# Patient Record
Sex: FEMALE | Race: OTHER | HISPANIC OR LATINO | URBAN - METROPOLITAN AREA
[De-identification: names, ages, dates, MRNs, and addresses within clinical notes are randomized per-mention and may not be internally consistent; named-entity substitution may affect disease eponyms.]

---

## 2017-04-26 ENCOUNTER — OUTPATIENT (OUTPATIENT)
Dept: OUTPATIENT SERVICES | Facility: HOSPITAL | Age: 72
LOS: 1 days | Discharge: HOME | End: 2017-04-26

## 2017-06-07 ENCOUNTER — APPOINTMENT (OUTPATIENT)
Dept: OTOLARYNGOLOGY | Facility: CLINIC | Age: 72
End: 2017-06-07

## 2017-06-07 PROBLEM — Z00.00 ENCOUNTER FOR PREVENTIVE HEALTH EXAMINATION: Status: ACTIVE | Noted: 2017-06-07

## 2017-06-14 ENCOUNTER — APPOINTMENT (OUTPATIENT)
Dept: OTOLARYNGOLOGY | Facility: CLINIC | Age: 72
End: 2017-06-14

## 2017-06-14 DIAGNOSIS — R13.10 DYSPHAGIA, UNSPECIFIED: ICD-10-CM

## 2017-06-28 DIAGNOSIS — E04.9 NONTOXIC GOITER, UNSPECIFIED: ICD-10-CM

## 2017-07-24 ENCOUNTER — OUTPATIENT (OUTPATIENT)
Dept: PREADMISSION | Facility: HOSPITAL | Age: 72
LOS: 1 days | Discharge: HOME | End: 2017-07-24

## 2017-07-24 DIAGNOSIS — E04.1 NONTOXIC SINGLE THYROID NODULE: ICD-10-CM

## 2017-07-24 DIAGNOSIS — Z01.818 ENCOUNTER FOR OTHER PREPROCEDURAL EXAMINATION: ICD-10-CM

## 2017-07-24 DIAGNOSIS — E66.9 OBESITY, UNSPECIFIED: ICD-10-CM

## 2017-07-24 DIAGNOSIS — I10 ESSENTIAL (PRIMARY) HYPERTENSION: ICD-10-CM

## 2017-08-07 ENCOUNTER — OUTPATIENT (OUTPATIENT)
Dept: OUTPATIENT SERVICES | Facility: HOSPITAL | Age: 72
LOS: 1 days | Discharge: HOME | End: 2017-08-07

## 2017-08-07 ENCOUNTER — APPOINTMENT (OUTPATIENT)
Dept: OTOLARYNGOLOGY | Facility: HOSPITAL | Age: 72
End: 2017-08-07
Payer: MEDICARE

## 2017-08-07 PROCEDURE — 60240 REMOVAL OF THYROID: CPT

## 2017-08-16 ENCOUNTER — APPOINTMENT (OUTPATIENT)
Dept: OTOLARYNGOLOGY | Facility: CLINIC | Age: 72
End: 2017-08-16
Payer: MEDICARE

## 2017-08-16 VITALS — BODY MASS INDEX: 38.57 KG/M2 | HEIGHT: 66 IN | WEIGHT: 240 LBS

## 2017-08-16 PROCEDURE — 99024 POSTOP FOLLOW-UP VISIT: CPT

## 2017-08-17 DIAGNOSIS — E66.9 OBESITY, UNSPECIFIED: ICD-10-CM

## 2017-08-17 DIAGNOSIS — C73 MALIGNANT NEOPLASM OF THYROID GLAND: ICD-10-CM

## 2017-08-17 DIAGNOSIS — I10 ESSENTIAL (PRIMARY) HYPERTENSION: ICD-10-CM

## 2017-08-17 DIAGNOSIS — E78.00 PURE HYPERCHOLESTEROLEMIA, UNSPECIFIED: ICD-10-CM

## 2017-08-17 DIAGNOSIS — E04.1 NONTOXIC SINGLE THYROID NODULE: ICD-10-CM

## 2017-09-20 ENCOUNTER — APPOINTMENT (OUTPATIENT)
Dept: OTOLARYNGOLOGY | Facility: CLINIC | Age: 72
End: 2017-09-20
Payer: MEDICARE

## 2017-09-20 VITALS — WEIGHT: 240 LBS | HEIGHT: 66 IN | BODY MASS INDEX: 38.57 KG/M2

## 2017-09-20 DIAGNOSIS — H91.90 UNSPECIFIED HEARING LOSS, UNSPECIFIED EAR: ICD-10-CM

## 2017-09-20 DIAGNOSIS — H90.3 SENSORINEURAL HEARING LOSS, BILATERAL: ICD-10-CM

## 2017-09-20 PROCEDURE — 92550 TYMPANOMETRY & REFLEX THRESH: CPT

## 2017-09-20 PROCEDURE — 92557 COMPREHENSIVE HEARING TEST: CPT

## 2017-09-20 PROCEDURE — 99213 OFFICE O/P EST LOW 20 MIN: CPT | Mod: 25,24

## 2017-09-26 ENCOUNTER — OUTPATIENT (OUTPATIENT)
Dept: OUTPATIENT SERVICES | Facility: HOSPITAL | Age: 72
LOS: 1 days | Discharge: HOME | End: 2017-09-26

## 2017-09-26 DIAGNOSIS — H90.3 SENSORINEURAL HEARING LOSS, BILATERAL: ICD-10-CM

## 2017-10-03 ENCOUNTER — OUTPATIENT (OUTPATIENT)
Dept: OUTPATIENT SERVICES | Facility: HOSPITAL | Age: 72
LOS: 1 days | Discharge: HOME | End: 2017-10-03

## 2017-10-03 DIAGNOSIS — H90.3 SENSORINEURAL HEARING LOSS, BILATERAL: ICD-10-CM

## 2017-10-17 ENCOUNTER — OUTPATIENT (OUTPATIENT)
Dept: OUTPATIENT SERVICES | Facility: HOSPITAL | Age: 72
LOS: 1 days | Discharge: HOME | End: 2017-10-17

## 2017-10-17 DIAGNOSIS — H90.3 SENSORINEURAL HEARING LOSS, BILATERAL: ICD-10-CM

## 2019-02-19 ENCOUNTER — APPOINTMENT (OUTPATIENT)
Dept: BREAST CENTER | Facility: CLINIC | Age: 74
End: 2019-02-19
Payer: MEDICARE

## 2019-02-19 VITALS
DIASTOLIC BLOOD PRESSURE: 82 MMHG | SYSTOLIC BLOOD PRESSURE: 132 MMHG | TEMPERATURE: 98 F | WEIGHT: 288 LBS | BODY MASS INDEX: 46.28 KG/M2 | HEIGHT: 66 IN

## 2019-02-19 DIAGNOSIS — Z87.891 PERSONAL HISTORY OF NICOTINE DEPENDENCE: ICD-10-CM

## 2019-02-19 PROCEDURE — 99205 OFFICE O/P NEW HI 60 MIN: CPT

## 2019-02-19 RX ORDER — AZITHROMYCIN 250 MG/1
250 TABLET, FILM COATED ORAL
Qty: 6 | Refills: 0 | Status: DISCONTINUED | COMMUNITY
Start: 2017-03-27 | End: 2019-02-19

## 2019-02-19 RX ORDER — LOSARTAN POTASSIUM 50 MG/1
50 TABLET, FILM COATED ORAL
Qty: 30 | Refills: 0 | Status: DISCONTINUED | COMMUNITY
Start: 2017-03-13 | End: 2019-02-19

## 2019-02-19 RX ORDER — LOSARTAN POTASSIUM AND HYDROCHLOROTHIAZIDE 25; 100 MG/1; MG/1
100-25 TABLET ORAL
Refills: 0 | Status: DISCONTINUED | COMMUNITY
End: 2019-02-19

## 2019-02-19 RX ORDER — ALBUTEROL SULFATE 2.5 MG/.5ML
SOLUTION RESPIRATORY (INHALATION)
Refills: 0 | Status: DISCONTINUED | COMMUNITY
End: 2019-02-19

## 2019-02-19 RX ORDER — FUROSEMIDE 40 MG/1
40 TABLET ORAL
Qty: 60 | Refills: 0 | Status: DISCONTINUED | COMMUNITY
Start: 2017-03-13 | End: 2019-02-19

## 2019-02-19 RX ORDER — OXYCODONE AND ACETAMINOPHEN 5; 325 MG/1; MG/1
5-325 TABLET ORAL
Qty: 20 | Refills: 0 | Status: DISCONTINUED | COMMUNITY
Start: 2017-08-07 | End: 2019-02-19

## 2019-02-19 RX ORDER — FLUTICASONE PROPIONATE 44 UG/1
44 AEROSOL, METERED RESPIRATORY (INHALATION)
Qty: 11 | Refills: 0 | Status: DISCONTINUED | COMMUNITY
Start: 2017-04-03 | End: 2019-02-19

## 2019-02-19 RX ORDER — DICLOFENAC SODIUM 75 MG/1
75 TABLET, DELAYED RELEASE ORAL
Refills: 0 | Status: DISCONTINUED | COMMUNITY
End: 2019-02-19

## 2019-02-19 RX ORDER — BUDESONIDE AND FORMOTEROL FUMARATE DIHYDRATE 80; 4.5 UG/1; UG/1
80-4.5 AEROSOL RESPIRATORY (INHALATION)
Qty: 10 | Refills: 0 | Status: DISCONTINUED | COMMUNITY
Start: 2017-07-26 | End: 2019-02-19

## 2019-02-20 ENCOUNTER — FORM ENCOUNTER (OUTPATIENT)
Age: 74
End: 2019-02-20

## 2019-02-21 ENCOUNTER — OUTPATIENT (OUTPATIENT)
Dept: OUTPATIENT SERVICES | Facility: HOSPITAL | Age: 74
LOS: 1 days | Discharge: HOME | End: 2019-02-21

## 2019-02-22 ENCOUNTER — FORM ENCOUNTER (OUTPATIENT)
Age: 74
End: 2019-02-22

## 2019-02-23 ENCOUNTER — OUTPATIENT (OUTPATIENT)
Dept: OUTPATIENT SERVICES | Facility: HOSPITAL | Age: 74
LOS: 1 days | Discharge: HOME | End: 2019-02-23

## 2019-02-23 DIAGNOSIS — C50.412 MALIGNANT NEOPLASM OF UPPER-OUTER QUADRANT OF LEFT FEMALE BREAST: ICD-10-CM

## 2019-02-25 ENCOUNTER — FORM ENCOUNTER (OUTPATIENT)
Age: 74
End: 2019-02-25

## 2019-02-26 ENCOUNTER — OUTPATIENT (OUTPATIENT)
Dept: OUTPATIENT SERVICES | Facility: HOSPITAL | Age: 74
LOS: 1 days | Discharge: HOME | End: 2019-02-26

## 2019-02-26 ENCOUNTER — LABORATORY RESULT (OUTPATIENT)
Age: 74
End: 2019-02-26

## 2019-02-26 DIAGNOSIS — R92.8 OTHER ABNORMAL AND INCONCLUSIVE FINDINGS ON DIAGNOSTIC IMAGING OF BREAST: ICD-10-CM

## 2019-02-26 DIAGNOSIS — R89.7 ABNORMAL HISTOLOGICAL FINDINGS IN SPECIMENS FROM OTHER ORGANS, SYSTEMS AND TISSUES: ICD-10-CM

## 2019-02-26 NOTE — CONSULT LETTER
[Dear  ___] : Dear  [unfilled], [Consult Letter:] : I had the pleasure of evaluating your patient, [unfilled]. [Please see my note below.] : Please see my note below. [Consult Closing:] : Thank you very much for allowing me to participate in the care of this patient.  If you have any questions, please do not hesitate to contact me. [Sincerely,] : Sincerely, [FreeTextEntry2] : Odessa Lo M.D.\par [address]\[place] [address]  [FreeTextEntry3] : Bebeto Stewart M.D., F.A.C.S.

## 2019-02-26 NOTE — ASSESSMENT
[FreeTextEntry1] : Tameka is a 74-year-old female who presents with a new diagnosis of left breast cancer. The patient recently underwent 2 stereotactic biopsies of a 6 cm area of calcifications in the left breast. The anterior biopsy demonstrated moderately differentiated invasive ductal carcinoma which is ER/LA positive and HER-2/atif negative. The posterior biopsy was benign demonstrated fibrocystic changes. She has a benign clinical breast examination. There are no palpable masses, nipple discharge or inversion. I reviewed her x-rays.\par \par We had a lengthy discussion regarding her diagnosis and treatment. We will schedule her for a left breast ultrasound to evaluate the area of concern and the axillary lymph nodes. We will also schedule her for bilateral breast MRI preoperatively. If there were no other malignancies in the left breast, she is a good candidate for breast conserving therapy. We will schedule her for a left breast lumpectomy with RF localizers x2 (for bracketing), left sentinel lymph node mapping and biopsy, possible axillary node dissection. All risks and benefits were explained.\par \par I spent a total of 60 minutes of face to face time with this patient, greater than 50% of which was spent in counseling and/or coordination of care.  All of her questions were appropriately answered.\par

## 2019-02-26 NOTE — HISTORY OF PRESENT ILLNESS
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 \par

## 2019-02-26 NOTE — REASON FOR VISIT
[Consultation] : a consultation visit [Family Member] : family member [FreeTextEntry1] : Left breast cancer; last mammogram was at least 10 years ago; PMD is Dr. Odessa Lo.

## 2019-02-26 NOTE — PAST MEDICAL HISTORY
[Postmenopausal] : The patient is postmenopausal [Menarche Age ____] : age at menarche was [unfilled] [Menopause Age____] : age at menopause was [unfilled] [Total Preg ___] : G[unfilled] [Live Births ___] : P[unfilled]  [Age At Live Birth ___] : Age at live birth: [unfilled] [History of Hormone Replacement Treatment] : has no history of hormone replacement treatment [FreeTextEntry5] : Unilateral oophorectomy due to fibroids 1988. [FreeTextEntry6] : none [FreeTextEntry7] : OCP for 0-5 years; discontinued in 1988. [FreeTextEntry8] : none

## 2019-02-26 NOTE — DATA REVIEWED
[FreeTextEntry1] : #[id number] MAMMO TOMOSYNTHESIS SCREENING BILATERAL: 12/13/2018\par CLINICAL INDICATION: Patient does report CBE in the past year.\par No prior exams were available for comparison.\par Tomosynthesis and 2D imaging of the breast(s) were performed.\par There are scattered fibroglandular elements in both breasts.\par There are segmental calcifications in the left breast at 12 o'clock middle depth.\par No other significant masses, calcifications, or other findings are seen in either breast.\par Current study was also evaluated with a computer aided detection (CAD) system.\par IMPRESSION: INCOMPLETE: NEEDS ADDITIONAL IMAGING EVALUATION\par The segmental calcifications in the left breast are indeterminate. Magnification and lateral views are recommended.\par The patient has been or will be contacted.\par Electronic Signature: I personally reviewed the images and agree with this report. Final Report: Signed by Attending Rubi Vides MD\par se/penrad:12/18/2018 16:47:28\par letter sent: Additional Imaging Needed\par Mammogram BI-RADS: 0 Incomplete: Needs Additional Imaging Evaluation\par \par \par \par #[id number] MAMMO TOMOSYNTHESIS CALLBACK FROM SCREENING LEFT: 1/9/2019\par CLINICAL INDICATION: Patient does report CBE in the past year. Patient is recalled from screening examination for further evaluation of calcifications in the right breast.\par No prior exams were available for comparison.\par Tomosynthesis and 2D imaging of the breast(s) were performed.\par There are scattered fibroglandular elements in the left breast.\par There is a 6 cm area of segmental amorphous calcifications in the left breast at 12 o'clock middle depth. These are seen in additional views.\par No other significant masses or calcifications are seen in the breast.\par Current study was also evaluated with a computer aided detection (CAD) system.\par IMPRESSION: SUSPICIOUS OF MALIGNANCY\par Findings and recommendations were discussed with the patient at the time of the exam.\par The 6 cm area of segmental amorphous calcifications in the left breast are suspicious of malignancy. A stereotactic biopsy of the most anterior and of the most posterior calcifications withing this group is recommended.\par The patient will be sent a letter.\par Electronic Signature: I personally reviewed the images and agree with this report. Final Report: Signed by Poonam Fernandes MD\par rb/:1/9/2019 12:41:06\par letter sent: Biopsy Required\par Mammogram BI-RADS: 4 Suspicious abnormality\par \par \par \par AMENDMENT: 2/1/2019 Rubi Vides MD\par DIAGNOSIS:\par 1. BREAST, LEFT 12 O'CLOCK "ANTERIOR, W/ CALCS", CORE BIOPSY:\par - INVASIVE DUCTAL CARCINOMA, MODERATELY DIFFERENTIATED\par - SCLEROSING INTRADUCTAL PAPILLOMA\par - COLUMNAR CELL CHANGE AND COLUMNAR CELL HYPERPLASIA, ASSOCIATED\par WITH MICROCALCIFICATIONS\par 2. BREAST, LEFT 12 O'CLOCK "ANTERIOR, TISSUE", CORE BIOPSY:\par - INVASIVE DUCTAL CARCINOMA, MODERATELY DIFFERENTIATED\par 3. BREAST, LEFT 12 O'CLOCK "POSTERIOR, W/ CALCS", CORE BIOPSY:\par - FIBROCYSTIC CHANGE, NON-PROLIFERATIVE TYPE,\par - FIBROADENOMATOID CHANGE\par 4. BREAST, LEFT 12 O'CLOCK "POSTERIOR, TISSUE", CORE BIOPSY:\par - FIBROADENOMATOID CHANGE, ASSOCIATED WITH MICROCALCIFICATIONS\par This is concordant with imaging findings. Surgical consultation and excision with bracketing of the 2 clips is recommended.\par Findings and recommendations were discussed with the patient by the dictating physician on 2/1/2019 at 3:20 PM\par *******END OF ADDENDUM******\par #[id number] MAMMO STEREO CORE BREAST BIOPSY LEFT: 1/22/2019\par \par

## 2019-02-28 DIAGNOSIS — Z02.9 ENCOUNTER FOR ADMINISTRATIVE EXAMINATIONS, UNSPECIFIED: ICD-10-CM

## 2019-02-28 DIAGNOSIS — R89.7 ABNORMAL HISTOLOGICAL FINDINGS IN SPECIMENS FROM OTHER ORGANS, SYSTEMS AND TISSUES: ICD-10-CM

## 2019-03-06 ENCOUNTER — FORM ENCOUNTER (OUTPATIENT)
Age: 74
End: 2019-03-06

## 2019-03-07 ENCOUNTER — RESULT REVIEW (OUTPATIENT)
Age: 74
End: 2019-03-07

## 2019-03-07 ENCOUNTER — OUTPATIENT (OUTPATIENT)
Dept: OUTPATIENT SERVICES | Facility: HOSPITAL | Age: 74
LOS: 1 days | Discharge: HOME | End: 2019-03-07

## 2019-03-07 DIAGNOSIS — R92.8 OTHER ABNORMAL AND INCONCLUSIVE FINDINGS ON DIAGNOSTIC IMAGING OF BREAST: ICD-10-CM

## 2019-03-08 LAB — SURGICAL PATHOLOGY STUDY: SIGNIFICANT CHANGE UP

## 2019-03-14 ENCOUNTER — APPOINTMENT (OUTPATIENT)
Dept: BREAST CENTER | Facility: CLINIC | Age: 74
End: 2019-03-14
Payer: MEDICARE

## 2019-03-14 PROCEDURE — 99214 OFFICE O/P EST MOD 30 MIN: CPT

## 2019-03-14 NOTE — DATA REVIEWED
[FreeTextEntry1] : EXAM:  MR BREAST WAW IC BI         PROCEDURE DATE:  02/23/2019       INTERPRETATION:  CLINICAL HISTORY: New diagnosis of invasive duct  carcinoma in the left breast at an outside facility.  TECHNIQUE: Breast MRI is performed at 1.5 T with the patient prone and  the breasts in a dedicated breast coil. Following a 3 plane localizer,  sagittal T1 weighted, fat-saturated T1 weighted and fat saturated  T2-weighted sequence; dynamic contrast enhanced sagittal images; and  delayed post-contrast axial fat-saturated T1 weighted images were  obtained. 12.5 mL of Gadavist intravenous contrast were injected and 2.5  mL were discarded. Subtraction and MIP images were reviewed.  Delphi  software was used in interpretation.   COMPARISON: Outside mammogram of 12/13/2018, 1/9/2019, and 1/22/2019.  FINDINGS:   Amount of fibroglandular tissue: Scattered fibroglandular tissue.  Background parenchymal enhancement: Moderate., Symmetric.  RIGHT BREAST: Slightly irregular, linear nonmass enhancement in the central aspect of  the right breast measures approximately 0.7 cm. This is seen on series  700, image 174 and series 8, image 46. This demonstrates persistent  enhancement kinetics. MRI guided biopsy is recommended. No additional  suspicious abnormal enhancement is seen in the right breast.  No right axillary adenopathy.  LEFT BREAST: 2 biopsy clips are seen in the superior aspect of the left breast. Mild  patchy nonmass enhancement is seen surrounding the biopsy clips; linear  nonmass enhancement is seen along the anterior and lateral aspect of the  superior biopsy clip measuring approximately 1.8 cm (for example, series  700, image 126). These correspond to the calcifications seen on the  mammogram and to the biopsy-proven carcinoma.  Benign cyst is seen in the lateral aspect of the left breast  corresponding to the circumscribed mass on mammogram.  No additional suspicious abnormal enhancement is seen in the left breast.  Mildly prominent left axillary lymph nodes (series 8, image 95 and series  8, image 85), nonspecific.   IMPRESSION:  1. Nonmass enhancement in the right breast as above. MRI guided biopsy is  recommended. 2. Mildly prominent left axillary lymph nodes, nonspecific. Second Look  ultrasound with possible biopsy is recommended. 3. Biopsy-proven carcinoma in the left breast as above. If breast  conservation is desired, bracketing of the anterior and posterior most  calcifications is recommended.  Recommendation: MRI guided biopsy.  BI-RADS Category 4: Suspicious   Findings and recommendations were discussed with Myra, who will inform  the patient and Dr. Stewart.        HAROON WALL M.D., ATTENDING RADIOLOGIST This document has been electronically signed. Feb 25 2019  4:48PM       \par \par \par \par \par \par EXAM:  US BREAST LIMITED BI         PROCEDURE DATE:  02/26/2019      INTERPRETATION:  CLINICAL HISTORY: Known left breast cancer. Second look  ultrasound for possible left axillary adenopathy.   The patient reports her last clinical breast examination was performed  within the past month.  COMPARISON: MRI done on 2/23/2019.  FAMILY HISTORY: Unknown.   TECHNIQUE: Targeted bilateral ultrasound was performed.   FINDINGS:  ULTRASOUND: Targeted bilateral breast ultrasound was performed.  There is no axillary adenopathy. Specifically, the left axillary lymph  nodes are morphologically benign in appearance.   IMPRESSION:   No axillary adenopathy.   Recommendation: Management as clinically appropriate.  BI-RADS Category 6: Known Biopsy-Proven Malignancy   Findings and recommendations were discussed with the patient.        HAROON WALL M.D., ATTENDING RADIOLOGIST This document has been electronically signed. Feb 26 2019  9:12AM       \par \par \par \par \par EXAM:  MR BX BRST 1ST RT SISC      \par *** ADDENDUM 03/11/2019  ***\par Histology: - benign breast tissue with proliferative type fibrocystic \par changes including dominant nodular foci of cystic/papillary\par apocrine metaplasia, usual type duct hyperplasia, stromal fibrosis, \par sclerosing and blunt duct adenosis, columnar cell change/hyperplasia \par without atypia, and microcalcifications.\par Findings are benign concordant.\par Recommendation: Continued surgical and oncologic follow-up for the \par biopsy-proven left breast malignancy is recommended.\par The findings and recommendation were discussed with the patient on \par 3/11/2019 at 9:30 AM.\par *** END OF ADDENDUM 03/11/2019  ***\par PROCEDURE DATE:  03/07/2019

## 2019-03-14 NOTE — HISTORY OF PRESENT ILLNESS
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 \par \par Right benign breast tissue with proliferative type fibrocystic changes on MR guided core biopsy 3/7/19; Central, 7 mm (cork).  \par

## 2019-03-14 NOTE — REASON FOR VISIT
[Consultation] : a consultation visit [Family Member] : family member [FreeTextEntry1] : Re-consultation for Left breast cancer.

## 2019-03-14 NOTE — PAST MEDICAL HISTORY
[History of Hormone Replacement Treatment] : has no history of hormone replacement treatment [FreeTextEntry5] : Unilateral oophorectomy due to fibroids 1988. [FreeTextEntry6] : none [FreeTextEntry7] : OCP for 0-5 years; discontinued in 1988. [FreeTextEntry8] : none

## 2019-03-14 NOTE — ASSESSMENT
[FreeTextEntry1] : Tameka has a 6 cm area of calcifications which demonstrated invasive ductal carcinoma and ductal carcinoma in situ which is ER/NM positive and HER-2 negative. She had a preoperative bilateral breast MRI which did not demonstrate additional malignancies.  She prefers breast conserving therapy.  We will schedule her for a left breast lumpectomy with needle localization x2 (for bracketing), left sentinel lymph node biopsy, possible axillary node dissection.  All risks, including the possibility of re-excision, and benefits were explained.\par \par I spent a total of 30 minutes of face to face time with this patient, greater than 50% of which was spent in counseling and/or coordination of care.  All of her questions were appropriately answered.\par \par

## 2019-03-19 ENCOUNTER — FORM ENCOUNTER (OUTPATIENT)
Age: 74
End: 2019-03-19

## 2019-03-20 ENCOUNTER — OUTPATIENT (OUTPATIENT)
Dept: OUTPATIENT SERVICES | Facility: HOSPITAL | Age: 74
LOS: 1 days | Discharge: HOME | End: 2019-03-20

## 2019-03-20 VITALS
WEIGHT: 292.77 LBS | OXYGEN SATURATION: 97 % | DIASTOLIC BLOOD PRESSURE: 89 MMHG | SYSTOLIC BLOOD PRESSURE: 157 MMHG | HEIGHT: 66 IN | RESPIRATION RATE: 18 BRPM | TEMPERATURE: 96 F | HEART RATE: 64 BPM

## 2019-03-20 DIAGNOSIS — Z01.818 ENCOUNTER FOR OTHER PREPROCEDURAL EXAMINATION: ICD-10-CM

## 2019-03-20 DIAGNOSIS — C50.512 MALIGNANT NEOPLASM OF LOWER-OUTER QUADRANT OF LEFT FEMALE BREAST: ICD-10-CM

## 2019-03-20 DIAGNOSIS — C50.412 MALIGNANT NEOPLASM OF UPPER-OUTER QUADRANT OF LEFT FEMALE BREAST: ICD-10-CM

## 2019-03-20 DIAGNOSIS — Z98.890 OTHER SPECIFIED POSTPROCEDURAL STATES: Chronic | ICD-10-CM

## 2019-03-20 LAB
ALBUMIN SERPL ELPH-MCNC: 4.3 G/DL — SIGNIFICANT CHANGE UP (ref 3.5–5.2)
ALP SERPL-CCNC: 93 U/L — SIGNIFICANT CHANGE UP (ref 30–115)
ALT FLD-CCNC: 16 U/L — SIGNIFICANT CHANGE UP (ref 0–41)
ANION GAP SERPL CALC-SCNC: 10 MMOL/L — SIGNIFICANT CHANGE UP (ref 7–14)
APTT BLD: 36.3 SEC — SIGNIFICANT CHANGE UP (ref 27–39.2)
AST SERPL-CCNC: 18 U/L — SIGNIFICANT CHANGE UP (ref 0–41)
BASOPHILS # BLD AUTO: 0.07 K/UL — SIGNIFICANT CHANGE UP (ref 0–0.2)
BASOPHILS NFR BLD AUTO: 1.1 % — HIGH (ref 0–1)
BILIRUB SERPL-MCNC: 0.6 MG/DL — SIGNIFICANT CHANGE UP (ref 0.2–1.2)
BUN SERPL-MCNC: 11 MG/DL — SIGNIFICANT CHANGE UP (ref 10–20)
CALCIUM SERPL-MCNC: 9.2 MG/DL — SIGNIFICANT CHANGE UP (ref 8.5–10.1)
CHLORIDE SERPL-SCNC: 103 MMOL/L — SIGNIFICANT CHANGE UP (ref 98–110)
CO2 SERPL-SCNC: 29 MMOL/L — SIGNIFICANT CHANGE UP (ref 17–32)
CREAT SERPL-MCNC: 0.7 MG/DL — SIGNIFICANT CHANGE UP (ref 0.7–1.5)
EOSINOPHIL # BLD AUTO: 0.06 K/UL — SIGNIFICANT CHANGE UP (ref 0–0.7)
EOSINOPHIL NFR BLD AUTO: 0.9 % — SIGNIFICANT CHANGE UP (ref 0–8)
GLUCOSE SERPL-MCNC: 83 MG/DL — SIGNIFICANT CHANGE UP (ref 70–99)
HCT VFR BLD CALC: 39.7 % — SIGNIFICANT CHANGE UP (ref 37–47)
HGB BLD-MCNC: 12.7 G/DL — SIGNIFICANT CHANGE UP (ref 12–16)
IMM GRANULOCYTES NFR BLD AUTO: 0.2 % — SIGNIFICANT CHANGE UP (ref 0.1–0.3)
INR BLD: 0.97 RATIO — SIGNIFICANT CHANGE UP (ref 0.65–1.3)
LYMPHOCYTES # BLD AUTO: 1.13 K/UL — LOW (ref 1.2–3.4)
LYMPHOCYTES # BLD AUTO: 17.9 % — LOW (ref 20.5–51.1)
MCHC RBC-ENTMCNC: 30 PG — SIGNIFICANT CHANGE UP (ref 27–31)
MCHC RBC-ENTMCNC: 32 G/DL — SIGNIFICANT CHANGE UP (ref 32–37)
MCV RBC AUTO: 93.6 FL — SIGNIFICANT CHANGE UP (ref 81–99)
MONOCYTES # BLD AUTO: 0.44 K/UL — SIGNIFICANT CHANGE UP (ref 0.1–0.6)
MONOCYTES NFR BLD AUTO: 7 % — SIGNIFICANT CHANGE UP (ref 1.7–9.3)
NEUTROPHILS # BLD AUTO: 4.61 K/UL — SIGNIFICANT CHANGE UP (ref 1.4–6.5)
NEUTROPHILS NFR BLD AUTO: 72.9 % — SIGNIFICANT CHANGE UP (ref 42.2–75.2)
NRBC # BLD: 0 /100 WBCS — SIGNIFICANT CHANGE UP (ref 0–0)
PLATELET # BLD AUTO: 161 K/UL — SIGNIFICANT CHANGE UP (ref 130–400)
POTASSIUM SERPL-MCNC: 4.3 MMOL/L — SIGNIFICANT CHANGE UP (ref 3.5–5)
POTASSIUM SERPL-SCNC: 4.3 MMOL/L — SIGNIFICANT CHANGE UP (ref 3.5–5)
PROT SERPL-MCNC: 6.9 G/DL — SIGNIFICANT CHANGE UP (ref 6–8)
PROTHROM AB SERPL-ACNC: 11.2 SEC — SIGNIFICANT CHANGE UP (ref 9.95–12.87)
RBC # BLD: 4.24 M/UL — SIGNIFICANT CHANGE UP (ref 4.2–5.4)
RBC # FLD: 12 % — SIGNIFICANT CHANGE UP (ref 11.5–14.5)
SODIUM SERPL-SCNC: 142 MMOL/L — SIGNIFICANT CHANGE UP (ref 135–146)
WBC # BLD: 6.32 K/UL — SIGNIFICANT CHANGE UP (ref 4.8–10.8)
WBC # FLD AUTO: 6.32 K/UL — SIGNIFICANT CHANGE UP (ref 4.8–10.8)

## 2019-03-20 NOTE — H&P PST ADULT - HISTORY OF PRESENT ILLNESS
CURRENTLY  DENIES ANY CP, SOB,PALPITATIONS,COUGH OR DYSURIA  EXERCISE TOLERANCE 1-2 FOS WITHOUT SOB    AS PER PATIENT  this is his/her complete medical history including medications - PRESCRIPTIONS  OVER THE COUNTER MEDS CURRENTLY  DENIES ANY CP, SOB,PALPITATIONS,COUGH OR DYSURIA  EXERCISE TOLERANCE 1 FOS WITHOUT SOB    AS PER PATIENT  this is his/her complete medical history including medications - PRESCRIPTIONS  OVER THE COUNTER MEDS

## 2019-03-20 NOTE — H&P PST ADULT - NSICDXPASTMEDICALHX_GEN_ALL_CORE_FT
PAST MEDICAL HISTORY:  Asthma NEVER HAD ATTACK- ALLERGY INDUCED    Breast cancer LEFT    HTN (hypertension)     OA (osteoarthritis)     PVD (peripheral vascular disease)

## 2019-03-20 NOTE — H&P PST ADULT - PRIMARY CARE PROVIDER
michellear- lv 3/2019   farid- lv 3/2019 appt 3/25/19  pulmonary- ahmed lv 3/19   endorine- miladysamia lv 3/2019 shektar- lv 3/2019   farid- lv 3/2019 appt 3/25/19  pulmonary- ahmed lv 3/19   endocrine- calamia lv 3/2019

## 2019-03-20 NOTE — H&P PST ADULT - REASON FOR ADMISSION
73 Y/O F SCHEDULED FOR PAST FOR LEFT BREAST BRACKETED LUMPECTOMY WITH NEEDLE LUMPECTOMY , SENTINEL NODE MAPPING AND BIOPSY, POSSIBLE AXILLARY NODE DISSECTION 4/3/19

## 2019-03-27 ENCOUNTER — OUTPATIENT (OUTPATIENT)
Dept: OUTPATIENT SERVICES | Facility: HOSPITAL | Age: 74
LOS: 1 days | Discharge: HOME | End: 2019-03-27

## 2019-03-27 ENCOUNTER — LABORATORY RESULT (OUTPATIENT)
Age: 74
End: 2019-03-27

## 2019-03-27 DIAGNOSIS — R89.7 ABNORMAL HISTOLOGICAL FINDINGS IN SPECIMENS FROM OTHER ORGANS, SYSTEMS AND TISSUES: ICD-10-CM

## 2019-03-27 DIAGNOSIS — Z98.890 OTHER SPECIFIED POSTPROCEDURAL STATES: Chronic | ICD-10-CM

## 2019-03-27 PROBLEM — M19.90 UNSPECIFIED OSTEOARTHRITIS, UNSPECIFIED SITE: Chronic | Status: ACTIVE | Noted: 2019-03-20

## 2019-03-27 PROBLEM — C50.919 MALIGNANT NEOPLASM OF UNSPECIFIED SITE OF UNSPECIFIED FEMALE BREAST: Chronic | Status: ACTIVE | Noted: 2019-03-20

## 2019-03-27 PROBLEM — I10 ESSENTIAL (PRIMARY) HYPERTENSION: Chronic | Status: ACTIVE | Noted: 2019-03-20

## 2019-03-27 PROBLEM — I73.9 PERIPHERAL VASCULAR DISEASE, UNSPECIFIED: Chronic | Status: ACTIVE | Noted: 2019-03-20

## 2019-04-01 ENCOUNTER — FORM ENCOUNTER (OUTPATIENT)
Age: 74
End: 2019-04-01

## 2019-04-02 ENCOUNTER — OUTPATIENT (OUTPATIENT)
Dept: OUTPATIENT SERVICES | Facility: HOSPITAL | Age: 74
LOS: 1 days | Discharge: HOME | End: 2019-04-02
Payer: MEDICARE

## 2019-04-02 ENCOUNTER — FORM ENCOUNTER (OUTPATIENT)
Age: 74
End: 2019-04-02

## 2019-04-02 DIAGNOSIS — Z98.890 OTHER SPECIFIED POSTPROCEDURAL STATES: Chronic | ICD-10-CM

## 2019-04-02 DIAGNOSIS — C81.09 NODULAR LYMPHOCYTE PREDOMINANT HODGKIN LYMPHOMA, EXTRANODAL AND SOLID ORGAN SITES: ICD-10-CM

## 2019-04-02 PROCEDURE — 78195 LYMPH SYSTEM IMAGING: CPT | Mod: 26

## 2019-04-02 NOTE — ASU PATIENT PROFILE, ADULT - PMH
Asthma  NEVER HAD ATTACK- ALLERGY INDUCED  Breast cancer  LEFT  HTN (hypertension)    OA (osteoarthritis)    PVD (peripheral vascular disease)

## 2019-04-03 ENCOUNTER — RESULT REVIEW (OUTPATIENT)
Age: 74
End: 2019-04-03

## 2019-04-03 ENCOUNTER — APPOINTMENT (OUTPATIENT)
Dept: BREAST CENTER | Facility: AMBULATORY SURGERY CENTER | Age: 74
End: 2019-04-03
Payer: MEDICARE

## 2019-04-03 ENCOUNTER — INPATIENT (INPATIENT)
Facility: HOSPITAL | Age: 74
LOS: 1 days | Discharge: HOME | End: 2019-04-05
Attending: SPECIALIST | Admitting: SPECIALIST
Payer: MEDICARE

## 2019-04-03 VITALS
HEART RATE: 71 BPM | HEIGHT: 66 IN | TEMPERATURE: 98 F | OXYGEN SATURATION: 97 % | RESPIRATION RATE: 20 BRPM | SYSTOLIC BLOOD PRESSURE: 153 MMHG | DIASTOLIC BLOOD PRESSURE: 87 MMHG | WEIGHT: 292.77 LBS

## 2019-04-03 DIAGNOSIS — Z98.890 OTHER SPECIFIED POSTPROCEDURAL STATES: Chronic | ICD-10-CM

## 2019-04-03 LAB
ANION GAP SERPL CALC-SCNC: 18 MMOL/L — HIGH (ref 7–14)
BASE EXCESS BLDA CALC-SCNC: 2.6 MMOL/L — HIGH (ref -2–2)
BUN SERPL-MCNC: 14 MG/DL — SIGNIFICANT CHANGE UP (ref 10–20)
CALCIUM SERPL-MCNC: 8.7 MG/DL — SIGNIFICANT CHANGE UP (ref 8.5–10.1)
CHLORIDE SERPL-SCNC: 102 MMOL/L — SIGNIFICANT CHANGE UP (ref 98–110)
CK MB CFR SERPL CALC: 3.5 NG/ML — SIGNIFICANT CHANGE UP (ref 0.6–6.3)
CK SERPL-CCNC: 196 U/L — SIGNIFICANT CHANGE UP (ref 0–225)
CO2 SERPL-SCNC: 21 MMOL/L — SIGNIFICANT CHANGE UP (ref 17–32)
CREAT SERPL-MCNC: 0.7 MG/DL — SIGNIFICANT CHANGE UP (ref 0.7–1.5)
GLUCOSE BLDC GLUCOMTR-MCNC: 126 MG/DL — HIGH (ref 70–99)
GLUCOSE SERPL-MCNC: 161 MG/DL — HIGH (ref 70–99)
HCO3 BLDA-SCNC: 29 MMOL/L — HIGH (ref 23–27)
HCT VFR BLD CALC: 42.2 % — SIGNIFICANT CHANGE UP (ref 37–47)
HGB BLD-MCNC: 13.5 G/DL — SIGNIFICANT CHANGE UP (ref 12–16)
HOROWITZ INDEX BLDA+IHG-RTO: 32 — SIGNIFICANT CHANGE UP
MAGNESIUM SERPL-MCNC: 1.7 MG/DL — LOW (ref 1.8–2.4)
MCHC RBC-ENTMCNC: 29.9 PG — SIGNIFICANT CHANGE UP (ref 27–31)
MCHC RBC-ENTMCNC: 32 G/DL — SIGNIFICANT CHANGE UP (ref 32–37)
MCV RBC AUTO: 93.6 FL — SIGNIFICANT CHANGE UP (ref 81–99)
NRBC # BLD: 0 /100 WBCS — SIGNIFICANT CHANGE UP (ref 0–0)
PCO2 BLDA: 49 MMHG — HIGH (ref 38–42)
PH BLDA: 7.38 — SIGNIFICANT CHANGE UP (ref 7.38–7.42)
PHOSPHATE SERPL-MCNC: 3.2 MG/DL — SIGNIFICANT CHANGE UP (ref 2.1–4.9)
PLATELET # BLD AUTO: 196 K/UL — SIGNIFICANT CHANGE UP (ref 130–400)
PO2 BLDA: 88 MMHG — SIGNIFICANT CHANGE UP (ref 78–95)
POTASSIUM SERPL-MCNC: 4.4 MMOL/L — SIGNIFICANT CHANGE UP (ref 3.5–5)
POTASSIUM SERPL-SCNC: 4.4 MMOL/L — SIGNIFICANT CHANGE UP (ref 3.5–5)
RBC # BLD: 4.51 M/UL — SIGNIFICANT CHANGE UP (ref 4.2–5.4)
RBC # FLD: 12.2 % — SIGNIFICANT CHANGE UP (ref 11.5–14.5)
SAO2 % BLDA: 97 % — SIGNIFICANT CHANGE UP (ref 94–98)
SODIUM SERPL-SCNC: 141 MMOL/L — SIGNIFICANT CHANGE UP (ref 135–146)
TROPONIN T SERPL-MCNC: <0.01 NG/ML — SIGNIFICANT CHANGE UP
WBC # BLD: 12.87 K/UL — HIGH (ref 4.8–10.8)
WBC # FLD AUTO: 12.87 K/UL — HIGH (ref 4.8–10.8)

## 2019-04-03 PROCEDURE — 38525 BIOPSY/REMOVAL LYMPH NODES: CPT | Mod: RT

## 2019-04-03 PROCEDURE — 19281 PERQ DEVICE BREAST 1ST IMAG: CPT | Mod: LT

## 2019-04-03 PROCEDURE — 88307 TISSUE EXAM BY PATHOLOGIST: CPT | Mod: 26

## 2019-04-03 PROCEDURE — 19301 PARTIAL MASTECTOMY: CPT | Mod: RT

## 2019-04-03 PROCEDURE — 19282 PERQ DEVICE BREAST EA IMAG: CPT | Mod: LT

## 2019-04-03 PROCEDURE — 88305 TISSUE EXAM BY PATHOLOGIST: CPT | Mod: 26

## 2019-04-03 PROCEDURE — 38900 IO MAP OF SENT LYMPH NODE: CPT | Mod: RT

## 2019-04-03 PROCEDURE — 88341 IMHCHEM/IMCYTCHM EA ADD ANTB: CPT | Mod: 26

## 2019-04-03 PROCEDURE — 88342 IMHCHEM/IMCYTCHM 1ST ANTB: CPT | Mod: 26

## 2019-04-03 RX ORDER — LEVOTHYROXINE SODIUM 125 MCG
150 TABLET ORAL DAILY
Qty: 0 | Refills: 0 | Status: DISCONTINUED | OUTPATIENT
Start: 2019-04-04 | End: 2019-04-04

## 2019-04-03 RX ORDER — ATORVASTATIN CALCIUM 80 MG/1
40 TABLET, FILM COATED ORAL AT BEDTIME
Qty: 0 | Refills: 0 | Status: DISCONTINUED | OUTPATIENT
Start: 2019-04-04 | End: 2019-04-05

## 2019-04-03 RX ORDER — ASPIRIN/CALCIUM CARB/MAGNESIUM 324 MG
81 TABLET ORAL DAILY
Qty: 0 | Refills: 0 | Status: DISCONTINUED | OUTPATIENT
Start: 2019-04-04 | End: 2019-04-05

## 2019-04-03 RX ORDER — OXYCODONE AND ACETAMINOPHEN 5; 325 MG/1; MG/1
1 TABLET ORAL ONCE
Qty: 0 | Refills: 0 | Status: DISCONTINUED | OUTPATIENT
Start: 2019-04-03 | End: 2019-04-03

## 2019-04-03 RX ORDER — BUDESONIDE AND FORMOTEROL FUMARATE DIHYDRATE 160; 4.5 UG/1; UG/1
2 AEROSOL RESPIRATORY (INHALATION)
Qty: 0 | Refills: 0 | Status: DISCONTINUED | OUTPATIENT
Start: 2019-04-03 | End: 2019-04-05

## 2019-04-03 RX ORDER — DEXAMETHASONE 0.5 MG/5ML
8 ELIXIR ORAL EVERY 6 HOURS
Qty: 0 | Refills: 0 | Status: DISCONTINUED | OUTPATIENT
Start: 2019-04-03 | End: 2019-04-04

## 2019-04-03 RX ORDER — HYDROCORTISONE 20 MG
100 TABLET ORAL ONCE
Qty: 0 | Refills: 0 | Status: COMPLETED | OUTPATIENT
Start: 2019-04-03 | End: 2019-04-03

## 2019-04-03 RX ORDER — EPINEPHRINE 0.3 MG/.3ML
0.3 INJECTION INTRAMUSCULAR; SUBCUTANEOUS ONCE
Qty: 0 | Refills: 0 | Status: COMPLETED | OUTPATIENT
Start: 2019-04-03 | End: 2019-04-03

## 2019-04-03 RX ORDER — ALBUTEROL 90 UG/1
2.5 AEROSOL, METERED ORAL ONCE
Qty: 0 | Refills: 0 | Status: COMPLETED | OUTPATIENT
Start: 2019-04-03 | End: 2019-04-03

## 2019-04-03 RX ORDER — FAMOTIDINE 10 MG/ML
10 INJECTION INTRAVENOUS ONCE
Qty: 0 | Refills: 0 | Status: COMPLETED | OUTPATIENT
Start: 2019-04-03 | End: 2019-04-03

## 2019-04-03 RX ORDER — LOSARTAN POTASSIUM 100 MG/1
50 TABLET, FILM COATED ORAL
Qty: 0 | Refills: 0 | Status: DISCONTINUED | OUTPATIENT
Start: 2019-04-04 | End: 2019-04-05

## 2019-04-03 RX ORDER — DEXAMETHASONE 0.5 MG/5ML
8 ELIXIR ORAL EVERY 6 HOURS
Qty: 0 | Refills: 0 | Status: DISCONTINUED | OUTPATIENT
Start: 2019-04-03 | End: 2019-04-03

## 2019-04-03 RX ORDER — SODIUM CHLORIDE 9 MG/ML
1000 INJECTION, SOLUTION INTRAVENOUS
Qty: 0 | Refills: 0 | Status: DISCONTINUED | OUTPATIENT
Start: 2019-04-03 | End: 2019-04-03

## 2019-04-03 RX ORDER — DIPHENHYDRAMINE HCL 50 MG
50 CAPSULE ORAL ONCE
Qty: 0 | Refills: 0 | Status: COMPLETED | OUTPATIENT
Start: 2019-04-03 | End: 2019-04-03

## 2019-04-03 RX ORDER — FAMOTIDINE 10 MG/ML
20 INJECTION INTRAVENOUS ONCE
Qty: 0 | Refills: 0 | Status: COMPLETED | OUTPATIENT
Start: 2019-04-03 | End: 2019-04-03

## 2019-04-03 RX ORDER — EPINEPHRINE 11.25MG/ML
0.5 SOLUTION, NON-ORAL INHALATION
Qty: 0 | Refills: 0 | Status: DISCONTINUED | OUTPATIENT
Start: 2019-04-03 | End: 2019-04-05

## 2019-04-03 RX ORDER — ZOLPIDEM TARTRATE 10 MG/1
5 TABLET ORAL
Qty: 0 | Refills: 0 | Status: DISCONTINUED | OUTPATIENT
Start: 2019-04-04 | End: 2019-04-05

## 2019-04-03 RX ORDER — FAMOTIDINE 10 MG/ML
20 INJECTION INTRAVENOUS DAILY
Qty: 0 | Refills: 0 | Status: DISCONTINUED | OUTPATIENT
Start: 2019-04-03 | End: 2019-04-03

## 2019-04-03 RX ORDER — ONDANSETRON 8 MG/1
4 TABLET, FILM COATED ORAL ONCE
Qty: 0 | Refills: 0 | Status: DISCONTINUED | OUTPATIENT
Start: 2019-04-03 | End: 2019-04-05

## 2019-04-03 RX ORDER — DEXAMETHASONE 0.5 MG/5ML
10 ELIXIR ORAL ONCE
Qty: 0 | Refills: 0 | Status: COMPLETED | OUTPATIENT
Start: 2019-04-03 | End: 2019-04-03

## 2019-04-03 RX ORDER — ESCITALOPRAM OXALATE 10 MG/1
10 TABLET, FILM COATED ORAL
Qty: 0 | Refills: 0 | Status: DISCONTINUED | OUTPATIENT
Start: 2019-04-04 | End: 2019-04-05

## 2019-04-03 RX ORDER — MONTELUKAST 4 MG/1
10 TABLET, CHEWABLE ORAL
Qty: 0 | Refills: 0 | Status: DISCONTINUED | OUTPATIENT
Start: 2019-04-04 | End: 2019-04-05

## 2019-04-03 RX ORDER — HYDROCHLOROTHIAZIDE 25 MG
12.5 TABLET ORAL
Qty: 0 | Refills: 0 | Status: DISCONTINUED | OUTPATIENT
Start: 2019-04-04 | End: 2019-04-05

## 2019-04-03 RX ORDER — HYDROMORPHONE HYDROCHLORIDE 2 MG/ML
0.5 INJECTION INTRAMUSCULAR; INTRAVENOUS; SUBCUTANEOUS
Qty: 0 | Refills: 0 | Status: DISCONTINUED | OUTPATIENT
Start: 2019-04-03 | End: 2019-04-03

## 2019-04-03 RX ORDER — OXYCODONE AND ACETAMINOPHEN 5; 325 MG/1; MG/1
5-325 TABLET ORAL
Qty: 20 | Refills: 0 | Status: COMPLETED | COMMUNITY
Start: 2019-04-03 | End: 2019-04-08

## 2019-04-03 RX ORDER — EPINEPHRINE 11.25MG/ML
0.5 SOLUTION, NON-ORAL INHALATION ONCE
Qty: 0 | Refills: 0 | Status: COMPLETED | OUTPATIENT
Start: 2019-04-03 | End: 2019-04-03

## 2019-04-03 RX ORDER — DICLOFENAC SODIUM 30 MG/G
0 GEL TOPICAL
Qty: 0 | Refills: 0 | COMMUNITY

## 2019-04-03 RX ADMIN — EPINEPHRINE 0.3 MILLIGRAM(S): 0.3 INJECTION INTRAMUSCULAR; SUBCUTANEOUS at 16:42

## 2019-04-03 RX ADMIN — HYDROMORPHONE HYDROCHLORIDE 0.5 MILLIGRAM(S): 2 INJECTION INTRAMUSCULAR; INTRAVENOUS; SUBCUTANEOUS at 15:29

## 2019-04-03 RX ADMIN — Medication 0.5 MILLILITER(S): at 20:58

## 2019-04-03 RX ADMIN — HYDROMORPHONE HYDROCHLORIDE 0.5 MILLIGRAM(S): 2 INJECTION INTRAMUSCULAR; INTRAVENOUS; SUBCUTANEOUS at 14:20

## 2019-04-03 RX ADMIN — HYDROMORPHONE HYDROCHLORIDE 0.5 MILLIGRAM(S): 2 INJECTION INTRAMUSCULAR; INTRAVENOUS; SUBCUTANEOUS at 17:04

## 2019-04-03 RX ADMIN — Medication 100 MILLIGRAM(S): at 19:57

## 2019-04-03 RX ADMIN — SODIUM CHLORIDE 100 MILLILITER(S): 9 INJECTION, SOLUTION INTRAVENOUS at 14:30

## 2019-04-03 RX ADMIN — HYDROMORPHONE HYDROCHLORIDE 0.5 MILLIGRAM(S): 2 INJECTION INTRAMUSCULAR; INTRAVENOUS; SUBCUTANEOUS at 14:16

## 2019-04-03 RX ADMIN — Medication 0.5 MILLILITER(S): at 17:24

## 2019-04-03 RX ADMIN — EPINEPHRINE 0.3 MILLIGRAM(S): 0.3 INJECTION INTRAMUSCULAR; SUBCUTANEOUS at 18:25

## 2019-04-03 RX ADMIN — ALBUTEROL 2.5 MILLIGRAM(S): 90 AEROSOL, METERED ORAL at 16:55

## 2019-04-03 RX ADMIN — Medication 101.6 MILLIGRAM(S): at 21:21

## 2019-04-03 RX ADMIN — FAMOTIDINE 20 MILLIGRAM(S): 10 INJECTION INTRAVENOUS at 19:56

## 2019-04-03 RX ADMIN — BUDESONIDE AND FORMOTEROL FUMARATE DIHYDRATE 2 PUFF(S): 160; 4.5 AEROSOL RESPIRATORY (INHALATION) at 22:05

## 2019-04-03 RX ADMIN — Medication 50 MILLIGRAM(S): at 17:00

## 2019-04-03 NOTE — H&P ADULT - ASSESSMENT
73 yo F with anaphylactic reaction to medication    Plan  SICU management  Possible intubation if stridor and desaturation  Dr. Stewart notified

## 2019-04-03 NOTE — CONSULT NOTE ADULT - SUBJECTIVE AND OBJECTIVE BOX
SICU Consultation Note  =====================================================  HPI:    73 y/o F with a PMHx of HLD, HTN, asthma, hypothyroidism and anxiety. Found to have L upper quadrant malignant neoplasm estrogen receptor positive, HER2 negative in the L breast on stereotactic core biopsy 1/22/19. Patient had no family history of breast/ovarian cancer. Today patient under went L breast lumpectomy w/ sentinel lymphnode biopsy. Patient was intubated, considered to be a difficult intubation by anesthesia. Sedated with general anesthesia. Hemodynamically stable throughout the case. While in the Ambulatory PACU. Anesthesia noted her lips and tongue to be swelling. At the time patient was tachycardic to 110's and 's. Anesthesia gave pepcid, solucortef, benedryl and nebulizer treatment. Patient had no improvement. SICU team was called to the bedside.     Patient was noted to not have any prior allergies. Patient has had surgery in the past and tolerated general anesthesia without any problems. Patient did not recieve lymphazurin blue during todays case. Patient does not take any ace inhibitors at home.     SICU team found patient to be hypertensive to 's, tachycardic to 110's and O2 saturation 91%. Patient was noted to have clear breath sounds, no evidence of stridor. 1 dose of epinephrine IM given stat, decadron 10 given stat and patient received racemic epi nebulizer treatment all in [facility] Ambulatory. Dr Marshall was at bedside with Anesthesia and determined that it was not emergent to intubate the patient. She would be transferred to SICU for close monitoring.          Active Problems  Abnormal finding on breast imaging (793.89) (R92.8)  Right benign breast tissue with proliferative type fibrocystic changes on MR guided core biopsy 3/7/19; Central, 7 mm (cork).   Asthma (493.90) (J45.909)  Dysphagia, unspecified type (787.20) (R13.10)  Hearing loss (389.9) (H91.90)  Hyperlipidemia (272.4) (E78.5)  Hypertension (401.9) (I10)  Hypothyroid (244.9) (E03.9)  Malignant neoplasm of upper-outer quadrant of left breast in female, estrogen receptor  positive (174.4,V86.0) (C50.412,Z17.0)  Osteoarthritis (715.90) (M19.90)  Sensorineural hearing loss (SNHL) of both ears (389.18) (H90.3)  Thyroid goiter (240.9) (E04.9)  Thyroid nodule (241.0) (E04.1)    Past Medical History  Menstrual Hx: The patient is postmenopausal and has no history of hormone replacement treatment. age at menarche was 14. age at menopause was 51.   Past Gyn Surgeries: Unilateral oophorectomy due to fibroids 1988.   Prior pregnancies: G 1 and P 1 . Age at live birth: 27   Fertility Treatments: none.   Birth Control Pill Use: OCP for 0-5 years; discontinued in 1988.   Breast Feeding History: none      Current Meds  Aspirin 81 MG Oral Tablet Chewable  Atorvastatin Calcium 40 MG Oral Tablet  Calcitriol 0.5 MCG Oral Capsule  Diclo Gel 1 % Transdermal Kit  Escitalopram Oxalate 10 MG Oral Tablet  Levothyroxine Sodium 100 MCG Oral Tablet  Losartan Potassium-HCTZ 50-12.5 MG Oral Tablet  Montelukast Sodium 10 MG Oral Tablet  ProAir HFA AERS  Symbicort 160-4.5 MCG/ACT Inhalation Aerosol  Tobramycin-Dexamethasone 0.3-0.1 % Ophthalmic Suspension  Zolpidem Tartrate 5 MG Oral Tablet    Allergies  No Known Drug Allergies      PAST MEDICAL & SURGICAL HISTORY:  OA (osteoarthritis)  Breast cancer: LEFT  PVD (peripheral vascular disease)  HTN (hypertension)  Asthma: NEVER HAD ATTACK- ALLERGY INDUCED  H/O thyroidectomy: 2017      Allergies    No Known Allergies    Intolerances      SH:  Home Medications:  aspirin 81 mg oral tablet, chewable: 1 tab(s) orally once a day (03 Apr 2019 09:13)  calcitriol 0.5 mcg oral capsule: 1 cap(s) orally once a day (03 Apr 2019 09:13)  levothyroxine 150 mcg (0.15 mg) oral tablet: 1 tab(s) orally once a day (03 Apr 2019 09:13)  losartan-hydrochlorothiazide 50mg-12.5mg oral tablet: 1 tab(s) orally once a day (03 Apr 2019 09:13)  montelukast 10 mg oral tablet: 1 tab(s) orally once a day (03 Apr 2019 09:13)  Supartz 10 mg/mL intra-articular solution:  (03 Apr 2019 09:13)  Symbicort 160 mcg-4.5 mcg/inh inhalation aerosol: 2 puff(s) inhaled 2 times a day (03 Apr 2019 09:13)    Advanced Directives: Full Code     ROS:  [x] A ten-point review of systems was otherwise negative except as noted.  [ ] Due to altered mental status/intubation, subjective information were not able to be obtained from the patient. History was obtained, to the extent possible, from review of the chart and collateral sources of information.      CURRENT MEDICATIONS:   --------------------------------------------------------------------------------------  Neurologic Medications  ondansetron Injectable 4 milliGRAM(s) IV Push once PRN Nausea and/or Vomiting    Respiratory Medications  buDESOnide 160 MICROgram(s)/formoterol 4.5 MICROgram(s) Inhaler 2 Puff(s) Inhalation two times a day  racepinephrine  2.25% Inhalation 0.5 milliLiter(s) Inhalation every 3 hours PRN sob  racepinephrine  2.25% Inhalation 0.5 milliLiter(s) Inhalation once    Cardiovascular Medications  EPINEPHrine     1 mG/mL Injectable 0.3 milliGRAM(s) IntraMuscular once  EPINEPHrine     1 mG/mL Injectable 0.3 milliGRAM(s) IntraMuscular once    Gastrointestinal Medications  famotidine Injectable 10 milliGRAM(s) IV Push once  famotidine Injectable 20 milliGRAM(s) IV Push once    Genitourinary Medications    Hematologic/Oncologic Medications    Antimicrobial/Immunologic Medications    Endocrine/Metabolic Medications  dexamethasone  Injectable 8 milliGRAM(s) IV Push every 6 hours  dexamethasone  Injectable 10 milliGRAM(s) IV Push once  hydrocortisone sodium succinate Injectable 100 milliGRAM(s) IV Push once    Topical/Other Medications    --------------------------------------------------------------------------------------    Vital Signs Last 24 Hrs  T(C): 36.6 (03 Apr 2019 13:52), Max: 36.7 (03 Apr 2019 07:39)  T(F): 97.9 (03 Apr 2019 13:52), Max: 98.1 (03 Apr 2019 07:39)  HR: 115 (03 Apr 2019 17:12) (71 - 115)  BP: 192/90 (03 Apr 2019 17:12) (137/89 - 194/92)  BP(mean): --  RR: 20 (03 Apr 2019 17:12) (20 - 28)  SpO2: 92% (03 Apr 2019 17:12) (92% - 100%)  I&O's Detail    03 Apr 2019 07:01  -  03 Apr 2019 18:15  --------------------------------------------------------  IN:    Oral Fluid: 125 mL  Total IN: 125 mL    OUT:  Total OUT: 0 mL    Total NET: 125 mL        I&O's Summary    03 Apr 2019 07:01  -  03 Apr 2019 18:15  --------------------------------------------------------  IN: 125 mL / OUT: 0 mL / NET: 125 mL        LABS:  ABG - ( 03 Apr 2019 18:02 )  pH, Arterial: 7.38  pH, Blood: x     /  pCO2: 49    /  pO2: 88    / HCO3: 29    / Base Excess: 2.6   /  SaO2: 97                          EXAM:  General/Neuro: A&Ox3. Follows commands. PERRL.   HEENT: swelling of lips and tongue. Voice hoarse but understandable.     Respiratory  Exam: Lungs clear to auscultation, Normal expansion/effort.  No stridor noted.     L breast wound clean dry and intact. No evidence of hematoma.     Cardiovascular  Exam: S1, S2.  Regular rate and rhythm.   Cardiac Rhythm: Normal Sinus Rhythm    GI  Exam: Abdomen soft, Non-tender, Non-distended.    Current Diet:  NPO    Extremities  Exam: Extremities warm, pink, well-perfused.  Upper extremities swollen.     Derm:  Exam: Good skin turgor, no skin breakdown.      :   Exam: No cotton     Tubes/Lines/Drains  ***  [x] Peripheral IV

## 2019-04-03 NOTE — ASU DISCHARGE PLAN (ADULT/PEDIATRIC) - CARE PROVIDER_API CALL
Bebeto Stewart)  Surgery  [facility], [address] [place] [address]  Phone: [phone]  Fax: [phone]  Follow Up Time: 1 week

## 2019-04-03 NOTE — H&P ADULT - HISTORY OF PRESENT ILLNESS
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  Estrogen receptor positive %  Progesterone receptor positive %  HER2 negative Negative 0  Ki-67: 3%    Left breast fibrocystic changes non proliferative type with microcalcifications on stereotactic biopsy 1/22/19; 12:00 Posterior, 6 cm area of calcifications (infinity).    No prior complaints related to the breasts.  No family history of breast or ovarian cancer.     Right benign breast tissue with proliferative type fibrocystic changes on MR guided core biopsy 3/7/19; Central, 7 mm (cork).        Active Problems  Abnormal finding on breast imaging (793.89) (R92.8)  Asthma (493.90) (J45.909)  Dysphagia, unspecified type (787.20) (R13.10)  Hearing loss (389.9) (H91.90)  Hyperlipidemia (272.4) (E78.5)  Hypertension (401.9) (I10)  Hypothyroid (244.9) (E03.9)  Malignant neoplasm of upper-outer quadrant of left breast in female, estrogen receptor  positive (174.4,V86.0) (C50.412,Z17.0)  Osteoarthritis (715.90) (M19.90)  Sensorineural hearing loss (SNHL) of both ears (389.18) (H90.3)  Thyroid goiter (240.9) (E04.9)  Thyroid nodule (241.0) (E04.1)    Past Medical History    Menstrual Hx: The patient is postmenopausal and has no history of hormone replacement treatment. age at menarche was 14. age at menopause was 51.   Past Gyn Surgeries: Unilateral oophorectomy due to fibroids 1988.   Prior pregnancies: G 1 and P 1 . Age at live birth: 27   Fertility Treatments: none.   Birth Control Pill Use: OCP for 0-5 years; discontinued in 1988.   Breast Feeding History: none      Current Meds  Aspirin 81 MG Oral Tablet Chewable  Atorvastatin Calcium 40 MG Oral Tablet  Calcitriol 0.5 MCG Oral Capsule  Diclo Gel 1 % Transdermal Kit  Escitalopram Oxalate 10 MG Oral Tablet  Levothyroxine Sodium 100 MCG Oral Tablet  Losartan Potassium-HCTZ 50-12.5 MG Oral Tablet  Montelukast Sodium 10 MG Oral Tablet  ProAir HFA AERS  Symbicort 160-4.5 MCG/ACT Inhalation Aerosol  Tobramycin-Dexamethasone 0.3-0.1 % Ophthalmic Suspension  Zolpidem Tartrate 5 MG Oral Tablet    Allergies  No Known Drug Allergies    Patient had elective outpatient left breast lumpectomy with sentinel lymph node biopsy for breast cancer. During the procedure patient had difficult intubation, required dexametasone. Procedure was uncompicated. In PACU in 1 hour patient started to have SOB, stridor, swelling of throat and neck. Did not have desaturation, but was tachy 110, SBP stable. Immediate SICU, Breast Surgery and Anesthesia evaluation was obtained and patient received 0.3 mg epi IM, pepcid 20 mg, benadryl 50 mg, hydrocortisone 100 mg, albuterol nebulizer, racemic epi nebulizer. Patient felt better and did not require intubation at that time. Transfered to SICU for observation and further management.

## 2019-04-03 NOTE — CONSULT NOTE ADULT - ASSESSMENT
ASSESSMENT/PLAN: 74yFemale      Neurologic: Monitor for changes in mental status. Patient has no pain currently. Restarted home ambien and escitalopram.     Respiratory: Placed patient on a face mask with end title CO2 monitoring. ABG. AM CXR. Hx of asthma restarted home symbicort and montelukast. Racemic Epi nebulizer treatment stat and then Q 3 hours. Sent tryptase for allergy testing.     Cardiovascular: Hx of HTN and HLD. Restarted home losartan, HCTZ, and atorvastatin. EKG NSR. 3 sets of cardiac enzymes ordered, pending results.     Gastrointestinal/Nutrition: NPO. IVL. Pepcid 10 stat.     Genitourinary/Renal: No cotton. Voiding. Monitor lytes replete as needed.     Hematologic: Restart home ASA. SCD in place. Monitor H/H     Infectious Disease: Afebrile. Trend WBC     Endocrine: Decadron 10 stat. Decadron 8 Q 6. FS Q 4 hours.     Disposition: SICU

## 2019-04-03 NOTE — BRIEF OPERATIVE NOTE - NSICDXBRIEFPROCEDURE_GEN_ALL_CORE_FT
PROCEDURES:  Lumpectomy of left breast after needle localization 03-Apr-2019 09:37:32 bracketed; of two areas. Lexii Duque PROCEDURES:  Biopsy, lymph node, sentinel, extremity 03-Apr-2019 10:50:15  Lexii Duque  Lumpectomy of left breast after needle localization 03-Apr-2019 09:37:32 bracketed; of two areas. Lexii Duque

## 2019-04-03 NOTE — ASU DISCHARGE PLAN (ADULT/PEDIATRIC) - ASU DC SPECIAL INSTRUCTIONSFT
Regular Diet.    No heavy lifting more than 15 pounds for two weeks.    Please remove plastic dressings in three days.  Leave white tape and stitches in place.  Do not cut stitches.  May shower tomorrow.  Wear a supportive bra.

## 2019-04-03 NOTE — CHART NOTE - NSCHARTNOTEFT_GEN_A_CORE
Called to PACU to evaluate patient complaining of difficulty breathing. Pt is s/p breast surgery today. Per Dr. Rivas she was a difficult intubation (glidescope x2). At bedside patient was hypertensive systolic 190, saturating 97% on room air. She had significant swelling of her lips and tongue. There was upper airway stridor. She did not have any obvious hives but according to OR tech she had some rash on her neck (per Dr. Rivas and family it was present preop). Decision was made to treat for possible allergy vs angioedema. Gave 300mcg epi IM, pepcid 20mg IVP, Benadryl 50mg IVP, hydrocortisone 100mg IVP, albuterol nebulizer. I spoke with Dr. Stewart and called an ICU consult. ICU team arrived along with Dr. Marshall. I also called for additional anesthesia help (2 attendings and 1 CRNA). During this time we prepared the OR for awake fiberoptic with trach as a backup. We discussed reintubation with patient and family vs transfer to ICU with continued monitoring. After considerable discussion and the fact that patient remained stable Dr. Marshall suggested that we hold off on reintubation and go to the ICU for monitoring. I accompanied patient and ICU team to the ICU.     Jona Meyer MD

## 2019-04-03 NOTE — CONSULT NOTE ADULT - ATTENDING COMMENTS
anaphylactic reaction with angio edema   medical treatment   ABG   ETCO2  admit to SICU   Trache/ Criche kit at bedside   family and surgeon aware

## 2019-04-03 NOTE — H&P ADULT - NSHPPHYSICALEXAM_GEN_ALL_CORE
Gen: a&ox3, sob  Lungs: wheezing b/l  Abd: soft, NT, ND  Extremities: erythematous rash on b/l forearms  Breast: L axilla and L breast with clean dressing, no signs of hematoma

## 2019-04-03 NOTE — BRIEF OPERATIVE NOTE - NSICDXBRIEFPOSTOP_GEN_ALL_CORE_FT
POST-OP DIAGNOSIS:  Carcinoma of breast upper outer quadrant, left 03-Apr-2019 09:37:55  Lexii Duque POST-OP DIAGNOSIS:  Carcinoma of breast upper outer quadrant, left 03-Apr-2019 09:37:55  eLxii Duque

## 2019-04-03 NOTE — PRE-ANESTHESIA EVALUATION ADULT - NSANTHOSAYNRD_GEN_A_CORE
No. RAYMOND screening performed.  STOP BANG Legend: 0-2 = LOW Risk; 3-4 = INTERMEDIATE Risk; 5-8 = HIGH Risk/see tool

## 2019-04-03 NOTE — BRIEF OPERATIVE NOTE - OPERATION/FINDINGS
Left breast sentinel lymph nodes x2, both benign on frozen section, left breast bracketed needle localized mass of two areas, with left breast anterior, medial, lateral and inferior surgical margins.

## 2019-04-04 ENCOUNTER — TRANSCRIPTION ENCOUNTER (OUTPATIENT)
Age: 74
End: 2019-04-04

## 2019-04-04 DIAGNOSIS — Z02.9 ENCOUNTER FOR ADMINISTRATIVE EXAMINATIONS, UNSPECIFIED: ICD-10-CM

## 2019-04-04 LAB
ANION GAP SERPL CALC-SCNC: 11 MMOL/L — SIGNIFICANT CHANGE UP (ref 7–14)
APTT BLD: 30.8 SEC — SIGNIFICANT CHANGE UP (ref 27–39.2)
BUN SERPL-MCNC: 13 MG/DL — SIGNIFICANT CHANGE UP (ref 10–20)
CALCIUM SERPL-MCNC: 8.4 MG/DL — LOW (ref 8.5–10.1)
CHLORIDE SERPL-SCNC: 103 MMOL/L — SIGNIFICANT CHANGE UP (ref 98–110)
CK MB CFR SERPL CALC: 2.1 NG/ML — SIGNIFICANT CHANGE UP (ref 0.6–6.3)
CK MB CFR SERPL CALC: 2.2 NG/ML — SIGNIFICANT CHANGE UP (ref 0.6–6.3)
CK SERPL-CCNC: 136 U/L — SIGNIFICANT CHANGE UP (ref 0–225)
CK SERPL-CCNC: 148 U/L — SIGNIFICANT CHANGE UP (ref 0–225)
CO2 SERPL-SCNC: 24 MMOL/L — SIGNIFICANT CHANGE UP (ref 17–32)
CREAT SERPL-MCNC: 0.7 MG/DL — SIGNIFICANT CHANGE UP (ref 0.7–1.5)
ESTIMATED AVERAGE GLUCOSE: 114 MG/DL — SIGNIFICANT CHANGE UP (ref 68–114)
GLUCOSE BLDC GLUCOMTR-MCNC: 118 MG/DL — HIGH (ref 70–99)
GLUCOSE BLDC GLUCOMTR-MCNC: 127 MG/DL — HIGH (ref 70–99)
GLUCOSE BLDC GLUCOMTR-MCNC: 128 MG/DL — HIGH (ref 70–99)
GLUCOSE BLDC GLUCOMTR-MCNC: 135 MG/DL — HIGH (ref 70–99)
GLUCOSE SERPL-MCNC: 154 MG/DL — HIGH (ref 70–99)
HBA1C BLD-MCNC: 5.6 % — SIGNIFICANT CHANGE UP (ref 4–5.6)
HCT VFR BLD CALC: 38.7 % — SIGNIFICANT CHANGE UP (ref 37–47)
HGB BLD-MCNC: 12.7 G/DL — SIGNIFICANT CHANGE UP (ref 12–16)
INR BLD: 1.04 RATIO — SIGNIFICANT CHANGE UP (ref 0.65–1.3)
MAGNESIUM SERPL-MCNC: 2.2 MG/DL — SIGNIFICANT CHANGE UP (ref 1.8–2.4)
MCHC RBC-ENTMCNC: 30.4 PG — SIGNIFICANT CHANGE UP (ref 27–31)
MCHC RBC-ENTMCNC: 32.8 G/DL — SIGNIFICANT CHANGE UP (ref 32–37)
MCV RBC AUTO: 92.6 FL — SIGNIFICANT CHANGE UP (ref 81–99)
NRBC # BLD: 0 /100 WBCS — SIGNIFICANT CHANGE UP (ref 0–0)
PHOSPHATE SERPL-MCNC: 4.1 MG/DL — SIGNIFICANT CHANGE UP (ref 2.1–4.9)
PLATELET # BLD AUTO: 161 K/UL — SIGNIFICANT CHANGE UP (ref 130–400)
POTASSIUM SERPL-MCNC: 4.5 MMOL/L — SIGNIFICANT CHANGE UP (ref 3.5–5)
POTASSIUM SERPL-SCNC: 4.5 MMOL/L — SIGNIFICANT CHANGE UP (ref 3.5–5)
PROTHROM AB SERPL-ACNC: 12 SEC — SIGNIFICANT CHANGE UP (ref 9.95–12.87)
RBC # BLD: 4.18 M/UL — LOW (ref 4.2–5.4)
RBC # FLD: 12.4 % — SIGNIFICANT CHANGE UP (ref 11.5–14.5)
SODIUM SERPL-SCNC: 138 MMOL/L — SIGNIFICANT CHANGE UP (ref 135–146)
TROPONIN T SERPL-MCNC: <0.01 NG/ML — SIGNIFICANT CHANGE UP
TROPONIN T SERPL-MCNC: <0.01 NG/ML — SIGNIFICANT CHANGE UP
WBC # BLD: 9.75 K/UL — SIGNIFICANT CHANGE UP (ref 4.8–10.8)
WBC # FLD AUTO: 9.75 K/UL — SIGNIFICANT CHANGE UP (ref 4.8–10.8)

## 2019-04-04 PROCEDURE — 99291 CRITICAL CARE FIRST HOUR: CPT

## 2019-04-04 PROCEDURE — 71045 X-RAY EXAM CHEST 1 VIEW: CPT | Mod: 26

## 2019-04-04 RX ORDER — LEVOTHYROXINE SODIUM 125 MCG
75 TABLET ORAL
Qty: 0 | Refills: 0 | Status: COMPLETED | OUTPATIENT
Start: 2019-04-04 | End: 2019-04-04

## 2019-04-04 RX ORDER — HEPARIN SODIUM 5000 [USP'U]/ML
5000 INJECTION INTRAVENOUS; SUBCUTANEOUS EVERY 8 HOURS
Qty: 0 | Refills: 0 | Status: DISCONTINUED | OUTPATIENT
Start: 2019-04-04 | End: 2019-04-05

## 2019-04-04 RX ORDER — LEVOTHYROXINE SODIUM 125 MCG
75 TABLET ORAL AT BEDTIME
Qty: 0 | Refills: 0 | Status: DISCONTINUED | OUTPATIENT
Start: 2019-04-04 | End: 2019-04-04

## 2019-04-04 RX ORDER — CHLORHEXIDINE GLUCONATE 213 G/1000ML
1 SOLUTION TOPICAL
Qty: 0 | Refills: 0 | Status: DISCONTINUED | OUTPATIENT
Start: 2019-04-04 | End: 2019-04-04

## 2019-04-04 RX ORDER — CHLORHEXIDINE GLUCONATE 213 G/1000ML
1 SOLUTION TOPICAL
Qty: 0 | Refills: 0 | Status: DISCONTINUED | OUTPATIENT
Start: 2019-04-04 | End: 2019-04-05

## 2019-04-04 RX ORDER — DEXAMETHASONE 0.5 MG/5ML
8 ELIXIR ORAL EVERY 6 HOURS
Qty: 0 | Refills: 0 | Status: COMPLETED | OUTPATIENT
Start: 2019-04-04 | End: 2019-04-04

## 2019-04-04 RX ORDER — SODIUM CHLORIDE 9 MG/ML
1000 INJECTION, SOLUTION INTRAVENOUS
Qty: 0 | Refills: 0 | Status: DISCONTINUED | OUTPATIENT
Start: 2019-04-04 | End: 2019-04-05

## 2019-04-04 RX ORDER — LEVOTHYROXINE SODIUM 125 MCG
150 TABLET ORAL DAILY
Qty: 0 | Refills: 0 | Status: DISCONTINUED | OUTPATIENT
Start: 2019-04-05 | End: 2019-04-05

## 2019-04-04 RX ORDER — MAGNESIUM SULFATE 500 MG/ML
1 VIAL (ML) INJECTION ONCE
Qty: 0 | Refills: 0 | Status: COMPLETED | OUTPATIENT
Start: 2019-04-04 | End: 2019-04-04

## 2019-04-04 RX ORDER — GLUCAGON INJECTION, SOLUTION 0.5 MG/.1ML
1 INJECTION, SOLUTION SUBCUTANEOUS ONCE
Qty: 0 | Refills: 0 | Status: DISCONTINUED | OUTPATIENT
Start: 2019-04-04 | End: 2019-04-04

## 2019-04-04 RX ADMIN — Medication 100 GRAM(S): at 01:49

## 2019-04-04 RX ADMIN — Medication 75 MICROGRAM(S): at 06:09

## 2019-04-04 RX ADMIN — Medication 12.5 MILLIGRAM(S): at 09:52

## 2019-04-04 RX ADMIN — Medication 81 MILLIGRAM(S): at 11:59

## 2019-04-04 RX ADMIN — Medication 101.6 MILLIGRAM(S): at 23:57

## 2019-04-04 RX ADMIN — LOSARTAN POTASSIUM 50 MILLIGRAM(S): 100 TABLET, FILM COATED ORAL at 09:52

## 2019-04-04 RX ADMIN — Medication 101.6 MILLIGRAM(S): at 00:44

## 2019-04-04 RX ADMIN — Medication 101.6 MILLIGRAM(S): at 06:09

## 2019-04-04 RX ADMIN — ESCITALOPRAM OXALATE 10 MILLIGRAM(S): 10 TABLET, FILM COATED ORAL at 17:16

## 2019-04-04 RX ADMIN — Medication 101.6 MILLIGRAM(S): at 17:40

## 2019-04-04 RX ADMIN — BUDESONIDE AND FORMOTEROL FUMARATE DIHYDRATE 2 PUFF(S): 160; 4.5 AEROSOL RESPIRATORY (INHALATION) at 21:52

## 2019-04-04 RX ADMIN — ATORVASTATIN CALCIUM 40 MILLIGRAM(S): 80 TABLET, FILM COATED ORAL at 21:52

## 2019-04-04 RX ADMIN — HEPARIN SODIUM 5000 UNIT(S): 5000 INJECTION INTRAVENOUS; SUBCUTANEOUS at 21:52

## 2019-04-04 RX ADMIN — BUDESONIDE AND FORMOTEROL FUMARATE DIHYDRATE 2 PUFF(S): 160; 4.5 AEROSOL RESPIRATORY (INHALATION) at 08:54

## 2019-04-04 RX ADMIN — Medication 101.6 MILLIGRAM(S): at 12:23

## 2019-04-04 RX ADMIN — MONTELUKAST 10 MILLIGRAM(S): 4 TABLET, CHEWABLE ORAL at 09:52

## 2019-04-04 NOTE — CHART NOTE - NSCHARTNOTEFT_GEN_A_CORE
SICU Transfer Note    EL STEIN  74y (1945)  [id number]      Transfer from: SICU  Transfer to: Surgery      SICU COURSE:  75 y/o F with a PMHx of HLD, HTN, asthma, hypothyroidism and anxiety. Found to have L upper quadrant malignant neoplasm estrogen receptor positive, HER2 negative in the L breast on stereotactic core biopsy 1/22/19. Patient had no family history of breast/ovarian cancer. Today patient under went L breast lumpectomy w/ sentinel lymphnode biopsy. Patient was intubated, considered to be a difficult intubation by anesthesia. Sedated with general anesthesia. Hemodynamically stable throughout the case. While in the Ambulatory PACU. Anesthesia noted her lips and tongue to be swelling. At the time patient was tachycardic to 110's and 's. Anesthesia gave pepcid, solucortef, benedryl and nebulizer treatment. Patient had no improvement. SICU team was called to the bedside  s/p lumpectomy and sentinel lymph node biopsy    Patient was noted to not have any prior allergies. Patient has had surgery in the past and tolerated general anesthesia without any problems. Patient did not recieve lymphazurin blue during todays case. Patient does not take any ace inhibitors at home.     SICU team found patient to be hypertensive to 's, tachycardic to 110's and O2 saturation 91%. Patient was noted to have clear breath sounds, no evidence of stridor. 1 dose of epinephrine IM given stat, decadron 10 given stat and patient received racemic epi nebulizer treatment all in [facility]. Dr Marshall was at bedside with Anesthesia and determined that it was not emergent to intubate the patient. She would be transferred to SICU for close monitoring.     Patient improved overnight, improved facial swelling and voice hoarseness.    PAST MEDICAL & SURGICAL HISTORY:  OA (osteoarthritis)  Breast cancer: LEFT  PVD (peripheral vascular disease)  HTN (hypertension)  Asthma: NEVER HAD ATTACK- ALLERGY INDUCED  H/O thyroidectomy: 2017    Allergies    No Known Allergies    Intolerances      MEDICATIONS  (STANDING):  aspirin enteric coated 81 milliGRAM(s) Oral daily  atorvastatin 40 milliGRAM(s) Oral at bedtime  buDESOnide 160 MICROgram(s)/formoterol 4.5 MICROgram(s) Inhaler 2 Puff(s) Inhalation two times a day  chlorhexidine 4% Liquid 1 Application(s) Topical two times a day  dextrose 5%. 1000 milliLiter(s) (50 mL/Hr) IV Continuous <Continuous>  escitalopram 10 milliGRAM(s) Oral <User Schedule>  hydrochlorothiazide 12.5 milliGRAM(s) Oral <User Schedule>  losartan 50 milliGRAM(s) Oral <User Schedule>  montelukast 10 milliGRAM(s) Oral <User Schedule>    MEDICATIONS  (PRN):  ondansetron Injectable 4 milliGRAM(s) IV Push once PRN Nausea and/or Vomiting  racepinephrine  2.25% Inhalation 0.5 milliLiter(s) Inhalation every 3 hours PRN sob  zolpidem 5 milliGRAM(s) Oral <User Schedule> PRN Insomnia      Vital Signs Last 24 Hrs  T(C): 37.3 (04 Apr 2019 12:04), Max: 38.3 (03 Apr 2019 20:00)  T(F): 99.1 (04 Apr 2019 12:04), Max: 101 (03 Apr 2019 20:00)  HR: 86 (04 Apr 2019 14:10) (70 - 122)  BP: 101/59 (04 Apr 2019 14:10) (93/63 - 194/92)  BP(mean): 64 (04 Apr 2019 14:10) (64 - 120)  RR: 20 (04 Apr 2019 14:10) (10 - 32)  SpO2: 96% (04 Apr 2019 14:10) (92% - 100%)  I&O's Summary    03 Apr 2019 07:01  -  04 Apr 2019 07:00  --------------------------------------------------------  IN: 375 mL / OUT: 300 mL / NET: 75 mL    04 Apr 2019 07:01  -  04 Apr 2019 15:12  --------------------------------------------------------  IN: 590 mL / OUT: 500 mL / NET: 90 mL        LABS  LABS:  CAPILLARY BLOOD GLUCOSE      POCT Blood Glucose.: 128 mg/dL (04 Apr 2019 12:25)  POCT Blood Glucose.: 127 mg/dL (04 Apr 2019 08:42)  POCT Blood Glucose.: 135 mg/dL (04 Apr 2019 06:19)  POCT Blood Glucose.: 118 mg/dL (04 Apr 2019 01:56)  POCT Blood Glucose.: 126 mg/dL (03 Apr 2019 22:07)                          12.7   9.75  )-----------( 161      ( 04 Apr 2019 02:59 )             38.7       04-04    138  |  103  |  13  ----------------------------<  154<H>  4.5   |  24  |  0.7    Ca    8.4<L>      04 Apr 2019 02:59  Phos  4.1     04-04  Mg     2.2     04-04        PT/INR - ( 04 Apr 2019 02:59 )   PT: 12.00 sec;   INR: 1.04 ratio         PTT - ( 04 Apr 2019 02:59 )  PTT:30.8 sec  CARDIAC MARKERS ( 04 Apr 2019 05:05 )  x     / <0.01 ng/mL / 136 U/L / x     / 2.2 ng/mL  CARDIAC MARKERS ( 04 Apr 2019 02:59 )  x     / <0.01 ng/mL / 148 U/L / x     / 2.1 ng/mL  CARDIAC MARKERS ( 03 Apr 2019 18:00 )  x     / <0.01 ng/mL / 196 U/L / x     / 3.5 ng/mL                Assesment & Plan:    74yFemale s/p lumpectomy and sentinel lymph node biopsy    Neurologic: Monitor for changes in mental status. Patient has no pain currently. Restarted home ambien and escitalopram.     Respiratory: weaned to RA as tolerated. AM CXR. Hx of asthma restarted home symbicort and montelukast. Racemic Epi nebulizer treatment  Q 3 hours PRN. f/u tryptase for allergy testing.     Cardiovascular: Hx of HTN and HLD. Restarted home losartan, HCTZ, and atorvastatin. EKG NSR. 3 sets of cardiac enzymes ordered, 1st -ve f/u remainder      Gastrointestinal/Nutrition: diet advanced today    Genitourinary/Renal: No cotton. Voiding. Monitor lytes     Hematologic: Restart home ASA. SCD in place. Monitor H/H     Infectious Disease: Afebrile. Trend WBC     Endocrine: no longer receiving decadron for anaphylactic reaction     Follow Up:  - follow up with Dr. Stewart regarding further management   - follow up with 11:30pm labs      Signed out to: Dr. Hill  Date: 4/4/19  Time: 4501

## 2019-04-04 NOTE — PHARMACOTHERAPY INTERVENTION NOTE - COMMENTS
spoke to nurse to confirm that this morning the patient will take only iv levothyroxine and she will hold oral levothyroxine

## 2019-04-04 NOTE — DISCHARGE NOTE NURSING/CASE MANAGEMENT/SOCIAL WORK - NSDCPEEMAIL_GEN_ALL_CORE
[facility] for Tobacco Control email [email or web address]

## 2019-04-04 NOTE — PROGRESS NOTE ADULT - ATTENDING COMMENTS
feeling much better  resolved lips edema   improved hoarse voice   resume diet   IVL   OOB and ambulate   d/c Home   f/u with PMD for allergic reaction w/u

## 2019-04-04 NOTE — PROGRESS NOTE ADULT - ASSESSMENT
ASSESSMENT/PLAN: 74yFemale      Neurologic: Monitor for changes in mental status. Patient has no pain currently. Restarted home ambien and escitalopram.     Respiratory: Placed patient on a face mask with end title CO2 monitoring, wenaed to NC, plan to wean to RA as tolerated. ABG. AM CXR. Hx of asthma restarted home symbicort and montelukast. Racemic Epi nebulizer treatment  Q 3 hours PRN. f/utryptase for allergy testing.     Cardiovascular: Hx of HTN and HLD. Restarted home losartan, HCTZ, and atorvastatin. EKG NSR. 3 sets of cardiac enzymes ordered, 1st -ve f/u remainder      Gastrointestinal/Nutrition: NPO. IVL. Pepcid 10 stat.     Genitourinary/Renal: No cotton. Voiding. Monitor lytes replete as needed.     Hematologic: Restart home ASA. SCD in place. Monitor H/H     Infectious Disease: Afebrile. Trend WBC     Endocrine: . Decadron 8 Q 6. FS Q 4 hours.     Disposition: SICU ASSESSMENT/PLAN: 74yFemale      Neurologic: Monitor for changes in mental status. Patient has no pain currently. Restarted home ambien and escitalopram.     Respiratory: Placed patient on a face mask with end title CO2 monitoring, wenaed to NC, plan to wean to RA as tolerated. ABG. AM CXR. Hx of asthma restarted home symbicort and montelukast. Racemic Epi nebulizer treatment  Q 3 hours PRN. f/utryptase for allergy testing.     Cardiovascular: Hx of HTN and HLD. Restarted home losartan, HCTZ, and atorvastatin. EKG NSR. 3 sets of cardiac enzymes ordered, 1st -ve f/u remainder      Gastrointestinal/Nutrition: NPO currently, can advance diet today    Genitourinary/Renal: No cotton. Voiding. Monitor lytes replete as needed.     Hematologic: Restart home ASA. SCD in place. Monitor H/H     Infectious Disease: Afebrile. Trend WBC     Endocrine: . Decadron 8 Q 6. FS Q 4 hours.     Disposition: SICU ASSESSMENT/PLAN: 74yFemale      Neurologic: Monitor for changes in mental status. Patient has no pain currently. Restarted home ambien and escitalopram.     Respiratory: Placed patient on a face mask with end title CO2 monitoring, wenaed to NC, plan to wean to RA as tolerated. ABG. AM CXR. Hx of asthma restarted home symbicort and montelukast. Racemic Epi nebulizer treatment  Q 3 hours PRN. f/utryptase for allergy testing.     Cardiovascular: Hx of HTN and HLD. Restarted home losartan, HCTZ, and atorvastatin. EKG NSR. 3 sets of cardiac enzymes ordered, 1st -ve f/u remainder      Gastrointestinal/Nutrition: NPO currently, can advance diet today    Genitourinary/Renal: No cotton. Voiding. Monitor lytes replete as needed.     Hematologic: Restart home ASA. SCD in place. Monitor H/H     Infectious Disease: Afebrile. Trend WBC     Endocrine: . Decadron 8 Q 6. FS Q 4 hours.     Disposition: Discharge        -------  Spoke with primary team, Sergio VERA about important recommendations for d/c with epi pen as well as proper education on use. Pt also will need close follow up with Allergist for evaluation and further testing.

## 2019-04-04 NOTE — PROGRESS NOTE ADULT - SUBJECTIVE AND OBJECTIVE BOX
Progress Note: Surgery  Patient: EL STEIN , 74y (1945)Female   MRN: [id number]  Location: [facility] [address]  Visit: 04-03-19 Inpatient  Date: 04-04-19 @ 00:55    Hospital Day: 2    Post-op Day: 1    Procedure/Injury: s/p left breast lumpectomy x2 with anaphylactic reaction to unknown substance in recovery      Events over 24h: patient doing well, no additional reactions overnight, saturating well on oxygen, ambulating, febrile overnight with Tmax of 101.0    Vitals: T(F): 100.4 (04-04-19 @ 00:00), Max: 101 (04-03-19 @ 20:00)  HR: 96 (04-04-19 @ 00:30)  BP: 141/82 (04-04-19 @ 00:30) (133/76 - 194/92)  RR: 26 (04-04-19 @ 00:30)  SpO2: 98% (04-04-19 @ 00:30)    Diet: Diet, NPO (04-03-19 @ 17:51)    Out:   04-03-19 @ 07:01  -  04-04-19 @ 00:55  --------------------------------------------------------  OUT:    Voided: 300 mL  Total OUT: 300 mL    Physical Examination:  General: NAD, lying in bed comfortably   HEENT: NCAT, CAROLIN, WNL  Heart: S1 S2, No MRG RRR   Lungs: CTABL +BS Equal BL, dressings in place over left lumpectomy sites  Abdomen: Soft, non-distended, nontender.  MSK/Extremities: ROM intact BL UE/LE.   Incisions/Wounds: Dressings in place, clean, dry and intact, no signs of infection/active bleeding/drainage    Medications: [Standing]  aspirin enteric coated 81 milliGRAM(s) Oral daily  atorvastatin 40 milliGRAM(s) Oral at bedtime  buDESOnide 160 MICROgram(s)/formoterol 4.5 MICROgram(s) Inhaler 2 Puff(s) Inhalation two times a day  dexamethasone  IVPB 8 milliGRAM(s) IV Intermittent every 6 hours  escitalopram 10 milliGRAM(s) Oral <User Schedule>  hydrochlorothiazide 12.5 milliGRAM(s) Oral <User Schedule>  levothyroxine 150 MICROGram(s) Oral daily  losartan 50 milliGRAM(s) Oral <User Schedule>  montelukast 10 milliGRAM(s) Oral <User Schedule>    Medications:[PRN]  ondansetron Injectable 4 milliGRAM(s) IV Push once PRN  racepinephrine  2.25% Inhalation 0.5 milliLiter(s) Inhalation every 3 hours PRN  zolpidem 5 milliGRAM(s) Oral <User Schedule> PRN    Labs:                        13.5   12.87 )-----------( 196      ( 03 Apr 2019 18:00 )             42.2     04-03    141  |  102  |  14  ----------------------------<  161<H>  4.4   |  21  |  0.7    Ca    8.7      03 Apr 2019 18:00  Phos  3.2     04-03  Mg     1.7     04-03      ABG - ( 03 Apr 2019 18:02 )  pH: 7.38  /  pCO2: 49    /  pO2: 88    / HCO3: 29    / Base Excess: 2.6   /  SaO2: 97        CARDIAC MARKERS ( 03 Apr 2019 18:00 )  x     / <0.01 ng/mL / 196 U/L / x     / 3.5 ng/mL      Date/Time: 04-04-19 @ 00:55

## 2019-04-04 NOTE — PROGRESS NOTE ADULT - SUBJECTIVE AND OBJECTIVE BOX
EL STEIN  [id number]  74y Female    Indication for ICU admission: Angioedema. Hemodynamic Monitoring     Admit Date:04-03-19  ICU Date: 4/3/19   OR Date: 4/3/19     No Known Allergies    PAST MEDICAL & SURGICAL HISTORY:  OA (osteoarthritis)  Breast cancer: LEFT  PVD (peripheral vascular disease)  HTN (hypertension)  Asthma: NEVER HAD ATTACK- ALLERGY INDUCED  H/O thyroidectomy: 2017    Home Medications:  aspirin 81 mg oral tablet, chewable: 1 tab(s) orally once a day (03 Apr 2019 09:13)  calcitriol 0.5 mcg oral capsule: 1 cap(s) orally once a day (03 Apr 2019 09:13)  levothyroxine 150 mcg (0.15 mg) oral tablet: 1 tab(s) orally once a day (03 Apr 2019 09:13)  losartan-hydrochlorothiazide 50mg-12.5mg oral tablet: 1 tab(s) orally once a day (03 Apr 2019 09:13)  montelukast 10 mg oral tablet: 1 tab(s) orally once a day (03 Apr 2019 09:13)  Supartz 10 mg/mL intra-articular solution:  (03 Apr 2019 09:13)  Symbicort 160 mcg-4.5 mcg/inh inhalation aerosol: 2 puff(s) inhaled 2 times a day (03 Apr 2019 09:13)    75 y/o F with a PMHx of HLD, HTN, asthma, hypothyroidism and anxiety. Found to have L upper quadrant malignant neoplasm estrogen receptor positive, HER2 negative in the L breast on stereotactic core biopsy 1/22/19. Patient had no family history of breast/ovarian cancer. Today patient under went L breast lumpectomy w/ sentinel lymphnode biopsy. Patient was intubated, considered to be a difficult intubation by anesthesia. Sedated with general anesthesia. Hemodynamically stable throughout the case. While in the Ambulatory PACU. Anesthesia noted her lips and tongue to be swelling. At the time patient was tachycardic to 110's and 's. Anesthesia gave pepcid, solucortef, benedryl and nebulizer treatment. Patient had no improvement. SICU team was called to the bedside.     Patient was noted to not have any prior allergies. Patient has had surgery in the past and tolerated general anesthesia without any problems. Patient did not recieve lymphazurin blue during todays case. Patient does not take any ace inhibitors at home.     SICU team found patient to be hypertensive to 's, tachycardic to 110's and O2 saturation 91%. Patient was noted to have clear breath sounds, no evidence of stridor. 1 dose of epinephrine IM given stat, decadron 10 given stat and patient received racemic epi nebulizer treatment all in [facility]. Dr Marshall was at bedside with Anesthesia and determined that it was not emergent to intubate the patient. She would be transferred to SICU for close monitoring.     24HRS EVENT:    Neurologic: Monitor for changes in mental status. Patient has no pain currently. Restarted home ambien and escitalopram.     Respiratory: Placed patient on a face mask with end title CO2 monitoring. ABG. AM CXR. Hx of asthma restarted home symbicort and montelukast. Racemic Epi nebulizer treatment stat and then Q 3 hours. Sent tryptase for allergy testing. weaned to nasal cannula overnight with adequate saturations    Cardiovascular: Hx of HTN and HLD. Restarted home losartan, HCTZ, and atorvastatin. EKG NSR. 3 sets of cardiac enzymes ordered, pending results.     Gastrointestinal/Nutrition: NPO. IVL. Pepcid 10 stat.     Genitourinary/Renal: No cotton. Voiding. Monitor lytes replete as needed.     Hematologic: Restart home ASA. SCD in place. Monitor H/H     Infectious Disease: Afebrile. Trend WBC     Endocrine: Decadron 10 stat. Decadron 8 Q 6. FS Q 4 hours. Restarted Synthroyid     Disposition: SICU     Overnight: No acute events, given ice chips    DVT PTX: SCD     GI PTX: Pepcid     ***Tubes/Lines/Drains  ***  Peripheral IV      REVIEW OF SYSTEMS    [x ] A ten-point review of systems was otherwise negative except as noted.  [ ] Due to altered mental status/intubation, subjective information were not able to be obtained from the patient. History was obtained, to the extent possible, from review of the chart and collateral sources of information. EL STEIN  [id number]  74y Female    Indication for ICU admission: Angioedema. Hemodynamic Monitoring     Admit Date:19  ICU Date: 4/3/19   OR Date: 4/3/19     No Known Allergies    PAST MEDICAL & SURGICAL HISTORY:  OA (osteoarthritis)  Breast cancer: LEFT  PVD (peripheral vascular disease)  HTN (hypertension)  Asthma: NEVER HAD ATTACK- ALLERGY INDUCED  H/O thyroidectomy:     Home Medications:  aspirin 81 mg oral tablet, chewable: 1 tab(s) orally once a day (2019 09:13)  calcitriol 0.5 mcg oral capsule: 1 cap(s) orally once a day (2019 09:13)  levothyroxine 150 mcg (0.15 mg) oral tablet: 1 tab(s) orally once a day (2019 09:13)  losartan-hydrochlorothiazide 50mg-12.5mg oral tablet: 1 tab(s) orally once a day (2019 09:13)  montelukast 10 mg oral tablet: 1 tab(s) orally once a day (2019 09:13)  Supartz 10 mg/mL intra-articular solution:  (2019 09:13)  Symbicort 160 mcg-4.5 mcg/inh inhalation aerosol: 2 puff(s) inhaled 2 times a day (2019 09:13)    73 y/o F with a PMHx of HLD, HTN, asthma, hypothyroidism and anxiety. Found to have L upper quadrant malignant neoplasm estrogen receptor positive, HER2 negative in the L breast on stereotactic core biopsy 19. Patient had no family history of breast/ovarian cancer. Today patient under went L breast lumpectomy w/ sentinel lymphnode biopsy. Patient was intubated, considered to be a difficult intubation by anesthesia. Sedated with general anesthesia. Hemodynamically stable throughout the case. While in the Ambulatory PACU. Anesthesia noted her lips and tongue to be swelling. At the time patient was tachycardic to 110's and 's. Anesthesia gave pepcid, solucortef, benedryl and nebulizer treatment. Patient had no improvement. SICU team was called to the bedside.     Patient was noted to not have any prior allergies. Patient has had surgery in the past and tolerated general anesthesia without any problems. Patient did not recieve lymphazurin blue during todays case. Patient does not take any ace inhibitors at home.     SICU team found patient to be hypertensive to 's, tachycardic to 110's and O2 saturation 91%. Patient was noted to have clear breath sounds, no evidence of stridor. 1 dose of epinephrine IM given stat, decadron 10 given stat and patient received racemic epi nebulizer treatment all in [facility]. Dr Marshall was at bedside with Anesthesia and determined that it was not emergent to intubate the patient. She would be transferred to SICU for close monitoring.     24HRS EVENT:    Neurologic: Monitor for changes in mental status. Patient has no pain currently. Restarted home ambien and escitalopram.     Respiratory: Placed patient on a face mask with end title CO2 monitoring. ABG. AM CXR. Hx of asthma restarted home symbicort and montelukast. Racemic Epi nebulizer treatment stat and then Q 3 hours. Sent tryptase for allergy testing. weaned to nasal cannula overnight with adequate saturations    Cardiovascular: Hx of HTN and HLD. Restarted home losartan, HCTZ, and atorvastatin. EKG NSR. 3 sets of cardiac enzymes ordered, pending results.     Gastrointestinal/Nutrition: NPO. IVL. Pepcid 10 stat.     Genitourinary/Renal: No cotton. Voiding. Monitor lytes replete as needed.     Hematologic: Restart home ASA. SCD in place. Monitor H/H     Infectious Disease: Afebrile. Trend WBC     Endocrine: Decadron 10 stat. Decadron 8 Q 6. FS Q 4 hours. Restarted Synthroyid     Disposition: SICU     Overnight: No acute events, given ice chips    DVT PTX: SCD     GI PTX: Pepcid     ***Tubes/Lines/Drains  ***  Peripheral IV      REVIEW OF SYSTEMS    [x ] A ten-point review of systems was otherwise negative except as noted.  [ ] Due to altered mental status/intubation, subjective information were not able to be obtained from the patient. History was obtained, to the extent possible, from review of the chart and collateral sources of information.    Daily     Daily Weight in k (2019 18:00)    Diet, NPO (19 @ 17:51)      CURRENT MEDS:  Neurologic Medications  escitalopram 10 milliGRAM(s) Oral <User Schedule>  ondansetron Injectable 4 milliGRAM(s) IV Push once PRN Nausea and/or Vomiting  zolpidem 5 milliGRAM(s) Oral <User Schedule> PRN Insomnia    Respiratory Medications  buDESOnide 160 MICROgram(s)/formoterol 4.5 MICROgram(s) Inhaler 2 Puff(s) Inhalation two times a day  montelukast 10 milliGRAM(s) Oral <User Schedule>  racepinephrine  2.25% Inhalation 0.5 milliLiter(s) Inhalation every 3 hours PRN sob    Cardiovascular Medications  hydrochlorothiazide 12.5 milliGRAM(s) Oral <User Schedule>  losartan 50 milliGRAM(s) Oral <User Schedule>    Gastrointestinal Medications  dextrose 5%. 1000 milliLiter(s) IV Continuous <Continuous>    Genitourinary Medications    Hematologic/Oncologic Medications  aspirin enteric coated 81 milliGRAM(s) Oral daily    Antimicrobial/Immunologic Medications    Endocrine/Metabolic Medications  atorvastatin 40 milliGRAM(s) Oral at bedtime  dexamethasone  IVPB 8 milliGRAM(s) IV Intermittent every 6 hours  glucagon  Injectable 1 milliGRAM(s) IntraMuscular once PRN Glucose LESS THAN 70 milligrams/deciliter    Topical/Other Medications  chlorhexidine 4% Liquid 1 Application(s) Topical two times a day      ICU Vital Signs Last 24 Hrs  T(C): 37.1 (2019 08:00), Max: 38.3 (2019 20:00)  T(F): 98.7 (2019 08:00), Max: 101 (2019 20:00)  HR: 70 (2019 08:00) (70 - 122)  BP: 124/67 (2019 08:00) (93/63 - 194/92)  BP(mean): 92 (2019 08:00) (75 - 120)  ABP: --  ABP(mean): --  RR: 24 (2019 08:00) (10 - 28)  SpO2: 99% (2019 08:00) (92% - 100%)        ABG - ( 2019 18:02 )  pH, Arterial: 7.38  pH, Blood: x     /  pCO2: 49    /  pO2: 88    / HCO3: 29    / Base Excess: 2.6   /  SaO2: 97              I&O's Summary    2019 07:01  -  2019 07:00  --------------------------------------------------------  IN: 375 mL / OUT: 300 mL / NET: 75 mL    2019 07:  -  2019 08:18  --------------------------------------------------------  IN: 0 mL / OUT: 50 mL / NET: -50 mL      I&O's Detail    2019 07:  -  2019 07:00  --------------------------------------------------------  IN:    IV PiggyBack: 250 mL    Oral Fluid: 125 mL  Total IN: 375 mL    OUT:    Voided: 300 mL  Total OUT: 300 mL    Total NET: 75 mL      2019 07:  -  2019 08:18  --------------------------------------------------------  IN:  Total IN: 0 mL    OUT:    Voided: 50 mL  Total OUT: 50 mL    Total NET: -50 mL          PHYSICAL EXAM:    General/Neuro  RASS:             GCS:     = E   / V   / M      Deficits:                             alert & oriented x 3, no focal deficits  Pupils:    Lungs:      clear to auscultation, Normal expansion/effort.     Cardiovascular : S1, S2.  Regular rate and rhythm.  Peripheral edema   Cardiac Rhythm: Normal Sinus Rhythm    GI: Abdomen soft, Non-tender, Non-distended.    Gastrostomy / Jejunostomy tube in place.  Nasogastric tube in place.  Colostomy / Ileostomy.    Wound:    Extremities: Extremities warm, pink, well-perfused. Pulses:Rt     Lt    Derm: Good skin turgor, no skin breakdown.      :       Cotton catheter in place.      CXR:       LABS:  CAPILLARY BLOOD GLUCOSE      POCT Blood Glucose.: 135 mg/dL (2019 06:19)  POCT Blood Glucose.: 118 mg/dL (2019 01:56)  POCT Blood Glucose.: 126 mg/dL (2019 22:07)                          12.7   9.75  )-----------( 161      ( 2019 02:59 )             38.7       PT  12.00 ()      INR  1.04 ()      PTT  30.8 ()          138  |  103  |  13  ----------------------------<  154<H>  4.5   |  24  |  0.7    Ca    8.4<L>      2019 02:59  Phos  4.1       Mg     2.2             PT/INR - ( 2019 02:59 )   PT: 12.00 sec;   INR: 1.04 ratio         PTT - ( 2019 02:59 )  PTT:30.8 sec  CARDIAC MARKERS ( 2019 05:05 )  x     / <0.01 ng/mL / 136 U/L / x     / 2.2 ng/mL  CARDIAC MARKERS ( 2019 02:59 )  x     / <0.01 ng/mL / 148 U/L / x     / 2.1 ng/mL  CARDIAC MARKERS ( 2019 18:00 )  x     / <0.01 ng/mL / 196 U/L / x     / 3.5 ng/mL EL STEIN  [id number]  74y Female    Indication for ICU admission: Angioedema. Hemodynamic Monitoring     Admit Date:19  ICU Date: 4/3/19   OR Date: 4/3/19     No Known Allergies    PAST MEDICAL & SURGICAL HISTORY:  OA (osteoarthritis)  Breast cancer: LEFT  PVD (peripheral vascular disease)  HTN (hypertension)  Asthma: NEVER HAD ATTACK- ALLERGY INDUCED  H/O thyroidectomy:     Home Medications:  aspirin 81 mg oral tablet, chewable: 1 tab(s) orally once a day (2019 09:13)  calcitriol 0.5 mcg oral capsule: 1 cap(s) orally once a day (2019 09:13)  levothyroxine 150 mcg (0.15 mg) oral tablet: 1 tab(s) orally once a day (2019 09:13)  losartan-hydrochlorothiazide 50mg-12.5mg oral tablet: 1 tab(s) orally once a day (2019 09:13)  montelukast 10 mg oral tablet: 1 tab(s) orally once a day (2019 09:13)  Supartz 10 mg/mL intra-articular solution:  (2019 09:13)  Symbicort 160 mcg-4.5 mcg/inh inhalation aerosol: 2 puff(s) inhaled 2 times a day (2019 09:13)    75 y/o F with a PMHx of HLD, HTN, asthma, hypothyroidism and anxiety. Found to have L upper quadrant malignant neoplasm estrogen receptor positive, HER2 negative in the L breast on stereotactic core biopsy 19. Patient had no family history of breast/ovarian cancer. Today patient under went L breast lumpectomy w/ sentinel lymphnode biopsy. Patient was intubated, considered to be a difficult intubation by anesthesia. Sedated with general anesthesia. Hemodynamically stable throughout the case. While in the Ambulatory PACU. Anesthesia noted her lips and tongue to be swelling. At the time patient was tachycardic to 110's and 's. Anesthesia gave pepcid, solucortef, benedryl and nebulizer treatment. Patient had no improvement. SICU team was called to the bedside.     Patient was noted to not have any prior allergies. Patient has had surgery in the past and tolerated general anesthesia without any problems. Patient did not recieve lymphazurin blue during todays case. Patient does not take any ace inhibitors at home.     SICU team found patient to be hypertensive to 's, tachycardic to 110's and O2 saturation 91%. Patient was noted to have clear breath sounds, no evidence of stridor. 1 dose of epinephrine IM given stat, decadron 10 given stat and patient received racemic epi nebulizer treatment all in [facility]. Dr Marshall was at bedside with Anesthesia and determined that it was not emergent to intubate the patient. She would be transferred to SICU for close monitoring.     24HRS EVENT:    Neurologic: Monitor for changes in mental status. Patient has no pain currently. Restarted home ambien and escitalopram.     Respiratory: Placed patient on a face mask with end title CO2 monitoring. ABG. AM CXR. Hx of asthma restarted home symbicort and montelukast. Racemic Epi nebulizer treatment stat and then Q 3 hours. Sent tryptase for allergy testing. weaned to nasal cannula overnight with adequate saturations    Cardiovascular: Hx of HTN and HLD. Restarted home losartan, HCTZ, and atorvastatin. EKG NSR. 3 sets of cardiac enzymes ordered, pending results.     Gastrointestinal/Nutrition: NPO. IVL. Pepcid 10 stat.     Genitourinary/Renal: No cotton. Voiding. Monitor lytes replete as needed.     Hematologic: Restart home ASA. SCD in place. Monitor H/H     Infectious Disease: Afebrile. Trend WBC     Endocrine: Decadron 10 stat. Decadron 8 Q 6. FS Q 4 hours. Restarted Synthroyid     Disposition: SICU     Overnight: No acute events, given ice chips    DVT PTX: SCD     GI PTX: Pepcid     ***Tubes/Lines/Drains  ***  Peripheral IV      REVIEW OF SYSTEMS    [x ] A ten-point review of systems was otherwise negative except as noted.  [ ] Due to altered mental status/intubation, subjective information were not able to be obtained from the patient. History was obtained, to the extent possible, from review of the chart and collateral sources of information.    Daily     Daily Weight in k (2019 18:00)    Diet, NPO (19 @ 17:51)      CURRENT MEDS:  Neurologic Medications  escitalopram 10 milliGRAM(s) Oral <User Schedule>  ondansetron Injectable 4 milliGRAM(s) IV Push once PRN Nausea and/or Vomiting  zolpidem 5 milliGRAM(s) Oral <User Schedule> PRN Insomnia    Respiratory Medications  buDESOnide 160 MICROgram(s)/formoterol 4.5 MICROgram(s) Inhaler 2 Puff(s) Inhalation two times a day  montelukast 10 milliGRAM(s) Oral <User Schedule>  racepinephrine  2.25% Inhalation 0.5 milliLiter(s) Inhalation every 3 hours PRN sob    Cardiovascular Medications  hydrochlorothiazide 12.5 milliGRAM(s) Oral <User Schedule>  losartan 50 milliGRAM(s) Oral <User Schedule>    Gastrointestinal Medications  dextrose 5%. 1000 milliLiter(s) IV Continuous <Continuous>    Genitourinary Medications    Hematologic/Oncologic Medications  aspirin enteric coated 81 milliGRAM(s) Oral daily    Antimicrobial/Immunologic Medications    Endocrine/Metabolic Medications  atorvastatin 40 milliGRAM(s) Oral at bedtime  dexamethasone  IVPB 8 milliGRAM(s) IV Intermittent every 6 hours  glucagon  Injectable 1 milliGRAM(s) IntraMuscular once PRN Glucose LESS THAN 70 milligrams/deciliter    Topical/Other Medications  chlorhexidine 4% Liquid 1 Application(s) Topical two times a day      ICU Vital Signs Last 24 Hrs  T(C): 37.1 (2019 08:00), Max: 38.3 (2019 20:00)  T(F): 98.7 (2019 08:00), Max: 101 (2019 20:00)  HR: 70 (2019 08:00) (70 - 122)  BP: 124/67 (2019 08:00) (93/63 - 194/92)  BP(mean): 92 (2019 08:00) (75 - 120)  ABP: --  ABP(mean): --  RR: 24 (2019 08:00) (10 - 28)  SpO2: 99% (2019 08:00) (92% - 100%)        ABG - ( 2019 18:02 )  pH, Arterial: 7.38  pH, Blood: x     /  pCO2: 49    /  pO2: 88    / HCO3: 29    / Base Excess: 2.6   /  SaO2: 97              I&O's Summary    2019 07:01  -  2019 07:00  --------------------------------------------------------  IN: 375 mL / OUT: 300 mL / NET: 75 mL    2019 07:  -  2019 08:18  --------------------------------------------------------  IN: 0 mL / OUT: 50 mL / NET: -50 mL      I&O's Detail    2019 07:  -  2019 07:00  --------------------------------------------------------  IN:    IV PiggyBack: 250 mL    Oral Fluid: 125 mL  Total IN: 375 mL    OUT:    Voided: 300 mL  Total OUT: 300 mL    Total NET: 75 mL      2019 07:  -  2019 08:18  --------------------------------------------------------  IN:  Total IN: 0 mL    OUT:    Voided: 50 mL  Total OUT: 50 mL    Total NET: -50 mL          PHYSICAL EXAM:    General/Neuro  Deficits:  alert & oriented x 3, no focal deficits    Heent: + swollen lips with hoarse voice, uvula not visualized     Lungs: clear to auscultation, Normal expansion/effort.     Cardiovascular : S1, S2.  Regular rate and rhythm. + Peripheral edema   Cardiac Rhythm: Normal Sinus Rhythm    GI: Abdomen soft, Non-tender, Non-distended.      Extremities: Extremities warm, pink, well-perfused. Pulses:Rt     Lt    Derm: Good skin turgor, no skin breakdown.  +punctate petechiae noted on b/l arms    CXR:       LABS:  CAPILLARY BLOOD GLUCOSE      POCT Blood Glucose.: 135 mg/dL (2019 06:19)  POCT Blood Glucose.: 118 mg/dL (2019 01:56)  POCT Blood Glucose.: 126 mg/dL (2019 22:07)                          12.7   9.75  )-----------( 161      ( 2019 02:59 )             38.7       PT  12.00 ()      INR  1.04 ()      PTT  30.8 ()          138  |  103  |  13  ----------------------------<  154<H>  4.5   |  24  |  0.7    Ca    8.4<L>      2019 02:59  Phos  4.1       Mg     2.2             PT/INR - ( 2019 02:59 )   PT: 12.00 sec;   INR: 1.04 ratio         PTT - ( 2019 02:59 )  PTT:30.8 sec  CARDIAC MARKERS ( 2019 05:05 )  x     / <0.01 ng/mL / 136 U/L / x     / 2.2 ng/mL  CARDIAC MARKERS ( 2019 02:59 )  x     / <0.01 ng/mL / 148 U/L / x     / 2.1 ng/mL  CARDIAC MARKERS ( 2019 18:00 )  x     / <0.01 ng/mL / 196 U/L / x     / 3.5 ng/mL EL STEIN  [id number]  74y Female    Indication for ICU admission: Angioedema. Hemodynamic Monitoring     Admit Date:19  ICU Date: 4/3/19   OR Date: 4/3/19     No Known Allergies    PAST MEDICAL & SURGICAL HISTORY:  OA (osteoarthritis)  Breast cancer: LEFT  PVD (peripheral vascular disease)  HTN (hypertension)  Asthma: NEVER HAD ATTACK- ALLERGY INDUCED  H/O thyroidectomy:     Home Medications:  aspirin 81 mg oral tablet, chewable: 1 tab(s) orally once a day (2019 09:13)  calcitriol 0.5 mcg oral capsule: 1 cap(s) orally once a day (2019 09:13)  levothyroxine 150 mcg (0.15 mg) oral tablet: 1 tab(s) orally once a day (2019 09:13)  losartan-hydrochlorothiazide 50mg-12.5mg oral tablet: 1 tab(s) orally once a day (2019 09:13)  montelukast 10 mg oral tablet: 1 tab(s) orally once a day (2019 09:13)  Supartz 10 mg/mL intra-articular solution:  (2019 09:13)  Symbicort 160 mcg-4.5 mcg/inh inhalation aerosol: 2 puff(s) inhaled 2 times a day (2019 09:13)    73 y/o F with a PMHx of HLD, HTN, asthma, hypothyroidism and anxiety. Found to have L upper quadrant malignant neoplasm estrogen receptor positive, HER2 negative in the L breast on stereotactic core biopsy 19. Patient had no family history of breast/ovarian cancer. Today patient under went L breast lumpectomy w/ sentinel lymphnode biopsy. Patient was intubated, considered to be a difficult intubation by anesthesia. Sedated with general anesthesia. Hemodynamically stable throughout the case. While in the Ambulatory PACU. Anesthesia noted her lips and tongue to be swelling. At the time patient was tachycardic to 110's and 's. Anesthesia gave pepcid, solucortef, benedryl and nebulizer treatment. Patient had no improvement. SICU team was called to the bedside.     Patient was noted to not have any prior allergies. Patient has had surgery in the past and tolerated general anesthesia without any problems. Patient did not recieve lymphazurin blue during todays case. Patient does not take any ace inhibitors at home.     SICU team found patient to be hypertensive to 's, tachycardic to 110's and O2 saturation 91%. Patient was noted to have clear breath sounds, no evidence of stridor. 1 dose of epinephrine IM given stat, decadron 10 given stat and patient received racemic epi nebulizer treatment all in [facility]. Dr Marshall was at bedside with Anesthesia and determined that it was not emergent to intubate the patient. She would be transferred to SICU for close monitoring.     24HRS EVENT:    Neurologic: Monitor for changes in mental status. Patient has no pain currently. Restarted home ambien and escitalopram.     Respiratory: Placed patient on a face mask with end title CO2 monitoring. ABG. AM CXR. Hx of asthma restarted home symbicort and montelukast. Racemic Epi nebulizer treatment stat and then Q 3 hours. Sent tryptase for allergy testing. weaned to nasal cannula overnight with adequate saturations    Cardiovascular: Hx of HTN and HLD. Restarted home losartan, HCTZ, and atorvastatin. EKG NSR. 3 sets of cardiac enzymes negative    Gastrointestinal/Nutrition: NPO. IVL. Pepcid 10 stat.     Genitourinary/Renal: No cotton. Voiding. Monitor lytes replete as needed.     Hematologic: Restart home ASA. SCD in place. Monitor H/H     Infectious Disease: Afebrile. Trend WBC     Endocrine: Decadron 10 stat. Decadron 8 Q 6. FS Q 4 hours. Restarted Synthroyid     Disposition: SICU     Overnight: No acute events, given ice chips    DVT PTX: SCD     GI PTX: Pepcid     ***Tubes/Lines/Drains  ***  Peripheral IV      REVIEW OF SYSTEMS    [x ] A ten-point review of systems was otherwise negative except as noted.  [ ] Due to altered mental status/intubation, subjective information were not able to be obtained from the patient. History was obtained, to the extent possible, from review of the chart and collateral sources of information.    Daily     Daily Weight in k (2019 18:00)    Diet, NPO (19 @ 17:51)      CURRENT MEDS:  Neurologic Medications  escitalopram 10 milliGRAM(s) Oral <User Schedule>  ondansetron Injectable 4 milliGRAM(s) IV Push once PRN Nausea and/or Vomiting  zolpidem 5 milliGRAM(s) Oral <User Schedule> PRN Insomnia    Respiratory Medications  buDESOnide 160 MICROgram(s)/formoterol 4.5 MICROgram(s) Inhaler 2 Puff(s) Inhalation two times a day  montelukast 10 milliGRAM(s) Oral <User Schedule>  racepinephrine  2.25% Inhalation 0.5 milliLiter(s) Inhalation every 3 hours PRN sob    Cardiovascular Medications  hydrochlorothiazide 12.5 milliGRAM(s) Oral <User Schedule>  losartan 50 milliGRAM(s) Oral <User Schedule>    Gastrointestinal Medications  dextrose 5%. 1000 milliLiter(s) IV Continuous <Continuous>    Genitourinary Medications    Hematologic/Oncologic Medications  aspirin enteric coated 81 milliGRAM(s) Oral daily    Antimicrobial/Immunologic Medications    Endocrine/Metabolic Medications  atorvastatin 40 milliGRAM(s) Oral at bedtime  dexamethasone  IVPB 8 milliGRAM(s) IV Intermittent every 6 hours  glucagon  Injectable 1 milliGRAM(s) IntraMuscular once PRN Glucose LESS THAN 70 milligrams/deciliter    Topical/Other Medications  chlorhexidine 4% Liquid 1 Application(s) Topical two times a day      ICU Vital Signs Last 24 Hrs  T(C): 37.1 (2019 08:00), Max: 38.3 (2019 20:00)  T(F): 98.7 (2019 08:00), Max: 101 (2019 20:00)  HR: 70 (2019 08:00) (70 - 122)  BP: 124/67 (2019 08:00) (93/63 - 194/92)  BP(mean): 92 (2019 08:00) (75 - 120)  ABP: --  ABP(mean): --  RR: 24 (2019 08:00) (10 - 28)  SpO2: 99% (2019 08:00) (92% - 100%)        ABG - ( 2019 18:02 )  pH, Arterial: 7.38  pH, Blood: x     /  pCO2: 49    /  pO2: 88    / HCO3: 29    / Base Excess: 2.6   /  SaO2: 97              I&O's Summary    2019 07:01  -  2019 07:00  --------------------------------------------------------  IN: 375 mL / OUT: 300 mL / NET: 75 mL    2019 07:  -  2019 08:18  --------------------------------------------------------  IN: 0 mL / OUT: 50 mL / NET: -50 mL      I&O's Detail    2019 07:  -  2019 07:00  --------------------------------------------------------  IN:    IV PiggyBack: 250 mL    Oral Fluid: 125 mL  Total IN: 375 mL    OUT:    Voided: 300 mL  Total OUT: 300 mL    Total NET: 75 mL      2019 07:  -  2019 08:18  --------------------------------------------------------  IN:  Total IN: 0 mL    OUT:    Voided: 50 mL  Total OUT: 50 mL    Total NET: -50 mL          PHYSICAL EXAM:    General/Neuro  Deficits:  alert & oriented x 3, no focal deficits    Heent: + swollen lips with hoarse voice, uvula not visualized     Lungs: clear to auscultation, Normal expansion/effort.     Cardiovascular : S1, S2.  Regular rate and rhythm. + Peripheral edema   Cardiac Rhythm: Normal Sinus Rhythm    GI: Abdomen soft, Non-tender, Non-distended.      Extremities: Extremities warm, pink, well-perfused. Pulses:Rt     Lt    Derm: Good skin turgor, no skin breakdown.  +punctate petechiae noted on b/l arms    CXR:       LABS:  CAPILLARY BLOOD GLUCOSE      POCT Blood Glucose.: 135 mg/dL (2019 06:19)  POCT Blood Glucose.: 118 mg/dL (2019 01:56)  POCT Blood Glucose.: 126 mg/dL (2019 22:07)                          12.7   9.75  )-----------( 161      ( 2019 02:59 )             38.7       PT  12.00 ()      INR  1.04 ()      PTT  30.8 ()          138  |  103  |  13  ----------------------------<  154<H>  4.5   |  24  |  0.7    Ca    8.4<L>      2019 02:59  Phos  4.1       Mg     2.2             PT/INR - ( 2019 02:59 )   PT: 12.00 sec;   INR: 1.04 ratio         PTT - ( 2019 02:59 )  PTT:30.8 sec  CARDIAC MARKERS ( 2019 05:05 )  x     / <0.01 ng/mL / 136 U/L / x     / 2.2 ng/mL  CARDIAC MARKERS ( 2019 02:59 )  x     / <0.01 ng/mL / 148 U/L / x     / 2.1 ng/mL  CARDIAC MARKERS ( 2019 18:00 )  x     / <0.01 ng/mL / 196 U/L / x     / 3.5 ng/mL

## 2019-04-04 NOTE — PROGRESS NOTE ADULT - ASSESSMENT
74F s/p left breast lumpectomy x2 admitted after anaphylactic reaction in recovery     Plan:  Possible downgrade vs. discharge today if no other reactions   continue oxygen as needed  encourage ambulation   emergency airway at bedside

## 2019-04-04 NOTE — DISCHARGE NOTE NURSING/CASE MANAGEMENT/SOCIAL WORK - NSDCDPATPORTLINK_GEN_ALL_CORE
You can access the [facility] Patient Portal, offered by [facility], by registering with the following website: [email or web address]

## 2019-04-04 NOTE — DISCHARGE NOTE NURSING/CASE MANAGEMENT/SOCIAL WORK - NSDCPEWEB_GEN_ALL_CORE
[place] website --- [email or web address] website --- [email or web address]

## 2019-04-04 NOTE — PROVIDER CONTACT NOTE (OTHER) - SITUATION
Pt had drop in CO2 monitoring, no distress, not desaturating,  bedside to assess, will con't to monitor.

## 2019-04-05 ENCOUNTER — TRANSCRIPTION ENCOUNTER (OUTPATIENT)
Age: 74
End: 2019-04-05

## 2019-04-05 VITALS — DIASTOLIC BLOOD PRESSURE: 78 MMHG | HEART RATE: 89 BPM | SYSTOLIC BLOOD PRESSURE: 139 MMHG | OXYGEN SATURATION: 98 %

## 2019-04-05 LAB
ANION GAP SERPL CALC-SCNC: 12 MMOL/L — SIGNIFICANT CHANGE UP (ref 7–14)
APTT BLD: 30.4 SEC — SIGNIFICANT CHANGE UP (ref 27–39.2)
BUN SERPL-MCNC: 18 MG/DL — SIGNIFICANT CHANGE UP (ref 10–20)
CALCIUM SERPL-MCNC: 8.5 MG/DL — SIGNIFICANT CHANGE UP (ref 8.5–10.1)
CHLORIDE SERPL-SCNC: 102 MMOL/L — SIGNIFICANT CHANGE UP (ref 98–110)
CO2 SERPL-SCNC: 27 MMOL/L — SIGNIFICANT CHANGE UP (ref 17–32)
CREAT SERPL-MCNC: 0.7 MG/DL — SIGNIFICANT CHANGE UP (ref 0.7–1.5)
GLUCOSE BLDC GLUCOMTR-MCNC: 121 MG/DL — HIGH (ref 70–99)
GLUCOSE BLDC GLUCOMTR-MCNC: 146 MG/DL — HIGH (ref 70–99)
GLUCOSE SERPL-MCNC: 149 MG/DL — HIGH (ref 70–99)
HCT VFR BLD CALC: 36.3 % — LOW (ref 37–47)
HGB BLD-MCNC: 11.5 G/DL — LOW (ref 12–16)
INR BLD: 0.96 RATIO — SIGNIFICANT CHANGE UP (ref 0.65–1.3)
MAGNESIUM SERPL-MCNC: 2.4 MG/DL — SIGNIFICANT CHANGE UP (ref 1.8–2.4)
MCHC RBC-ENTMCNC: 29.6 PG — SIGNIFICANT CHANGE UP (ref 27–31)
MCHC RBC-ENTMCNC: 31.7 G/DL — LOW (ref 32–37)
MCV RBC AUTO: 93.6 FL — SIGNIFICANT CHANGE UP (ref 81–99)
NRBC # BLD: 0 /100 WBCS — SIGNIFICANT CHANGE UP (ref 0–0)
PHOSPHATE SERPL-MCNC: 3.4 MG/DL — SIGNIFICANT CHANGE UP (ref 2.1–4.9)
PLATELET # BLD AUTO: 141 K/UL — SIGNIFICANT CHANGE UP (ref 130–400)
POTASSIUM SERPL-MCNC: 4.2 MMOL/L — SIGNIFICANT CHANGE UP (ref 3.5–5)
POTASSIUM SERPL-SCNC: 4.2 MMOL/L — SIGNIFICANT CHANGE UP (ref 3.5–5)
PROTHROM AB SERPL-ACNC: 11.1 SEC — SIGNIFICANT CHANGE UP (ref 9.95–12.87)
RBC # BLD: 3.88 M/UL — LOW (ref 4.2–5.4)
RBC # FLD: 12.4 % — SIGNIFICANT CHANGE UP (ref 11.5–14.5)
SODIUM SERPL-SCNC: 141 MMOL/L — SIGNIFICANT CHANGE UP (ref 135–146)
TRYPTASE SERPL-MCNC: 3.3 NG/ML — SIGNIFICANT CHANGE UP
WBC # BLD: 8.08 K/UL — SIGNIFICANT CHANGE UP (ref 4.8–10.8)
WBC # FLD AUTO: 8.08 K/UL — SIGNIFICANT CHANGE UP (ref 4.8–10.8)

## 2019-04-05 RX ORDER — EPINEPHRINE 0.3 MG/.3ML
0.3 INJECTION INTRAMUSCULAR; SUBCUTANEOUS
Qty: 0.3 | Refills: 0 | OUTPATIENT
Start: 2019-04-05

## 2019-04-05 RX ORDER — EPINEPHRINE 0.3 MG/.3ML
0.1 INJECTION INTRAMUSCULAR; SUBCUTANEOUS
Qty: 0.1 | Refills: 0 | OUTPATIENT
Start: 2019-04-05

## 2019-04-05 RX ORDER — EPINEPHRINE 0.3 MG/.3ML
0.3 INJECTION INTRAMUSCULAR; SUBCUTANEOUS
Qty: 0.3 | Refills: 0
Start: 2019-04-05

## 2019-04-05 RX ADMIN — LOSARTAN POTASSIUM 50 MILLIGRAM(S): 100 TABLET, FILM COATED ORAL at 09:32

## 2019-04-05 RX ADMIN — BUDESONIDE AND FORMOTEROL FUMARATE DIHYDRATE 2 PUFF(S): 160; 4.5 AEROSOL RESPIRATORY (INHALATION) at 07:57

## 2019-04-05 RX ADMIN — Medication 12.5 MILLIGRAM(S): at 09:32

## 2019-04-05 RX ADMIN — MONTELUKAST 10 MILLIGRAM(S): 4 TABLET, CHEWABLE ORAL at 09:32

## 2019-04-05 RX ADMIN — Medication 81 MILLIGRAM(S): at 12:28

## 2019-04-05 RX ADMIN — Medication 150 MICROGRAM(S): at 05:19

## 2019-04-05 RX ADMIN — HEPARIN SODIUM 5000 UNIT(S): 5000 INJECTION INTRAVENOUS; SUBCUTANEOUS at 05:19

## 2019-04-05 NOTE — DISCHARGE NOTE PROVIDER - NSDCFUADDAPPT_GEN_ALL_CORE_FT
please follow with Dr. Stewart in 1 week post-operatively.  Please followup with Dr. Merchant promptly upon discharge, as soon as possible given your allergic reaction that occurred.  You MUST pickup your prescription for Epinephrine pen in pharmacy immediately upon leaving the hospital  If you notice change in your voice, difficulty breathing, hives, shortness of breath, or swelling of face and lips, you must inject the epinephrine pen and call 911.  Again, it is imperative that you followup with an Immunologist as soon as possible, you have been given contact information to make your appointment.

## 2019-04-05 NOTE — PROGRESS NOTE ADULT - SUBJECTIVE AND OBJECTIVE BOX
Progress Note: Surgery  Patient: EL STEIN , 74y (1945)Female   MRN: [id number]  Location: [address]   Visit: 04-03-19 Inpatient  Date: 04-05-19 @ 05:45    Hospital Day: 3    Post-op Day: 2    Procedure/Injury: s/p left breast lumpectomy x2 with anaphylactic reaction to unknown substance in recovery      Events over 24h: patient doing well, no additional reactions, saturating well, ambulating, afebrile overnight     Vitals: T(F): 98.2 (04-04-19 @ 23:15), Max: 99.1 (04-04-19 @ 12:04)  HR: 80 (04-04-19 @ 23:15)  BP: 108/64 (04-04-19 @ 23:15) (93/63 - 142/82)  RR: 18 (04-04-19 @ 23:15)  SpO2: 98% (04-04-19 @ 18:03)    Diet: Diet, Dysphagia 1 Pureed-Nectar Consistency Fluid (04-04-19 @ 09:42)    Bowel function:   Bowel movement [x]   Flatus [x]    Out:   04-03-19 @ 07:01  -  04-04-19 @ 07:00  --------------------------------------------------------  OUT:    Voided: 300 mL  Total OUT: 300 mL      04-04-19 @ 07:01  -  04-05-19 @ 05:45  --------------------------------------------------------  OUT:    Voided: 950 mL  Total OUT: 950 mL    Physical Examination:  General: NAD, lying in bed comfortably   HEENT: NCAT, CAROLIN, WNL  Heart: S1 S2, No MRG RRR   Lungs: CTABL +BS Equal BL, No WRC  Abdomen: Soft, non-distended, nontender. Obese. No guarding or rebound tenderness.   Incisions/Wounds: Dressings in place, clean, dry and intact, no signs of infection/active bleeding/drainage    Medications: [Standing]  aspirin enteric coated 81 milliGRAM(s) Oral daily  atorvastatin 40 milliGRAM(s) Oral at bedtime  buDESOnide 160 MICROgram(s)/formoterol 4.5 MICROgram(s) Inhaler 2 Puff(s) Inhalation two times a day  chlorhexidine 4% Liquid 1 Application(s) Topical <User Schedule>  dextrose 5%. 1000 milliLiter(s) (50 mL/Hr) IV Continuous <Continuous>  escitalopram 10 milliGRAM(s) Oral <User Schedule>  heparin  Injectable 5000 Unit(s) SubCutaneous every 8 hours  hydrochlorothiazide 12.5 milliGRAM(s) Oral <User Schedule>  levothyroxine 150 MICROGram(s) Oral daily  losartan 50 milliGRAM(s) Oral <User Schedule>  montelukast 10 milliGRAM(s) Oral <User Schedule>    Medications:[PRN]  ondansetron Injectable 4 milliGRAM(s) IV Push once PRN  racepinephrine  2.25% Inhalation 0.5 milliLiter(s) Inhalation every 3 hours PRN  zolpidem 5 milliGRAM(s) Oral <User Schedule> PRN    Labs:                        11.5   8.08  )-----------( 141      ( 05 Apr 2019 01:29 )             36.3     04-05    141  |  102  |  18  ----------------------------<  149<H>  4.2   |  27  |  0.7    Ca    8.5      05 Apr 2019 01:29  Phos  3.4     04-05  Mg     2.4     04-05      PT/INR - ( 05 Apr 2019 01:29 )   PT: 11.10 sec;   INR: 0.96 ratio    PTT - ( 05 Apr 2019 01:29 )  PTT:30.4 sec  ABG - ( 03 Apr 2019 18:02 )  pH: 7.38  /  pCO2: 49    /  pO2: 88    / HCO3: 29    / Base Excess: 2.6   /  SaO2: 97          CARDIAC MARKERS ( 04 Apr 2019 05:05 )  x     / <0.01 ng/mL / 136 U/L / x     / 2.2 ng/mL  CARDIAC MARKERS ( 04 Apr 2019 02:59 )  x     / <0.01 ng/mL / 148 U/L / x     / 2.1 ng/mL  CARDIAC MARKERS ( 03 Apr 2019 18:00 )  x     / <0.01 ng/mL / 196 U/L / x     / 3.5 ng/mL    Date/Time: 04-05-19 @ 05:45

## 2019-04-05 NOTE — DISCHARGE NOTE PROVIDER - NSDCCPCAREPLAN_GEN_ALL_CORE_FT
PRINCIPAL DISCHARGE DIAGNOSIS  Diagnosis: Invasive ductal carcinoma of breast  Assessment and Plan of Treatment:

## 2019-04-05 NOTE — DISCHARGE NOTE PROVIDER - HOSPITAL COURSE
Patient is a 74 year old female s/p left breast lumpectomy 2 needle-localized masses for invasive ductal CA ER+ KY+ HER 2 -.  Patient had left sentinel LN bx which was negative.  Patient tolerated procedure well, however post-operatively patient was noted to have suspected anaphylaxis, was treated appropriately and monitor in ICU overnight.  Notable facial swelling and stridor were prominent symptoms in PACU.  Patient was treated with IV steroids and additional indicated treatment.  Facial and airway edema resolved based on exam.  Patient was downgraded after stable.  Patient suitable for discharge home and will require prompt followup with immunologist and will be sent home with epipen with adequate teaching prior to discharge. Patient is a 74 year old female s/p left breast lumpectomy 2 needle-localized masses for invasive ductal CA ER+ ID+ HER 2 -.  Patient had left sentinel LN bx which was negative.  Patient tolerated procedure well, however post-operatively patient was noted to have suspected anaphylaxis, was treated appropriately and monitor in ICU overnight.  Notable facial swelling and stridor were prominent symptoms in PACU.  Patient was treated with IV steroids and additional indicated treatment.  Facial and airway edema resolved based on exam.  Patient was downgraded after stable.   Patient suitable for discharge home and will require prompt followup with immunologist and will be sent home with \[facility]\""pen with adequate Patient is a 74 year old female s/p left breast lumpectomy 2 needle-localized masses for invasive ductal CA ER+ AR+ HER 2 -.  Patient had left sentinel LN bx which was negative.  Patient tolerated procedure well, however post-operatively patient was noted to have suspected anaphylaxis, was treated appropriately and monitor in ICU overnight.  Notable facial swelling and stridor were prominent symptoms in PACU.  Patient was treated with IV steroids and additional indicated treatment.  Facial and airway edema resolved based on exam.  Patient was downgraded after stable.   Patient suitable for discharge home and will require prompt followup with immunologist and [facility]

## 2019-04-05 NOTE — DISCHARGE NOTE PROVIDER - CARE PROVIDERS DIRECT ADDRESSES
[email or web address]

## 2019-04-05 NOTE — PROGRESS NOTE ADULT - ASSESSMENT
74F s/p left breast lumpectomy x2 admitted after anaphylactic reaction in recovery     Plan:  Possible discharge today if no other reactions   encourage ambulation

## 2019-04-05 NOTE — DISCHARGE NOTE PROVIDER - CARE PROVIDER_API CALL
Bebeto Stewart)  Surgery  [address] [facility], [address]nd [place] [address]  Phone: [phone]  Fax: [phone]  Follow Up Time:     Matilde Merchant)  Allergy and Immunology; Pediatrics  [address] [place] [address], [address]  [place] [address]  Phone: [phone]  Fax: [phone]  Follow Up Time:

## 2019-04-08 LAB — SURGICAL PATHOLOGY STUDY: SIGNIFICANT CHANGE UP

## 2019-04-11 ENCOUNTER — APPOINTMENT (OUTPATIENT)
Dept: BREAST CENTER | Facility: CLINIC | Age: 74
End: 2019-04-11
Payer: COMMERCIAL

## 2019-04-16 ENCOUNTER — APPOINTMENT (OUTPATIENT)
Dept: BREAST CENTER | Facility: CLINIC | Age: 74
End: 2019-04-16
Payer: COMMERCIAL

## 2019-04-16 VITALS
SYSTOLIC BLOOD PRESSURE: 122 MMHG | BODY MASS INDEX: 45.64 KG/M2 | DIASTOLIC BLOOD PRESSURE: 80 MMHG | TEMPERATURE: 98.2 F | HEIGHT: 66 IN | WEIGHT: 284 LBS

## 2019-04-16 PROCEDURE — 99024 POSTOP FOLLOW-UP VISIT: CPT

## 2019-04-19 DIAGNOSIS — I73.9 PERIPHERAL VASCULAR DISEASE, UNSPECIFIED: ICD-10-CM

## 2019-04-19 DIAGNOSIS — R00.0 TACHYCARDIA, UNSPECIFIED: ICD-10-CM

## 2019-04-19 DIAGNOSIS — T88.6XXA ANAPHYLACTIC REACTION DUE TO ADVERSE EFFECT OF CORRECT DRUG OR MEDICAMENT PROPERLY ADMINISTERED, INITIAL ENCOUNTER: ICD-10-CM
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
## 2019-04-19 DIAGNOSIS — C50.412 MALIGNANT NEOPLASM OF UPPER-OUTER QUADRANT OF LEFT FEMALE BREAST: ICD-10-CM

## 2019-04-19 DIAGNOSIS — I10 ESSENTIAL (PRIMARY) HYPERTENSION: ICD-10-CM

## 2019-04-19 DIAGNOSIS — E89.0 POSTPROCEDURAL HYPOTHYROIDISM: ICD-10-CM

## 2019-04-19 DIAGNOSIS — T50.905A ADVERSE EFFECT OF UNSPECIFIED DRUGS, MEDICAMENTS AND BIOLOGICAL SUBSTANCES, INITIAL ENCOUNTER: ICD-10-CM

## 2019-04-19 DIAGNOSIS — N99.89 OTHER POSTPROCEDURAL COMPLICATIONS AND DISORDERS OF GENITOURINARY SYSTEM: ICD-10-CM

## 2019-04-19 DIAGNOSIS — H90.3 SENSORINEURAL HEARING LOSS, BILATERAL: ICD-10-CM
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
## 2019-04-19 DIAGNOSIS — J45.909 UNSPECIFIED ASTHMA, UNCOMPLICATED: ICD-10-CM

## 2019-04-19 DIAGNOSIS — M19.90 UNSPECIFIED OSTEOARTHRITIS, UNSPECIFIED SITE: ICD-10-CM

## 2019-04-22 NOTE — HISTORY OF PRESENT ILLNESS
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 \par \par Right benign breast tissue with proliferative type fibrocystic changes on MR guided core biopsy 3/7/19; Central, 7 mm (cork).  \par \par \par Status post Left NLOC and Left SLNB 4/3/19 demonstrating 0/2 (-) SLNB; LUOQ NLOC two healing prior biopsy sites 20 mm with invasive well differentiated IDC and non extensive DCIS solid and cribiform types with comedo necrosis and calcifications low to intermediate nuclear grade; no LVI is seen; with negative margins.  AJCC 8th Edition Pathologic Stage: pT1c, p(sn)N0, pMx.

## 2019-04-22 NOTE — PAST MEDICAL HISTORY
[FreeTextEntry5] : Unilateral oophorectomy due to fibroids 1988. [History of Hormone Replacement Treatment] : has no history of hormone replacement treatment [FreeTextEntry6] : none [FreeTextEntry8] : none [FreeTextEntry7] : OCP for 0-5 years; discontinued in 1988.

## 2019-04-22 NOTE — ASSESSMENT
[FreeTextEntry1] : Tameka is s/p left breast lumpectomy and SNB. Healing well.  Pathology reviewed with the patient.\par \par We will send an Oncotype DX.  She is referred to Dr. Flores, medical oncology.  She is referred to Radiation Oncology.\par f/up in 4 weeks.   She is awaiting an apointment for an immunologist to be evaluated for her anaphylactic response at surgery.\par

## 2019-04-22 NOTE — DATA REVIEWED
[FreeTextEntry1] : Surgical Final Report\par Final Diagnosis\par 1. Breast, left axillary sentinel lymph node #1, biopsy:\par - One benign lymph node (0/1). Negative for carcinoma with\par evaluation of multiple H&E levels and with negative\par immunohistochemical stains for cytokeratins (CK AE1/AE3 and CK\par 8/18).\par 2. Breast, left axillary sentine lymph node #2, biopsy:\par - One benign lymph node (0/1). Negative for carcinoma with\par evaluation of multiple H&E levels and with negative\par immunohistochemical stains for cytokeratins (CK AE1/AE3 and CK\par 8/18).\par 3. 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 The invasive tumor is located 1.0 mm, 1.0 mm, and 2.0\par mm from the closest lateral, inferior, and superior surgical\par margins respectively. The intraductal tumor is located 1.0 mm\par from the closest medial surgical margin (microscopic\par measurements).\par - Surrounding atrophic breast tissue with proliferative type\par fibrocystic changes associated with microcalcifications, a small\par duct papilloma, and a separate radical sclerosing lesion (radial\par scar).\par - Pending special studies for HER2/ALVERTO oncoprotein expression\par and/or gene amplification, with results to be reported as a\par separate addendum.\par - AJCC 8th Edition Pathologic Stage: pT1c, p(sn)N0, pMx.\par 4. Breast, left inferior margin, excision:\par - Benign atrophic breast tissue with proliferative type\par fibrocystic changes associated with microcalcifications, a small\par hyalinized fibrodadenoma, and a radial sclerosing lesion (radial\par scar).  Negative for carcinoma.\par 5. Breast, left lateral margin, excision:\par - Focus of invasive well differentiated ductal carcinoma, 1.5 mm,\par located 0.8 mm from the nearest new inked margin (microscopic\par measurement).\par 6. Breast, left medial margin, excision:\par - Benign atrophic breast tissue with proliferative type\par fibrocystic changes associated with microcalcifications. Negative\par for carcinoma.\par 7. Breast, left anterior margin, excision:\par - Benign atrophic breast tissue with proliferative type\par fibrocystic changes assocaited with microcalcifications and a\par small radial sclerosing lesion (radial scar). Negative for\par carcinoma.\par

## 2019-04-22 NOTE — CONSULT LETTER
[Dear  ___] : Dear  [unfilled], [Please see my note below.] : Please see my note below. [Sincerely,] : Sincerely, [FreeTextEntry2] : Odessa Lo M.D.\par [address]\[place] [address] [FreeTextEntry1] : This letter is in regard to our mutual patient who underwent breast lumpectomy with needle localization and sentinel lymph node mapping and biopsy.  Enclosed is a copy of her pathology results.\par \par We will continue to take excellent care of your patient.\par \par Please feel free to contact our office with any questions or concerns.  [FreeTextEntry3] : Bebeto Stewart M.D., F.A.C.S.

## 2019-04-23 NOTE — PATIENT PROFILE ADULT - TOBACCO USE
Abeba is a 67 year old  female who presents for annual exam.     HPI: Besides routine health maintenance,  she would like the second dose of shingles vaccine and medication refills for simvastatin and clonidine. Additionally, patient is concerned about uterine prolapse.  Denies any urinary or fecal incontinence. Patient reports that she has had the prolapse for > 30 yrs, however, it has worsened over time. States that prolapse surgery has been recommended; however, she is hesitant, as she has heard that it may lead to urinary and fecal incontinence.  Menses are absent.  No LMP recorded. Patient is postmenopausal. x 15 yrs    REPRODUCTIVE/GYNECOLOGIC HISTORY:  Abeba is not sexually active and is currently in monogamous relationship.   STI testing offered?  Declined  History of abnormal Pap smear:  No  SOCIAL HISTORY  Abuse: does not report having previously been physical or sexually abused.    Do you feel safe in your environment? YES    She  reports that she has never smoked. She has never used smokeless tobacco.    PHQ-2 Score:     PHQ-2 (  [employer]) 2019   Q1: Little interest or pleasure in doing things 0 0   Q2: Feeling down, depressed or hopeless 0 0   PHQ-2 Score 0 0     Alcohol Score = consumes ETOH 2x/yr; drinks 1-2 drinks/occasion    HEALTH MAINTENANCE:  Cholesterol: (  Cholesterol   Date Value Ref Range Status   2018 177 <200 mg/dL Final   2017 190 <200 mg/dL Final    History of abnormal lipids: Yes; last abnormal result > 10 yrs; has not had an abnormal result since using simvistatin  Mammo: . History of abnormal Mammo: YES, in 2016; had breast bx, WNL.  Regular Self Breast Exams: Yes  TSH: (  TSH   Date Value Ref Range Status   2015 1.03 0.40 - 4.00 mU/L Final     Comment:     Effective 2014, the reference range for this assay has changed to reflect   new instrumentation/methodology.      )  Pap; (  Lab Results   Component Value Date    PAP UNSAT 2018     PAP NIL 2008    )  Immunizations up to date: no; needs second dose varicella   (Gardasil, Tdap, Flu)  Health maintenance updated:  yes    HEALTHY HABITS  Eating habits: eats regular meals, follows a balanced nutrition diet, has inadequate calcium intake, takes calcium supplements and takes daily vitamins  Calcium/Vitamin D intake: source:  dairy, dietary supplement(s) Adequate? Yes  Exercise: How often do you exercise? 5-7 times/week;Walking  Have you had an eye exam in the last two years? YES  Do you routinely see the dentist once or twice yearly? YES      HISTORY:  OB History    Para Term  AB Living   2 2 2 0 0 2   SAB TAB Ectopic Multiple Live Births   0 0 0 0 0      # Outcome Date GA Lbr Satnam/2nd Weight Sex Delivery Anes PTL Lv   2 Term            1 Term              Past Medical History:   Diagnosis Date     Hyperlipidemia LDL goal <160      Symptomatic menopausal or female climacteric states     HRT for 5 years; clonidine     Uterine prolapse      Past Surgical History:   Procedure Laterality Date     HERNIA REPAIR, UMBILICAL       SONO GUIDE NEEDLE BIOPSY      Rt breast lump, benign     TONSILLECTOMY       Family History   Problem Relation Age of Onset     Heart Disease Mother         MI at age 83 yrs old     C.A.D. Father          of MI at age 47 yrs. smoker      Lipids Father      Breast Cancer No family hx of      Social History     Socioeconomic History     Marital status:      Spouse name: None     Number of children: None     Years of education: None     Highest education level: None   Occupational History     None   Social Needs     Financial resource strain: None     Food insecurity:     Worry: None     Inability: None     Transportation needs:     Medical: None     Non-medical: None   Tobacco Use     Smoking status: Never Smoker     Smokeless tobacco: Never Used   Substance and Sexual Activity     Alcohol use: Yes     Comment: 1-2 GLASSES WINE MONTHLY     Drug  use: No     Sexual activity: Yes     Partners: Male   Lifestyle     Physical activity:     Days per week: None     Minutes per session: None     Stress: None   Relationships     Social connections:     Talks on phone: None     Gets together: None     Attends Christianity service: None     Active member of club or organization: None     Attends meetings of clubs or organizations: None     Relationship status: None     Intimate partner violence:     Fear of current or ex partner: None     Emotionally abused: None     Physically abused: None     Forced sexual activity: None   Other Topics Concern     Parent/sibling w/ CABG, MI or angioplasty before 65F 55M? 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       Current Outpatient Medications:      ASPIRIN 325 MG OR TBEC, 1 TABLET DAILY, Disp: , Rfl:      CALCIUM + D 600-200 MG-UNIT OR TABS, 2 TABLETS DAILY, Disp: , Rfl:      cloNIDine (CATAPRES) 0.1 MG tablet, TAKE 1/2 TABLET BY MOUTH TWICE DAILY, Disp: 90 tablet, Rfl: 1     GLUCOSAMINE SULFATE PO, , Disp: , Rfl:      GREEN TEA (CAMILLIA SINENSIS) 315 MG OR CAPS, 1 CAPSULE DAILY , Disp: , Rfl:      MULTI-VITAMIN OR TABS, 1 TABLET DAILY, Disp: , Rfl:      OMEGA 3 1000 MG OR CAPS, 1 CAPSULE DAILY, Disp: , Rfl:      simvastatin (ZOCOR) 20 MG tablet, Take 1 tablet (20 mg) by mouth At Bedtime, Disp: 90 tablet, Rfl: 3     FLUCELVAX QUADRIVALENT 0.5 ML TIMOTHY, , Disp: , Rfl: 0     Allergies   Allergen Reactions     Penicillins Rash       Past medical, surgical, social and family history were reviewed and updated in EPIC.    ROS:   CONSTITUTIONAL: NEGATIVE for fever, chills, change in weight  INTEGUMENTARY/SKIN: NEGATIVE for worrisome rashes, moles or lesions  EYES: NEGATIVE for vision changes or irritation  ENT: NEGATIVE for ear, mouth and throat problems  RESP: NEGATIVE for significant cough or SOB  BREAST: NEGATIVE for masses, tenderness or discharge  CV: NEGATIVE for chest pain, palpitations or  peripheral edema  GI: NEGATIVE for nausea, abdominal pain, heartburn, or change in bowel habits  : NEGATIVE for unusual urinary or vaginal symptoms. No vaginal bleeding.  MUSCULOSKELETAL: NEGATIVE for significant arthralgias or myalgia; Positive for occasional joint pain, which moves around to different joints, states tolerable  NEURO: NEGATIVE for weakness, dizziness or paresthesias  PSYCHIATRIC: NEGATIVE for changes in mood or affect     PHYSICAL EXAM:  /76 (BP Location: Right arm, Cuff Size: Adult Regular)   Wt 79.8 kg (176 lb)   BMI 31.68 kg/m     BMI: Body mass index is 31.68 kg/m .  Constitutional: healthy, alert and no distress  Neck: symmetrical, thyroid normal size, no masses present, no lymphadenopathy present.   Cardiovascular: RRR, no murmurs present  Respiratory: breathing unlabored, lungs CTA bilaterally  Breast:normal without masses, tenderness or nipple discharge and no palpable axillary masses or adenopathy  Gastrointestinal: abdomen soft, non-tender, normoactive bowel sounds present, well healed scar from hernia surgery    PELVIC EXAM:  Vulva: No lesions, no adenopathy, BUS WNL  Vagina: Moist, pink, discharge normal well rugated, no lesions  Cervix: smooth, pink, no visible lesions, cervical os visible at vaginal introitus   Uterus: Normal size, non-tender, mobile; stage 3 uterine prolapse, no rectocele, no cystocele  Ovaries: No masses palpated  Rectal exam: deferred    ASSESSMENT/PLAN:    ICD-10-CM    1. Women's annual routine gynecological examination Z01.419    2. Encounter for lipid screening for cardiovascular disease Z13.220 Lipid panel reflex to direct LDL Fasting    Z13.6    3. Screening for diabetes mellitus Z13.1 Glucose   4. Screening breast examination Z12.31 *MA Screening Digital Bilateral   5. Screening for osteoporosis Z13.820 DX Hip/Pelvis/Spine   6. Symptomatic menopausal or female climacteric states N95.1 cloNIDine (CATAPRES) 0.1 MG tablet   7. Hyperlipidemia LDL goal  <160 E78.5 simvastatin (ZOCOR) 20 MG tablet   8. Need for zoster vaccine Z23 ZOSTER VACCINE RECOMBINANT ADJUVANTED IM NJX     ADMIN 1st VACCINE       Discussed with patient that if uterine prolapse is not causing any symptoms or bothering her, then no treatment is needed; however, patient may see [facility] if she is interested in a pessary fitting or discussing surgical options; phone number was given. Preventative screening ordered this visit including Dexa scan, mammogram, and serum lipid panel and diabetes screen.  Did not discuss colon cancer screening this visit; will call patient and discuss options for screening.     COUNSELING:   Reviewed preventive health counseling, as reflected in patient instructions       Regular exercise       Healthy diet/nutrition       Osteoporosis Prevention/Bone Health   reports that she has never smoked. She has never used smokeless tobacco.    DEZ Jo, CNM       Former smoker

## 2019-04-26 ENCOUNTER — INPATIENT (INPATIENT)
Facility: HOSPITAL | Age: 74
LOS: 5 days | Discharge: NURSING, REHAB OR LONG-TERM CARE | End: 2019-05-02
Attending: INTERNAL MEDICINE | Admitting: INTERNAL MEDICINE
Payer: MEDICARE

## 2019-04-26 VITALS
HEART RATE: 93 BPM | DIASTOLIC BLOOD PRESSURE: 92 MMHG | OXYGEN SATURATION: 96 % | RESPIRATION RATE: 20 BRPM | TEMPERATURE: 98 F | SYSTOLIC BLOOD PRESSURE: 158 MMHG

## 2019-04-26 DIAGNOSIS — Z98.890 OTHER SPECIFIED POSTPROCEDURAL STATES: Chronic | ICD-10-CM

## 2019-04-26 LAB
ALBUMIN SERPL ELPH-MCNC: 4 G/DL — SIGNIFICANT CHANGE UP (ref 3.5–5.2)
ALP SERPL-CCNC: 107 U/L — SIGNIFICANT CHANGE UP (ref 30–115)
ALT FLD-CCNC: 14 U/L — SIGNIFICANT CHANGE UP (ref 0–41)
ANION GAP SERPL CALC-SCNC: 13 MMOL/L — SIGNIFICANT CHANGE UP (ref 7–14)
APTT BLD: 29.9 SEC — SIGNIFICANT CHANGE UP (ref 27–39.2)
AST SERPL-CCNC: 19 U/L — SIGNIFICANT CHANGE UP (ref 0–41)
BASOPHILS # BLD AUTO: 0.03 K/UL — SIGNIFICANT CHANGE UP (ref 0–0.2)
BASOPHILS NFR BLD AUTO: 0.4 % — SIGNIFICANT CHANGE UP (ref 0–1)
BILIRUB DIRECT SERPL-MCNC: <0.2 MG/DL — SIGNIFICANT CHANGE UP (ref 0–0.2)
BILIRUB INDIRECT FLD-MCNC: >0.1 MG/DL — LOW (ref 0.2–1.2)
BILIRUB SERPL-MCNC: 0.3 MG/DL — SIGNIFICANT CHANGE UP (ref 0.2–1.2)
BUN SERPL-MCNC: 9 MG/DL — LOW (ref 10–20)
CALCIUM SERPL-MCNC: 9 MG/DL — SIGNIFICANT CHANGE UP (ref 8.5–10.1)
CHLORIDE SERPL-SCNC: 104 MMOL/L — SIGNIFICANT CHANGE UP (ref 98–110)
CK SERPL-CCNC: 92 U/L — SIGNIFICANT CHANGE UP (ref 0–225)
CO2 SERPL-SCNC: 25 MMOL/L — SIGNIFICANT CHANGE UP (ref 17–32)
CREAT SERPL-MCNC: 0.8 MG/DL — SIGNIFICANT CHANGE UP (ref 0.7–1.5)
D DIMER BLD IA.RAPID-MCNC: 898 NG/ML DDU — HIGH (ref 0–230)
EOSINOPHIL # BLD AUTO: 0.01 K/UL — SIGNIFICANT CHANGE UP (ref 0–0.7)
EOSINOPHIL NFR BLD AUTO: 0.1 % — SIGNIFICANT CHANGE UP (ref 0–8)
GLUCOSE SERPL-MCNC: 136 MG/DL — HIGH (ref 70–99)
HCT VFR BLD CALC: 38.1 % — SIGNIFICANT CHANGE UP (ref 37–47)
HGB BLD-MCNC: 12.2 G/DL — SIGNIFICANT CHANGE UP (ref 12–16)
IMM GRANULOCYTES NFR BLD AUTO: 0.4 % — HIGH (ref 0.1–0.3)
INR BLD: 0.96 RATIO — SIGNIFICANT CHANGE UP (ref 0.65–1.3)
LACTATE SERPL-SCNC: 2.2 MMOL/L — SIGNIFICANT CHANGE UP (ref 0.5–2.2)
LYMPHOCYTES # BLD AUTO: 0.63 K/UL — LOW (ref 1.2–3.4)
LYMPHOCYTES # BLD AUTO: 8.3 % — LOW (ref 20.5–51.1)
MCHC RBC-ENTMCNC: 29.4 PG — SIGNIFICANT CHANGE UP (ref 27–31)
MCHC RBC-ENTMCNC: 32 G/DL — SIGNIFICANT CHANGE UP (ref 32–37)
MCV RBC AUTO: 91.8 FL — SIGNIFICANT CHANGE UP (ref 81–99)
MONOCYTES # BLD AUTO: 0.23 K/UL — SIGNIFICANT CHANGE UP (ref 0.1–0.6)
MONOCYTES NFR BLD AUTO: 3 % — SIGNIFICANT CHANGE UP (ref 1.7–9.3)
NEUTROPHILS # BLD AUTO: 6.66 K/UL — HIGH (ref 1.4–6.5)
NEUTROPHILS NFR BLD AUTO: 87.8 % — HIGH (ref 42.2–75.2)
NRBC # BLD: 0 /100 WBCS — SIGNIFICANT CHANGE UP (ref 0–0)
NT-PROBNP SERPL-SCNC: 343 PG/ML — HIGH (ref 0–300)
PLATELET # BLD AUTO: 185 K/UL — SIGNIFICANT CHANGE UP (ref 130–400)
POTASSIUM SERPL-MCNC: 4.5 MMOL/L — SIGNIFICANT CHANGE UP (ref 3.5–5)
POTASSIUM SERPL-SCNC: 4.5 MMOL/L — SIGNIFICANT CHANGE UP (ref 3.5–5)
PROT SERPL-MCNC: 6.7 G/DL — SIGNIFICANT CHANGE UP (ref 6–8)
PROTHROM AB SERPL-ACNC: 11.1 SEC — SIGNIFICANT CHANGE UP (ref 9.95–12.87)
RBC # BLD: 4.15 M/UL — LOW (ref 4.2–5.4)
RBC # FLD: 12.4 % — SIGNIFICANT CHANGE UP (ref 11.5–14.5)
SODIUM SERPL-SCNC: 142 MMOL/L — SIGNIFICANT CHANGE UP (ref 135–146)
TROPONIN T SERPL-MCNC: <0.01 NG/ML — SIGNIFICANT CHANGE UP
WBC # BLD: 7.59 K/UL — SIGNIFICANT CHANGE UP (ref 4.8–10.8)
WBC # FLD AUTO: 7.59 K/UL — SIGNIFICANT CHANGE UP (ref 4.8–10.8)

## 2019-04-26 PROCEDURE — 71046 X-RAY EXAM CHEST 2 VIEWS: CPT | Mod: 26

## 2019-04-26 PROCEDURE — 99285 EMERGENCY DEPT VISIT HI MDM: CPT

## 2019-04-26 PROCEDURE — 93010 ELECTROCARDIOGRAM REPORT: CPT

## 2019-04-26 PROCEDURE — 93970 EXTREMITY STUDY: CPT | Mod: 26

## 2019-04-26 RX ORDER — AMPICILLIN SODIUM AND SULBACTAM SODIUM 250; 125 MG/ML; MG/ML
3 INJECTION, POWDER, FOR SUSPENSION INTRAMUSCULAR; INTRAVENOUS ONCE
Qty: 0 | Refills: 0 | Status: COMPLETED | OUTPATIENT
Start: 2019-04-26 | End: 2019-04-26

## 2019-04-26 RX ORDER — DIPHENHYDRAMINE HCL 50 MG
50 CAPSULE ORAL ONCE
Qty: 0 | Refills: 0 | Status: COMPLETED | OUTPATIENT
Start: 2019-04-26 | End: 2019-04-26

## 2019-04-26 RX ADMIN — AMPICILLIN SODIUM AND SULBACTAM SODIUM 3 GRAM(S): 250; 125 INJECTION, POWDER, FOR SUSPENSION INTRAMUSCULAR; INTRAVENOUS at 19:51

## 2019-04-26 RX ADMIN — Medication 50 MILLIGRAM(S): at 21:39

## 2019-04-26 RX ADMIN — AMPICILLIN SODIUM AND SULBACTAM SODIUM 200 GRAM(S): 250; 125 INJECTION, POWDER, FOR SUSPENSION INTRAMUSCULAR; INTRAVENOUS at 18:58

## 2019-04-26 RX ADMIN — Medication 102 MILLIGRAM(S): at 21:37

## 2019-04-26 RX ADMIN — Medication 125 MILLIGRAM(S): at 21:36

## 2019-04-26 NOTE — ED ADULT TRIAGE NOTE - CHIEF COMPLAINT QUOTE
as per daughter she had a left lumpectomy a month ago and singe then her lower legs have become more red and swelling and blistering " as per daughter she had a left lumpectomy a month ago and singe then her lower legs have become more red , swollen and with blistering "

## 2019-04-26 NOTE — ED ADULT NURSE NOTE - CHIEF COMPLAINT QUOTE
as per daughter she had a left lumpectomy a month ago and singe then her lower legs have become more red , swollen and with blistering "

## 2019-04-27 DIAGNOSIS — Z98.890 OTHER SPECIFIED POSTPROCEDURAL STATES: Chronic | ICD-10-CM

## 2019-04-27 LAB
ALBUMIN SERPL ELPH-MCNC: 3.8 G/DL — SIGNIFICANT CHANGE UP (ref 3.5–5.2)
ALP SERPL-CCNC: 103 U/L — SIGNIFICANT CHANGE UP (ref 30–115)
ALT FLD-CCNC: 14 U/L — SIGNIFICANT CHANGE UP (ref 0–41)
ANION GAP SERPL CALC-SCNC: 14 MMOL/L — SIGNIFICANT CHANGE UP (ref 7–14)
AST SERPL-CCNC: 21 U/L — SIGNIFICANT CHANGE UP (ref 0–41)
BASOPHILS # BLD AUTO: 0 K/UL — SIGNIFICANT CHANGE UP (ref 0–0.2)
BASOPHILS NFR BLD AUTO: 0 % — SIGNIFICANT CHANGE UP (ref 0–1)
BILIRUB SERPL-MCNC: 0.4 MG/DL — SIGNIFICANT CHANGE UP (ref 0.2–1.2)
BUN SERPL-MCNC: 13 MG/DL — SIGNIFICANT CHANGE UP (ref 10–20)
CALCIUM SERPL-MCNC: 8.9 MG/DL — SIGNIFICANT CHANGE UP (ref 8.5–10.1)
CHLORIDE SERPL-SCNC: 102 MMOL/L — SIGNIFICANT CHANGE UP (ref 98–110)
CO2 SERPL-SCNC: 24 MMOL/L — SIGNIFICANT CHANGE UP (ref 17–32)
CREAT SERPL-MCNC: 0.7 MG/DL — SIGNIFICANT CHANGE UP (ref 0.7–1.5)
EOSINOPHIL # BLD AUTO: 0 K/UL — SIGNIFICANT CHANGE UP (ref 0–0.7)
EOSINOPHIL NFR BLD AUTO: 0 % — SIGNIFICANT CHANGE UP (ref 0–8)
GLUCOSE SERPL-MCNC: 172 MG/DL — HIGH (ref 70–99)
HCT VFR BLD CALC: 39.1 % — SIGNIFICANT CHANGE UP (ref 37–47)
HGB BLD-MCNC: 12.8 G/DL — SIGNIFICANT CHANGE UP (ref 12–16)
IMM GRANULOCYTES NFR BLD AUTO: 0.8 % — HIGH (ref 0.1–0.3)
LYMPHOCYTES # BLD AUTO: 0.49 K/UL — LOW (ref 1.2–3.4)
LYMPHOCYTES # BLD AUTO: 6.7 % — LOW (ref 20.5–51.1)
MCHC RBC-ENTMCNC: 29.8 PG — SIGNIFICANT CHANGE UP (ref 27–31)
MCHC RBC-ENTMCNC: 32.7 G/DL — SIGNIFICANT CHANGE UP (ref 32–37)
MCV RBC AUTO: 91.1 FL — SIGNIFICANT CHANGE UP (ref 81–99)
MONOCYTES # BLD AUTO: 0.11 K/UL — SIGNIFICANT CHANGE UP (ref 0.1–0.6)
MONOCYTES NFR BLD AUTO: 1.5 % — LOW (ref 1.7–9.3)
NEUTROPHILS # BLD AUTO: 6.63 K/UL — HIGH (ref 1.4–6.5)
NEUTROPHILS NFR BLD AUTO: 91 % — HIGH (ref 42.2–75.2)
NRBC # BLD: 0 /100 WBCS — SIGNIFICANT CHANGE UP (ref 0–0)
PLATELET # BLD AUTO: 181 K/UL — SIGNIFICANT CHANGE UP (ref 130–400)
POTASSIUM SERPL-MCNC: 4.6 MMOL/L — SIGNIFICANT CHANGE UP (ref 3.5–5)
POTASSIUM SERPL-SCNC: 4.6 MMOL/L — SIGNIFICANT CHANGE UP (ref 3.5–5)
PROT SERPL-MCNC: 6.4 G/DL — SIGNIFICANT CHANGE UP (ref 6–8)
RBC # BLD: 4.29 M/UL — SIGNIFICANT CHANGE UP (ref 4.2–5.4)
RBC # FLD: 12.4 % — SIGNIFICANT CHANGE UP (ref 11.5–14.5)
SODIUM SERPL-SCNC: 140 MMOL/L — SIGNIFICANT CHANGE UP (ref 135–146)
WBC # BLD: 7.29 K/UL — SIGNIFICANT CHANGE UP (ref 4.8–10.8)
WBC # FLD AUTO: 7.29 K/UL — SIGNIFICANT CHANGE UP (ref 4.8–10.8)

## 2019-04-27 PROCEDURE — 71275 CT ANGIOGRAPHY CHEST: CPT | Mod: 26

## 2019-04-27 RX ORDER — ZOLPIDEM TARTRATE 10 MG/1
10 TABLET ORAL AT BEDTIME
Qty: 0 | Refills: 0 | Status: DISCONTINUED | OUTPATIENT
Start: 2019-04-27 | End: 2019-05-02

## 2019-04-27 RX ORDER — ASPIRIN/CALCIUM CARB/MAGNESIUM 324 MG
1 TABLET ORAL
Qty: 0 | Refills: 0 | COMMUNITY

## 2019-04-27 RX ORDER — CALCITRIOL 0.5 UG/1
1 CAPSULE ORAL
Qty: 0 | Refills: 0 | COMMUNITY

## 2019-04-27 RX ORDER — ENOXAPARIN SODIUM 100 MG/ML
100 INJECTION SUBCUTANEOUS ONCE
Qty: 0 | Refills: 0 | Status: COMPLETED | OUTPATIENT
Start: 2019-04-27 | End: 2019-04-27

## 2019-04-27 RX ORDER — LEVOTHYROXINE SODIUM 125 MCG
150 TABLET ORAL DAILY
Qty: 0 | Refills: 0 | Status: DISCONTINUED | OUTPATIENT
Start: 2019-04-27 | End: 2019-05-02

## 2019-04-27 RX ORDER — ATORVASTATIN CALCIUM 80 MG/1
40 TABLET, FILM COATED ORAL AT BEDTIME
Qty: 0 | Refills: 0 | Status: DISCONTINUED | OUTPATIENT
Start: 2019-04-27 | End: 2019-05-02

## 2019-04-27 RX ORDER — LOSARTAN POTASSIUM 100 MG/1
50 TABLET, FILM COATED ORAL DAILY
Qty: 0 | Refills: 0 | Status: DISCONTINUED | OUTPATIENT
Start: 2019-04-27 | End: 2019-05-02

## 2019-04-27 RX ORDER — ESCITALOPRAM OXALATE 10 MG/1
10 TABLET, FILM COATED ORAL DAILY
Qty: 0 | Refills: 0 | Status: DISCONTINUED | OUTPATIENT
Start: 2019-04-27 | End: 2019-05-02

## 2019-04-27 RX ORDER — BUDESONIDE AND FORMOTEROL FUMARATE DIHYDRATE 160; 4.5 UG/1; UG/1
2 AEROSOL RESPIRATORY (INHALATION)
Qty: 0 | Refills: 0 | Status: DISCONTINUED | OUTPATIENT
Start: 2019-04-27 | End: 2019-05-02

## 2019-04-27 RX ORDER — HYDROCHLOROTHIAZIDE 25 MG
12.5 TABLET ORAL DAILY
Qty: 0 | Refills: 0 | Status: DISCONTINUED | OUTPATIENT
Start: 2019-04-27 | End: 2019-04-29

## 2019-04-27 RX ORDER — ATORVASTATIN CALCIUM 80 MG/1
1 TABLET, FILM COATED ORAL
Qty: 0 | Refills: 0 | COMMUNITY

## 2019-04-27 RX ORDER — GUAIFENESIN/DEXTROMETHORPHAN 600MG-30MG
10 TABLET, EXTENDED RELEASE 12 HR ORAL EVERY 6 HOURS
Qty: 0 | Refills: 0 | Status: DISCONTINUED | OUTPATIENT
Start: 2019-04-27 | End: 2019-05-02

## 2019-04-27 RX ORDER — HYALURONATE SODIUM 20 MG/2 ML
0 SYRINGE (ML) INTRAARTICULAR
Qty: 0 | Refills: 0 | COMMUNITY

## 2019-04-27 RX ORDER — MONTELUKAST 4 MG/1
10 TABLET, CHEWABLE ORAL DAILY
Qty: 0 | Refills: 0 | Status: DISCONTINUED | OUTPATIENT
Start: 2019-04-27 | End: 2019-05-02

## 2019-04-27 RX ORDER — IPRATROPIUM/ALBUTEROL SULFATE 18-103MCG
3 AEROSOL WITH ADAPTER (GRAM) INHALATION EVERY 4 HOURS
Qty: 0 | Refills: 0 | Status: DISCONTINUED | OUTPATIENT
Start: 2019-04-27 | End: 2019-05-02

## 2019-04-27 RX ORDER — APIXABAN 2.5 MG/1
10 TABLET, FILM COATED ORAL EVERY 12 HOURS
Qty: 0 | Refills: 0 | Status: DISCONTINUED | OUTPATIENT
Start: 2019-04-27 | End: 2019-05-02

## 2019-04-27 RX ADMIN — ESCITALOPRAM OXALATE 10 MILLIGRAM(S): 10 TABLET, FILM COATED ORAL at 11:31

## 2019-04-27 RX ADMIN — BUDESONIDE AND FORMOTEROL FUMARATE DIHYDRATE 2 PUFF(S): 160; 4.5 AEROSOL RESPIRATORY (INHALATION) at 20:01

## 2019-04-27 RX ADMIN — Medication 10 MILLILITER(S): at 11:30

## 2019-04-27 RX ADMIN — Medication 1 TABLET(S): at 17:35

## 2019-04-27 RX ADMIN — Medication 40 MILLIGRAM(S): at 06:24

## 2019-04-27 RX ADMIN — BUDESONIDE AND FORMOTEROL FUMARATE DIHYDRATE 2 PUFF(S): 160; 4.5 AEROSOL RESPIRATORY (INHALATION) at 08:08

## 2019-04-27 RX ADMIN — APIXABAN 10 MILLIGRAM(S): 2.5 TABLET, FILM COATED ORAL at 17:35

## 2019-04-27 RX ADMIN — APIXABAN 10 MILLIGRAM(S): 2.5 TABLET, FILM COATED ORAL at 06:24

## 2019-04-27 RX ADMIN — Medication 5 MILLILITER(S): at 23:04

## 2019-04-27 RX ADMIN — Medication 10 MILLILITER(S): at 06:28

## 2019-04-27 RX ADMIN — ENOXAPARIN SODIUM 100 MILLIGRAM(S): 100 INJECTION SUBCUTANEOUS at 02:50

## 2019-04-27 RX ADMIN — Medication 150 MICROGRAM(S): at 06:24

## 2019-04-27 RX ADMIN — Medication 1 TABLET(S): at 06:24

## 2019-04-27 RX ADMIN — Medication 12.5 MILLIGRAM(S): at 06:25

## 2019-04-27 RX ADMIN — LOSARTAN POTASSIUM 50 MILLIGRAM(S): 100 TABLET, FILM COATED ORAL at 06:24

## 2019-04-27 RX ADMIN — MONTELUKAST 10 MILLIGRAM(S): 4 TABLET, CHEWABLE ORAL at 11:31

## 2019-04-27 RX ADMIN — Medication 10 MILLILITER(S): at 17:36

## 2019-04-27 RX ADMIN — ATORVASTATIN CALCIUM 40 MILLIGRAM(S): 80 TABLET, FILM COATED ORAL at 21:01

## 2019-04-27 NOTE — PROGRESS NOTE ADULT - SUBJECTIVE AND OBJECTIVE BOX
Patient is a 74y old  Female who presents with a chief complaint of cough and Le swelling (27 Apr 2019 04:03)      HPI:  73 yo lady with PMH of well controlled asthma, Venous insufficiency, HTN, OA, left ductal carcinoma S/P lumpectomy on April 3rd , complicated by anaphylaxis from anesthesia vs antibiotics given at that time presenting for the above CC. for the last 10 days the patient was complaining of persistent nagging cough not relieved with cough medicine , was seen by Dr Ahn who gave her a course of Azithromycin and Prednisone for presumed URI with no major improvement . Denies any chest pain or SOB . Also states that she has worsening of her chronic bilateral LE edema , along with increased erythema in the left shin . No fever (27 Apr 2019 04:03)      INTERVAL HPI/OVERNIGHT EVENTS:  T(C): 36.1 (04-27-19 @ 05:30), Max: 36.8 (04-26-19 @ 18:50)  HR: 82 (04-27-19 @ 05:30) (66 - 93)  BP: 143/88 (04-27-19 @ 05:30) (130/86 - 158/92)  RR: 18 (04-27-19 @ 05:30) (18 - 20)  SpO2: 96% (04-27-19 @ 08:13) (96% - 98%)  Wt(kg): --  I&O's Summary    PHYSICAL EXAM:  GENERAL: NAD, well-groomed, well-developed  HEAD:  Atraumatic, Normocephalic  EYES: EOMI, PERRLA, conjunctiva and sclera clear  ENMT: No tonsillar erythema, exudates, or enlargement; Moist mucous membranes, Good dentition, No lesions  lung clear  heart normal  ext redness on leg     MEDICATIONS  (STANDING):  amoxicillin  875 milliGRAM(s)/clavulanate 1 Tablet(s) Oral every 12 hours  apixaban 10 milliGRAM(s) Oral every 12 hours  atorvastatin 40 milliGRAM(s) Oral at bedtime  buDESOnide 160 MICROgram(s)/formoterol 4.5 MICROgram(s) Inhaler 2 Puff(s) Inhalation two times a day  escitalopram 10 milliGRAM(s) Oral daily  guaiFENesin/dextromethorphan  Syrup 10 milliLiter(s) Oral every 6 hours  hydrochlorothiazide 12.5 milliGRAM(s) Oral daily  levothyroxine 150 MICROGram(s) Oral daily  losartan 50 milliGRAM(s) Oral daily  montelukast 10 milliGRAM(s) Oral daily  predniSONE   Tablet 40 milliGRAM(s) Oral daily    MEDICATIONS  (PRN):  ALBUTerol/ipratropium for Nebulization 3 milliLiter(s) Nebulizer every 4 hours PRN Shortness of Breath and/or Wheezing  zolpidem 10 milliGRAM(s) Oral at bedtime PRN Insomnia      LABS:                        12.2   7.59  )-----------( 185      ( 26 Apr 2019 17:32 )             38.1     04-26    142  |  104  |  9<L>  ----------------------------<  136<H>  4.5   |  25  |  0.8    Ca    9.0      26 Apr 2019 17:32    TPro  6.7  /  Alb  4.0  /  TBili  0.3  /  DBili  <0.2  /  AST  19  /  ALT  14  /  AlkPhos  107  04-26    PT/INR - ( 26 Apr 2019 17:32 )   PT: 11.10 sec;   INR: 0.96 ratio         PTT - ( 26 Apr 2019 17:32 )  PTT:29.9 sec    CAPILLARY BLOOD GLUCOSE                  NO

## 2019-04-27 NOTE — PATIENT PROFILE ADULT - DO YOU FEEL LIKE HURTING YOURSELF OR OTHERS?
Subjective:       Patient ID: Teri Lo is a 67 y.o. female.    Chief Complaint: discuss dexa treatment      HPI  Ms. Lo presents to clinic today to discuss bone density scan.   She states she has been doing ok otherwise.   She does not smoke.   She does not currently exercise.   She does take a multivitamin with vitamin d.   Her blood pressure is elevated and remains elevated at most of her doctor visit.   She is willing to get on medicine.     Review of Systems   Constitutional: Negative for fever.   Respiratory: Negative for cough and shortness of breath.    Cardiovascular: Negative for chest pain.   Gastrointestinal: Negative for abdominal pain, blood in stool, nausea and vomiting.   Genitourinary: Negative for difficulty urinating and hematuria.   Skin: Negative for rash.   Neurological: Negative for dizziness and headaches.       Medication List with Changes/Refills   Current Medications    LEDIPASVIR-SOFOSBUVIR  MG TAB    Take 1 tablet by mouth once daily.       Patient Active Problem List   Diagnosis    Vitreomacular adhesion of left eye    Cortical age-related cataract of both eyes    Colon cancer screening    Special screening for malignant neoplasms, colon    Colon polyps         Objective:     Physical Exam   Constitutional: She is oriented to person, place, and time. She appears well-developed and well-nourished. No distress.   HENT:   Head: Normocephalic and atraumatic.   Eyes: EOM are normal. Right eye exhibits no discharge. Left eye exhibits no discharge.   Cardiovascular: Normal rate and regular rhythm.   Pulmonary/Chest: Effort normal and breath sounds normal. No respiratory distress. She has no wheezes.   Musculoskeletal: She exhibits no edema.   Neurological: She is alert and oriented to person, place, and time.   Skin: Skin is warm and dry. She is not diaphoretic. No erythema.   Psychiatric: She has a normal mood and affect.   Vitals reviewed.    Vitals:    08/13/18 1435   BP:  (!) 171/86   Pulse: 85   Temp: 98.8 °F (37.1 °C)       Assessment/  PLAN     Osteoporosis without current pathological fracture, unspecified osteoporosis type  -     Vitamin D; Future; Expected date: 11/13/2018  -     alendronate (FOSAMAX) 70 MG tablet; Take 1 tablet (70 mg total) by mouth every 7 days.  Dispense: 4 tablet; Refill: 5  - discussed weight bearing exercise, vit d pills, avoid falls, etc.     Essential hypertension  -     losartan-hydrochlorothiazide 50-12.5 mg (HYZAAR) 50-12.5 mg per tablet; Take 1 tablet by mouth once daily.  Dispense: 90 tablet; Refill: 0      rtc 1 month for follow up on htn     Angelica Navarrete MD  [facility]      no

## 2019-04-27 NOTE — ED PROVIDER NOTE - PHYSICAL EXAMINATION
B/L lower extremity with 3+ edema, +erythema of the B/L legs L >>R, no crepitus, no calf tenderness, no lymphangitis. B/L lower ext are distally NVI. B/L lower extremity with 3+ edema, +erythema of the B/L legs L >>R, no crepitus, no calf tenderness, no lymphangitis. B/L lower ext are distally NVI.  Skin: B/L leg erythema, warm, tender, no crepitus, no other rash.

## 2019-04-27 NOTE — H&P ADULT - HISTORY OF PRESENT ILLNESS
73 yo lady with PMH of well controlled asthma, Venous insufficiency, HTN, OA, left ductal carcinoma S/P lumpectomy on April 3rd , complicated by anaphylaxis from anesthesia vs antibiotics given at that time presenting for the above CC. for the last 10 days the patient was complaining of persistent nagging cough not relieved with cough medicine , was seen by Dr Ahn who gave her a course of Azithromycin and Prednisone for presumed URI with no major improvement . Denies any chest pain or SOB . Also states that she has worsening of her chronic bilateral LE edema , along with increased erythema in the left shin . No fever

## 2019-04-27 NOTE — ED PROVIDER NOTE - OBJECTIVE STATEMENT
patient is c/o B/L lower extremity edema/erythema and intermittent episodes of cough/sob x one month. Patient states that one month ago had breast surgery for removal of mass, which was localized malignancy, after the surgery, in the recovery room, developed anaphylaxis, was admitted to ICU, unknown cause of anaphylaxis. Patient has been having sob/cough since then, had been to PMD, was given abx and steroids, symptoms continued on-off, has been to her doctor again, was given steroids, currently is on prednisone. Patient also started having worsening B/L lower ext swelling, associated with redness, which continued to worse, so was brought to ED today as per her doctor recommendations. Patient denies chest pain/abd pain, denies dizziness/syncope, denies f/c/n/v. Patient states that breast surgery area healed well, is scheduled for appointments next week with oncology/radiation oncology and allergy doctors. Denies travel, no sick contacts.

## 2019-04-27 NOTE — H&P ADULT - ASSESSMENT
75 yo lady with PMH of well controlled asthma, Venous insufficiency, HTN, OA, left ductal carcinoma S/P lumpectomy presenting for 10 days hx of cough , along with worsening LE swelling and erythema    1)Subsegmental PE      No evidence of Right heart strain      Trop negative      Will start Eliquis for AC      Pulm eval (Dr Hickey)    2)Dry Cough     Likely hyperactive airway disease     Currently improved and not wheezing on exam     Continue PO Prednisone, cough suppressants     Inhalers PRN     3)LE cellulitis with chronic venous insufficiency     Start Augmentin PO     4)Left ducal Ca S/P lumpectomy with clear axillary LN      Evidence of density on left breast      Likely post surgical     #DVT px: Eliquis PO   #GI px: none 73 yo lady with PMH of well controlled asthma, Venous insufficiency, HTN, OA, left ductal carcinoma S/P lumpectomy presenting for 10 days hx of cough , along with worsening LE swelling and erythema    1)Subsegmental PE      No evidence of Right heart strain      Trop negative      Will start Eliquis for AC      Pulm eval (Dr Hickey)    2)Dry Cough     Likely hyperactive airway disease     Currently improved and not wheezing on exam     Continue PO Prednisone, cough suppressants     Inhalers PRN     3)LE cellulitis with chronic venous insufficiency     Start Augmentin PO     Duplex LE pending     4)Left ducal Ca S/P lumpectomy with clear axillary LN      Evidence of density on left breast      Likely post surgical     #DVT px: Eliquis PO   #GI px: none

## 2019-04-27 NOTE — PATIENT PROFILE ADULT - VISION (WITH CORRECTIVE LENSES IF THE PATIENT USUALLY WEARS THEM):
Partially impaired: cannot see medication labels or newsprint, but can see obstacles in path, and the surrounding layout; can count fingers at arm's length/sent home with daughter

## 2019-04-27 NOTE — PROGRESS NOTE ADULT - ASSESSMENT
>>> subsegmental PE --no evidence of right heart strain  >>> cough improved     plan ------------------------      pulmonary  consult   amoxicillin  875 milliGRAM(s)/clavulanate 1 Tablet(s) Oral every 12 hours  apixaban 10 milliGRAM(s) Oral every 12 hours  atorvastatin 40 milliGRAM(s) Oral at bedtime  buDESOnide 160 MICROgram(s)/formoterol 4.5 MICROgram(s) Inhaler 2 Puff(s) Inhalation two times a day  escitalopram 10 milliGRAM(s) Oral daily  guaiFENesin/dextromethorphan  Syrup 10 milliLiter(s) Oral every 6 hours  hydrochlorothiazide 12.5 milliGRAM(s) Oral daily  levothyroxine 150 MICROGram(s) Oral daily  losartan 50 milliGRAM(s) Oral daily  montelukast 10 milliGRAM(s) Oral daily  predniSONE   Tablet 40 milliGRAM(s) Oral daily

## 2019-04-27 NOTE — H&P ADULT - NSHPPHYSICALEXAM_GEN_ALL_CORE
T(C): 36.6 (04-27-19 @ 03:21), Max: 36.8 (04-26-19 @ 18:50)  HR: 66 (04-27-19 @ 03:21) (66 - 93)  BP: 146/75 (04-27-19 @ 03:21) (130/86 - 158/92)  RR: 18 (04-27-19 @ 03:21) (18 - 20)  SpO2: 98% (04-27-19 @ 03:21) (96% - 98%)    PHYSICAL EXAM:  GENERAL: NAD, well-developed  NECK: Supple, No JVD  CHEST/LUNG: Clear to auscultation bilaterally; No wheeze  HEART: Regular rate and rhythm; No murmurs, rubs, or gallops  ABDOMEN: Soft, Nontender, Nondistended; Bowel sounds present  EXTREMITIES:  2+ Peripheral Pulses, No clubbing, cyanosis, 2+ edema , erythema more noted on left shin

## 2019-04-27 NOTE — ED PROVIDER NOTE - CLINICAL SUMMARY MEDICAL DECISION MAKING FREE TEXT BOX
patient remained hemodynamically stable in ED, improved well, labs/EKG/CXR/CT results noted and discussed with patient and her daugher, abx and Lovenox ordered, discussed with admitting physician and MAR, patient is admitted to Medicine for further care.

## 2019-04-27 NOTE — H&P ADULT - NSHPLABSRESULTS_GEN_ALL_CORE
12.2   7.59  )-----------( 185      ( 26 Apr 2019 17:32 )             38.1       04-26    142  |  104  |  9<L>  ----------------------------<  136<H>  4.5   |  25  |  0.8    Ca    9.0      26 Apr 2019 17:32    TPro  6.7  /  Alb  4.0  /  TBili  0.3  /  DBili  <0.2  /  AST  19  /  ALT  14  /  AlkPhos  107  04-26                  PT/INR - ( 26 Apr 2019 17:32 )   PT: 11.10 sec;   INR: 0.96 ratio         PTT - ( 26 Apr 2019 17:32 )  PTT:29.9 sec    Lactate Trend  04-26 @ 17:32 Lactate:2.2       CARDIAC MARKERS ( 26 Apr 2019 17:32 )  x     / <0.01 ng/mL / 92 U/L / x     / x            CAPILLARY BLOOD GLUCOSE          < from: CT Angio Chest w/ IV Cont (04.27.19 @ 01:14) >    Subsegmental pulmonary emboli in the right lower lobe as described. No CT   evidence of right heart strain.    Asymmetric left breast soft tissue density. Correlate with known history   of breast cancer.    Dr. Dawson Staufferscussed preliminary findings with PIPO RICCI MD   on 4/26/2019 10:42 PM with readback.    < end of copied text >

## 2019-04-28 RX ORDER — CHLORHEXIDINE GLUCONATE 213 G/1000ML
1 SOLUTION TOPICAL
Qty: 0 | Refills: 0 | Status: DISCONTINUED | OUTPATIENT
Start: 2019-04-28 | End: 2019-05-02

## 2019-04-28 RX ADMIN — LOSARTAN POTASSIUM 50 MILLIGRAM(S): 100 TABLET, FILM COATED ORAL at 05:14

## 2019-04-28 RX ADMIN — Medication 10 MILLILITER(S): at 05:14

## 2019-04-28 RX ADMIN — Medication 150 MICROGRAM(S): at 05:14

## 2019-04-28 RX ADMIN — BUDESONIDE AND FORMOTEROL FUMARATE DIHYDRATE 2 PUFF(S): 160; 4.5 AEROSOL RESPIRATORY (INHALATION) at 08:21

## 2019-04-28 RX ADMIN — MONTELUKAST 10 MILLIGRAM(S): 4 TABLET, CHEWABLE ORAL at 11:11

## 2019-04-28 RX ADMIN — Medication 10 MILLILITER(S): at 23:10

## 2019-04-28 RX ADMIN — Medication 10 MILLILITER(S): at 11:11

## 2019-04-28 RX ADMIN — Medication 10 MILLILITER(S): at 17:00

## 2019-04-28 RX ADMIN — Medication 12.5 MILLIGRAM(S): at 05:14

## 2019-04-28 RX ADMIN — Medication 40 MILLIGRAM(S): at 05:14

## 2019-04-28 RX ADMIN — Medication 1 TABLET(S): at 05:14

## 2019-04-28 RX ADMIN — BUDESONIDE AND FORMOTEROL FUMARATE DIHYDRATE 2 PUFF(S): 160; 4.5 AEROSOL RESPIRATORY (INHALATION) at 21:05

## 2019-04-28 RX ADMIN — ATORVASTATIN CALCIUM 40 MILLIGRAM(S): 80 TABLET, FILM COATED ORAL at 21:05

## 2019-04-28 RX ADMIN — APIXABAN 10 MILLIGRAM(S): 2.5 TABLET, FILM COATED ORAL at 05:14

## 2019-04-28 RX ADMIN — APIXABAN 10 MILLIGRAM(S): 2.5 TABLET, FILM COATED ORAL at 17:00

## 2019-04-28 RX ADMIN — Medication 1 TABLET(S): at 17:00

## 2019-04-28 RX ADMIN — ESCITALOPRAM OXALATE 10 MILLIGRAM(S): 10 TABLET, FILM COATED ORAL at 11:11

## 2019-04-28 NOTE — PROGRESS NOTE ADULT - ASSESSMENT
>>> subsegmental PE --no evidence of right heart strain  >>> cough improved   >>>cellulitis of lower ext  >>>obesity   >>>OA (osteoarthritis)  >>>Breast cancer: LEFT  >>>PVD (peripheral vascular disease)  >>>HTN (hypertension)  >>>Asthma: going to see allergist out pt d/w daughter  >>>S/P lumpectomy, left breast  >>>H/O thyroidectomy: 2017    plan ------------------------  d/w daughter need to change appointment for radiation oncology  because pt now in hospital  infectious dis consult for antibiotic   pulmonary  consult pending   amoxicillin  875 milliGRAM(s)/clavulanate 1 Tablet(s) Oral every 12 hours  apixaban 10 milliGRAM(s) Oral every 12 hours  atorvastatin 40 milliGRAM(s) Oral at bedtime  buDESOnide 160 MICROgram(s)/formoterol 4.5 MICROgram(s) Inhaler 2 Puff(s) Inhalation two times a day  escitalopram 10 milliGRAM(s) Oral daily  guaiFENesin/dextromethorphan  Syrup 10 milliLiter(s) Oral every 6 hours  hydrochlorothiazide 12.5 milliGRAM(s) Oral daily  levothyroxine 150 MICROGram(s) Oral daily  losartan 50 milliGRAM(s) Oral daily  montelukast 10 milliGRAM(s) Oral daily  predniSONE   Tablet 40 milliGRAM(s) Oral daily

## 2019-04-28 NOTE — PROGRESS NOTE ADULT - SUBJECTIVE AND OBJECTIVE BOX
SUBJECTIVE:    Patient is a 74y old Female who presents with a chief complaint of cough and Le swelling (27 Apr 2019 11:44)    Currently admitted to medicine with the primary diagnosis of Pulmonary embolism     Today is hospital day 1 day  c/o dizziness and difficulty breathing      PAST MEDICAL & SURGICAL HISTORY  OA (osteoarthritis)  Breast cancer: LEFT  PVD (peripheral vascular disease)  HTN (hypertension)  Asthma: NEVER HAD ATTACK- ALLERGY INDUCED  S/P lumpectomy, left breast  H/O thyroidectomy: 2017    SOCIAL HISTORY:  Negative for smoking/alcohol/drug use.     ALLERGIES:  No Known Allergies    MEDICATIONS:  STANDING MEDICATIONS  amoxicillin  875 milliGRAM(s)/clavulanate 1 Tablet(s) Oral every 12 hours  apixaban 10 milliGRAM(s) Oral every 12 hours  atorvastatin 40 milliGRAM(s) Oral at bedtime  buDESOnide 160 MICROgram(s)/formoterol 4.5 MICROgram(s) Inhaler 2 Puff(s) Inhalation two times a day  chlorhexidine 4% Liquid 1 Application(s) Topical <User Schedule>  escitalopram 10 milliGRAM(s) Oral daily  guaiFENesin/dextromethorphan  Syrup 10 milliLiter(s) Oral every 6 hours  hydrochlorothiazide 12.5 milliGRAM(s) Oral daily  levothyroxine 150 MICROGram(s) Oral daily  losartan 50 milliGRAM(s) Oral daily  montelukast 10 milliGRAM(s) Oral daily  predniSONE   Tablet 40 milliGRAM(s) Oral daily    PRN MEDICATIONS  ALBUTerol/ipratropium for Nebulization 3 milliLiter(s) Nebulizer every 4 hours PRN  zolpidem 10 milliGRAM(s) Oral at bedtime PRN    VITALS:   T(F): 97.4  HR: 62  BP: 114/62  RR: 18  SpO2: 99%    LABS:                        12.8   7.29  )-----------( 181      ( 27 Apr 2019 11:53 )             39.1     04-27    140  |  102  |  13  ----------------------------<  172<H>  4.6   |  24  |  0.7    Ca    8.9      27 Apr 2019 11:53    TPro  6.4  /  Alb  3.8  /  TBili  0.4  /  DBili  x   /  AST  21  /  ALT  14  /  AlkPhos  103  04-27    PT/INR - ( 26 Apr 2019 17:32 )   PT: 11.10 sec;   INR: 0.96 ratio         PTT - ( 26 Apr 2019 17:32 )  PTT:29.9 sec          CARDIAC MARKERS ( 26 Apr 2019 17:32 )  x     / <0.01 ng/mL / 92 U/L / x     / x          RADIOLOGY:    PHYSICAL EXAM:  GEN: No acute distress  LUNGS: Clear to auscultation bilaterally   HEART: Regular  ABD: Soft, non-tender, non-distended.  EXT: bilateral lower ext leg redness warm to touch   NEURO: AAOX3    Intravenous access:   NG tube:   Gomez Catheter:

## 2019-04-29 ENCOUNTER — TRANSCRIPTION ENCOUNTER (OUTPATIENT)
Age: 74
End: 2019-04-29

## 2019-04-29 LAB
HCV AB S/CO SERPL IA: 0.17 S/CO — SIGNIFICANT CHANGE UP (ref 0–0.99)
HCV AB SERPL-IMP: SIGNIFICANT CHANGE UP

## 2019-04-29 PROCEDURE — 93306 TTE W/DOPPLER COMPLETE: CPT | Mod: 26

## 2019-04-29 RX ADMIN — APIXABAN 10 MILLIGRAM(S): 2.5 TABLET, FILM COATED ORAL at 06:29

## 2019-04-29 RX ADMIN — MONTELUKAST 10 MILLIGRAM(S): 4 TABLET, CHEWABLE ORAL at 11:10

## 2019-04-29 RX ADMIN — Medication 150 MICROGRAM(S): at 06:29

## 2019-04-29 RX ADMIN — Medication 10 MILLILITER(S): at 11:11

## 2019-04-29 RX ADMIN — APIXABAN 10 MILLIGRAM(S): 2.5 TABLET, FILM COATED ORAL at 17:10

## 2019-04-29 RX ADMIN — Medication 10 MILLILITER(S): at 23:37

## 2019-04-29 RX ADMIN — Medication 12.5 MILLIGRAM(S): at 06:29

## 2019-04-29 RX ADMIN — ESCITALOPRAM OXALATE 10 MILLIGRAM(S): 10 TABLET, FILM COATED ORAL at 11:10

## 2019-04-29 RX ADMIN — ATORVASTATIN CALCIUM 40 MILLIGRAM(S): 80 TABLET, FILM COATED ORAL at 21:00

## 2019-04-29 RX ADMIN — Medication 1 TABLET(S): at 06:29

## 2019-04-29 RX ADMIN — BUDESONIDE AND FORMOTEROL FUMARATE DIHYDRATE 2 PUFF(S): 160; 4.5 AEROSOL RESPIRATORY (INHALATION) at 20:59

## 2019-04-29 RX ADMIN — Medication 30 MILLIGRAM(S): at 11:10

## 2019-04-29 RX ADMIN — LOSARTAN POTASSIUM 50 MILLIGRAM(S): 100 TABLET, FILM COATED ORAL at 06:29

## 2019-04-29 RX ADMIN — Medication 1 TABLET(S): at 17:10

## 2019-04-29 RX ADMIN — Medication 40 MILLIGRAM(S): at 06:29

## 2019-04-29 RX ADMIN — Medication 10 MILLILITER(S): at 17:11

## 2019-04-29 NOTE — PROGRESS NOTE ADULT - ASSESSMENT
73 yo lady with PMH of well controlled asthma, Venous insufficiency, HTN, OA, left ductal carcinoma S/P lumpectomy presenting for 10 days hx of cough , along with worsening LE swelling and erythema    1) RLL Subsegmental PE w/  evidence of Right heart strain     - CE x3 -ve  - Currently on Apixaban 10mg q12hr  - F/u Pulmonary (Dr. Hickey)  - F/u 2-D echo    2)Dry Cough - improved    Likely hyperactive airway disease   - Prednisone taper    3) B/L LE erythema, warmth likely secondary to chronic venous insufficiency  - Pt afebrile, no leukocytosis  - Will D/C Augmentin PO if ok with ID    VA Duplex: Negative       4)Left ducal Ca S/P lumpectomy with clear axillary LN      Evidence of density on left breast      Likely post surgical     #DVT px: Eliquis PO   #GI px: none  Dispo: pending Pulm eval. PT/Rehab

## 2019-04-29 NOTE — CONSULT NOTE ADULT - SUBJECTIVE AND OBJECTIVE BOX
EL STEIN  MRN-[id number]    HISTORY OF PRESENT ILLNESS:    75 yo lady with PMH of well controlled asthma, Venous insufficiency, HTN, OA, left ductal carcinoma S/P lumpectomy on April 3rd , complicated by anaphylaxis from anesthesia vs antibiotics given at that time admitted with acute subsegmental pe.  Pt recently given prednisone course for acute asthma exacerbation  Cough sob improved  Feeling better overall  No chest pain  admits to labile bp and dizziness upon standing        PMH/PSH:  PAST MEDICAL & SURGICAL HISTORY:  OA (osteoarthritis)  Breast cancer: LEFT  PVD (peripheral vascular disease)  HTN (hypertension)  Asthma  S/P lumpectomy, left breast  H/O thyroidectomy: 2017    ALLERGIES:  Allergies    No Known Allergies    Intolerances      SOCIAL HABITS:  negative x 3    FAMILY HISTORY:   FAMILY HISTORY:      REVIEW OF SYSTEM:  Elements of review of systems are negative or non-applicable except as noted above in HPI section.       HOME MEDICATIONS:  calcitriol 0.5 mcg oral capsule  levothyroxine 150 mcg (0.15 mg) oral tablet  Lexapro 10 mg oral tablet  Lipitor 40 mg oral tablet  losartan-hydrochlorothiazide 50mg-12.5mg oral tablet  montelukast 10 mg oral tablet  Symbicort 160 mcg-4.5 mcg/inh inhalation aerosol  zolpidem 10 mg oral tablet    MEDICATIONS:  MEDICATIONS  (STANDING):  amoxicillin  875 milliGRAM(s)/clavulanate 1 Tablet(s) Oral every 12 hours  apixaban 10 milliGRAM(s) Oral every 12 hours  atorvastatin 40 milliGRAM(s) Oral at bedtime  buDESOnide 160 MICROgram(s)/formoterol 4.5 MICROgram(s) Inhaler 2 Puff(s) Inhalation two times a day  chlorhexidine 4% Liquid 1 Application(s) Topical <User Schedule>  escitalopram 10 milliGRAM(s) Oral daily  guaiFENesin/dextromethorphan  Syrup 10 milliLiter(s) Oral every 6 hours  hydrochlorothiazide 12.5 milliGRAM(s) Oral daily  levothyroxine 150 MICROGram(s) Oral daily  losartan 50 milliGRAM(s) Oral daily  montelukast 10 milliGRAM(s) Oral daily  predniSONE   Tablet 30 milliGRAM(s) Oral daily    MEDICATIONS  (PRN):  ALBUTerol/ipratropium for Nebulization 3 milliLiter(s) Nebulizer every 4 hours PRN Shortness of Breath and/or Wheezing  zolpidem 10 milliGRAM(s) Oral at bedtime PRN Insomnia        VITALS:   Vital Signs Last 24 Hrs  T(C): 36.7 (29 Apr 2019 14:45), Max: 36.8 (28 Apr 2019 20:26)  T(F): 98 (29 Apr 2019 14:45), Max: 98.2 (28 Apr 2019 20:26)  HR: 83 (29 Apr 2019 14:45) (63 - 83)  BP: 136/93 (29 Apr 2019 14:45) (95/54 - 136/93)  BP(mean): --  RR: 18 (29 Apr 2019 05:22) (18 - 18)  SpO2: --        PHYSICAL EXAM:    GENERAL: NAD  HEAD:  Atraumatic, Normocephalic  NECK: Supple, No JVD  CHEST/LUNG: Clear to auscultation bilaterally;   HEART: Regular rate and rhythm; No murmurs  ABDOMEN: Soft, Nontender, Nondistended  EXTREMITIES:  Good peripheral Pulses, No clubbing, cyanosis, edmea and eythema left greater than right leg      LABS:            DIAGNOSTIC STUDIES:  < from: CT Angio Chest w/ IV Cont (04.27.19 @ 01:14) >    IMPRESSION:     Subsegmental pulmonary emboli in the right lower lobe as described. No CT   evidence of right heart strain.    Asymmetric left breast soft tissue density. Correlate with known history   of breast cancer.    < end of copied text >

## 2019-04-29 NOTE — CONSULT NOTE ADULT - ASSESSMENT
ASSESSMENT:  This is a 73 yo female with PMH of well controlled asthma, Venous insufficiency, HTN, OA, left ductal carcinoma S/P lumpectomy on April 3rd , complicated by anaphylaxis from anesthesia vs antibiotics given at that time presenting with a cough and LE swelling. ASSESSMENT:  This is a 73 yo female with PMH of well controlled asthma, Venous insufficiency, HTN, OA, left ductal carcinoma S/P lumpectomy on April 3rd , complicated by anaphylaxis from anesthesia vs antibiotics given at that time presenting with a cough and LE swelling.    #Cough likely secondary to reactive airway disease vs subsegmental PE   - Pulmonary follow up   - No wheezing on exam  - Continue on inhalers     #Bilateral Lower extremity cellulitis - improving (compared to pictures taken on Cell phone)  - Duplex negative   - Switch to clindamycin 600mg q8 for total duration of 7 days ASSESSMENT:  This is a 73 yo female with PMH of well controlled asthma, Venous insufficiency, HTN, OA, left ductal carcinoma S/P lumpectomy on April 3rd , complicated by anaphylaxis from anesthesia vs antibiotics given at that time presenting with a cough and LE swelling.    #Cough likely secondary to reactive airway disease vs subsegmental PE   - Pulmonary follow up   - No wheezing on exam  - Continue on inhalers     #Bilateral Lower extremity cellulitis likely vs stasis dermatitis - improved on antibiotics  - Duplex negative for DVTs  - Switch Augmentin to Doxycycline 100mg PO BID to complete a course of 7 days of treatment (last day of treatment 5/2/2019) ASSESSMENT:  This is a 73 yo female with PMH of well controlled asthma, Venous insufficiency, HTN, OA, left ductal carcinoma S/P lumpectomy on April 3rd , complicated by anaphylaxis from anesthesia vs antibiotics given at that time presenting with a cough and LE swelling.    #Cough likely secondary to reactive airway disease vs subsegmental PE   - Pulmonary follow up   - No wheezing on exam  - Continue on inhalers     #Bilateral Lower extremity cellulitis likely vs stasis dermatitis - improved on antibiotics  - Duplex negative for DVTs  - Switch Augmentin to Doxycycline 100mg PO BID to complete a course of 7 days of treatment (last day of treatment 5/2/2019)    case discussed with Dr. Schaffer ASSESSMENT:  This is a 73 yo female with PMH of well controlled asthma, Venous insufficiency, HTN, OA, left ductal carcinoma S/P lumpectomy on April 3rd , complicated by anaphylaxis from anesthesia vs antibiotics given at that time presenting with a cough and LE swelling.  Sepsis ruled out on admission   CXR atelectasis    #Cough likely secondary to reactive airway disease vs subsegmental PE   - Pulmonary follow up   - No wheezing on exam  - Continue on inhalers     #Bilateral Lower extremity cellulitis likely vs stasis dermatitis - improved on antibiotics  - Duplex negative for DVTs  - Switch Augmentin to Doxycycline 100mg PO BID to complete a course of 7 days of treatment (last day of treatment 5/2/2019)    case discussed with Dr. Schaffer

## 2019-04-29 NOTE — PROGRESS NOTE ADULT - ASSESSMENT
pe/right ventric strain  chr venous stasis  ductal ca s/p lumpectomy    pulm eval dr vanessa  [facility]  [facility]

## 2019-04-29 NOTE — PROGRESS NOTE ADULT - SUBJECTIVE AND OBJECTIVE BOX
Patient was seen and examined. Spoke with RN. Chart reviewed.  c/o lighthteadness, sob,  daughter at bedside,  d/w ho    No events overnight.  Vital Signs Last 24 Hrs  T(F): 97.2 (29 Apr 2019 05:22), Max: 98.2 (28 Apr 2019 20:26)  HR: 63 (29 Apr 2019 05:22) (63 - 77)  BP: 95/54 (29 Apr 2019 05:22) (95/54 - 115/56)  SpO2: --  MEDICATIONS  (STANDING):  amoxicillin  875 milliGRAM(s)/clavulanate 1 Tablet(s) Oral every 12 hours  apixaban 10 milliGRAM(s) Oral every 12 hours  atorvastatin 40 milliGRAM(s) Oral at bedtime  buDESOnide 160 MICROgram(s)/formoterol 4.5 MICROgram(s) Inhaler 2 Puff(s) Inhalation two times a day  chlorhexidine 4% Liquid 1 Application(s) Topical <User Schedule>  escitalopram 10 milliGRAM(s) Oral daily  guaiFENesin/dextromethorphan  Syrup 10 milliLiter(s) Oral every 6 hours  hydrochlorothiazide 12.5 milliGRAM(s) Oral daily  levothyroxine 150 MICROGram(s) Oral daily  losartan 50 milliGRAM(s) Oral daily  montelukast 10 milliGRAM(s) Oral daily  predniSONE   Tablet 30 milliGRAM(s) Oral daily    MEDICATIONS  (PRN):  ALBUTerol/ipratropium for Nebulization 3 milliLiter(s) Nebulizer every 4 hours PRN Shortness of Breath and/or Wheezing  zolpidem 10 milliGRAM(s) Oral at bedtime PRN Insomnia    Labs:                  Radiology:    General: comfortable, NAD  Neurology: A&Ox3, nonfocal  Head:  Normocephalic, atraumatic  ENT:  Mucosa moist, no ulcerations  Neck:  Supple, no JVD,   Skin: no breakdown  Resp: CTA B/L  CV: RRR, S1S2,   GI: Soft, NT, bowel sounds obese  MS: No edema, + peripheral pulses, FROM all 4 extremity

## 2019-04-29 NOTE — DISCHARGE NOTE NURSING/CASE MANAGEMENT/SOCIAL WORK - NSDCDPATPORTLINK_GEN_ALL_CORE
You can access the [facility] Patient Portal, offered by [facility], by registering with the following website: [email or web address]

## 2019-04-29 NOTE — CONSULT NOTE ADULT - SUBJECTIVE AND OBJECTIVE BOX
SARITA, EL  74y, Female  Allergy: No Known Allergies    CHIEF COMPLAINT cough and Le swelling (28 Apr 2019 12:02)    HPI:  75 yo lady with PMH of well controlled asthma, Venous insufficiency, HTN, OA, left ductal carcinoma S/P lumpectomy on April 3rd , complicated by anaphylaxis from anesthesia vs antibiotics given at that time presenting with a cough and LE swelling. Reports that for the last 10 days the patient was complaining of persistent nagging cough not relieved with cough medicine, was seen by Dr Ahn who gave her a course of Azithromycin and Prednisone for presumed URI with no major improvement . Denies any chest pain or SOB . Also states that she has worsening of her chronic bilateral LE edema , along with increased erythema in the left shin . No fever (27 Apr 2019 04:03)    FAMILY HISTORY:    PAST MEDICAL & SURGICAL HISTORY:  OA (osteoarthritis)  Breast cancer: LEFT  PVD (peripheral vascular disease)  HTN (hypertension)  Asthma: NEVER HAD ATTACK- ALLERGY INDUCED  S/P lumpectomy, left breast  H/O thyroidectomy: 2017      ROS  General: Denies fevers, chills, nightsweats, weight loss  HEENT: Denies headache, rhinorrhea, sore throat, eye pain  CV: Denies CP, palpitations  PULM: Denies SOB, cough  GI: Denies abdominal pain, diarrhea  : Denies dysuria, hematuria  MSK: Denies arthralgias  SKIN: Denies skin   NEURO: Denies paresthesias, weakness  PSYCH: Denies depression    VITALS:  T(F): 97.2, Max: 98.2 (04-28-19 @ 20:26)  HR: 63  BP: 95/54  RR: 18Vital Signs Last 24 Hrs  T(C): 36.2 (29 Apr 2019 05:22), Max: 36.8 (28 Apr 2019 20:26)  T(F): 97.2 (29 Apr 2019 05:22), Max: 98.2 (28 Apr 2019 20:26)  HR: 63 (29 Apr 2019 05:22) (63 - 85)  BP: 95/54 (29 Apr 2019 05:22) (95/54 - 115/56)  BP(mean): --  RR: 18 (29 Apr 2019 05:22) (18 - 18)  SpO2: --    PHYSICAL EXAM:  Gen: NAD, resting in bed  HEENT: Normocephalic, atraumatic  Neck: supple, no lymphadenopathy  CV: Regular rate & regular rhythm  Lungs: decreased BS at bases, no fremitus  Abdomen: Soft, BS present  Ext: Warm, well perfused  Neuro: non focal, awake  Skin: no rash, no erythema      TESTS & MEASUREMENTS:                        12.8   7.29  )-----------( 181      ( 27 Apr 2019 11:53 )             39.1     04-27    140  |  102  |  13  ----------------------------<  172<H>  4.6   |  24  |  0.7    Ca    8.9      27 Apr 2019 11:53    TPro  6.4  /  Alb  3.8  /  TBili  0.4  /  DBili  x   /  AST  21  /  ALT  14  /  AlkPhos  103  04-27      LIVER FUNCTIONS - ( 27 Apr 2019 11:53 )  Alb: 3.8 g/dL / Pro: 6.4 g/dL / ALK PHOS: 103 U/L / ALT: 14 U/L / AST: 21 U/L / GGT: x                 Lactate, Blood: 2.2 mmol/L (04-26-19 @ 17:32)      INFECTIOUS DISEASES TESTING    RADIOLOGY & ADDITIONAL TESTS:  I have personally reviewed the last Chest xray  CXR  Xray Chest 2 Views PA/Lat:   EXAM:  XR CHEST PA LAT 2V            PROCEDURE DATE:  04/26/2019      INTERPRETATION:  Clinical History / Reason for exam: Cough    Comparison : Chest radiograph April 4, 2019.    Technique/Positioning: PA and lateral.    Findings:    Supportdevices: None.    Cardiac/mediastinum/hilum: Unremarkable.    Lung parenchyma/Pleura: Bibasilar focal atelectasis..    Skeleton/soft tissues: Stable. Left neck base clip.    .   Impression:      Bibasilar focal atelectasis, decreased.    DHEERAJ RIVERA M.D., ATTENDING RADIOLOGIST  This document has been electronically signed. Apr 27 2019  9:05AM       (04-26-19 @ 20:25)    CARDIOLOGY TESTING  12 Lead ECG:   Ventricular Rate 69 BPM    Atrial Rate 69 BPM    P-R Interval 162 ms    QRS Duration 86 ms    Q-T Interval 412 ms    QTC Calculation(Bezet) 441 ms    P Axis 34 degrees    R Axis 1 degrees    T Axis 1 degrees    Diagnosis Line Normal sinus rhythm  Minimal voltage criteria for LVH, may be normal variant  T wave abnormality, consider inferior ischemia  Borderline ECG    Confirmed by Royzman, Fabian ([id number]) on 4/27/2019 7:56:50 AM (04-26-19 @ 18:54)      ANTIBIOTICS:  amoxicillin  875 milliGRAM(s)/clavulanate 1 Tablet(s) Oral every 12 hours      MEDICATIONS  amoxicillin  875 milliGRAM(s)/clavulanate 1  apixaban 10  atorvastatin 40  buDESOnide 160 MICROgram(s)/formoterol 4.5 MICROgram(s) Inhaler 2  chlorhexidine 4% Liquid 1  escitalopram 10  guaiFENesin/dextromethorphan  Syrup 10  hydrochlorothiazide 12.5  levothyroxine 150  losartan 50  montelukast 10  predniSONE   Tablet 40 SARITA, EL  74y, Female  Allergy: No Known Allergies    CHIEF COMPLAINT cough and Le swelling (28 Apr 2019 12:02)    HPI:  75 yo lady with PMH of well controlled asthma, Venous insufficiency, HTN, OA, left ductal carcinoma S/P lumpectomy on April 3rd , complicated by anaphylaxis from anesthesia vs antibiotics given at that time presenting with a cough and LE swelling. Reports that for the last 10 days the patient was complaining of persistent nagging cough not relieved with cough medicine, was seen by Dr Ahn who gave her a course of Azithromycin and Prednisone for presumed URI with no major improvement . Denies any chest pain or SOB . Also states that she has worsening of her chronic bilateral LE edema , along with increased erythema in the left shin . No fever (27 Apr 2019 04:03)    FAMILY HISTORY:    PAST MEDICAL & SURGICAL HISTORY:  OA (osteoarthritis)  Breast cancer: LEFT  PVD (peripheral vascular disease)  HTN (hypertension)  Asthma: NEVER HAD ATTACK- ALLERGY INDUCED  S/P lumpectomy, left breast  H/O thyroidectomy: 2017      ROS  General: Denies fevers, chills, nightsweats, weight loss  HEENT: Denies headache, rhinorrhea, sore throat, eye pain  CV: Denies CP, palpitations  PULM: Denies SOB, cough  GI: Denies abdominal pain, diarrhea  : Denies dysuria, hematuria  MSK: Denies arthralgias  SKIN: Denies skin   NEURO: Denies paresthesias, weakness  PSYCH: Denies depression    VITALS:  T(F): 97.2, Max: 98.2 (04-28-19 @ 20:26)  HR: 63  BP: 95/54  RR: 18Vital Signs Last 24 Hrs  T(C): 36.2 (29 Apr 2019 05:22), Max: 36.8 (28 Apr 2019 20:26)  T(F): 97.2 (29 Apr 2019 05:22), Max: 98.2 (28 Apr 2019 20:26)  HR: 63 (29 Apr 2019 05:22) (63 - 85)  BP: 95/54 (29 Apr 2019 05:22) (95/54 - 115/56)  BP(mean): --  RR: 18 (29 Apr 2019 05:22) (18 - 18)  SpO2: --    PHYSICAL EXAM:  Gen: NAD, lying in bed, pleasant and conversive   HEENT: Normocephalic, atraumatic  Neck: supple, no lymphadenopathy  CV: Regular rate & regular rhythm  Lungs: clear to auscultation bilaterally, no wheezing, rhonchi, rales   Abdomen: Soft, BS present  Ext: Bilateral lower extremities with erythema on anterior shin. RLE with noted darkening of skin from chronic venous insufficiency. LLE noted with some blistering. Bilateral LE warm to touch   Neuro: non focal, awake  Skin: erythema on bilateral lower extremities      TESTS & MEASUREMENTS:                        12.8   7.29  )-----------( 181      ( 27 Apr 2019 11:53 )             39.1     04-27    140  |  102  |  13  ----------------------------<  172<H>  4.6   |  24  |  0.7    Ca    8.9      27 Apr 2019 11:53    TPro  6.4  /  Alb  3.8  /  TBili  0.4  /  DBili  x   /  AST  21  /  ALT  14  /  AlkPhos  103  04-27      LIVER FUNCTIONS - ( 27 Apr 2019 11:53 )  Alb: 3.8 g/dL / Pro: 6.4 g/dL / ALK PHOS: 103 U/L / ALT: 14 U/L / AST: 21 U/L / GGT: x           Lactate, Blood: 2.2 mmol/L (04-26-19 @ 17:32)      INFECTIOUS DISEASES TESTING    RADIOLOGY & ADDITIONAL TESTS:  I have personally reviewed the last Chest xray  CXR  Xray Chest 2 Views PA/Lat:   EXAM:  XR CHEST PA LAT 2V          PROCEDURE DATE:  04/26/2019      INTERPRETATION:  Clinical History / Reason for exam: Cough    Comparison : Chest radiograph April 4, 2019.    Technique/Positioning: PA and lateral.    Findings:    Supportdevices: None.    Cardiac/mediastinum/hilum: Unremarkable.    Lung parenchyma/Pleura: Bibasilar focal atelectasis..    Skeleton/soft tissues: Stable. Left neck base clip.    .   Impression:      Bibasilar focal atelectasis, decreased.    DHEERAJ RIVERA M.D., ATTENDING RADIOLOGIST  This document has been electronically signed. Apr 27 2019  9:05AM       (04-26-19 @ 20:25)    CARDIOLOGY TESTING  12 Lead ECG:   Ventricular Rate 69 BPM    Atrial Rate 69 BPM    P-R Interval 162 ms    QRS Duration 86 ms    Q-T Interval 412 ms    QTC Calculation(Bezet) 441 ms    P Axis 34 degrees    R Axis 1 degrees    T Axis 1 degrees    Diagnosis Line Normal sinus rhythm  Minimal voltage criteria for LVH, may be normal variant  T wave abnormality, consider inferior ischemia  Borderline ECG    Confirmed by Fabian Wood ([id number]) on 4/27/2019 7:56:50 AM (04-26-19 @ 18:54)      ANTIBIOTICS:  amoxicillin  875 milliGRAM(s)/clavulanate 1 Tablet(s) Oral every 12 hours    MEDICATIONS  amoxicillin  875 milliGRAM(s)/clavulanate 1  apixaban 10  atorvastatin 40  buDESOnide 160 MICROgram(s)/formoterol 4.5 MICROgram(s) Inhaler 2  chlorhexidine 4% Liquid 1  escitalopram 10  guaiFENesin/dextromethorphan  Syrup 10  hydrochlorothiazide 12.5  levothyroxine 150  losartan 50  montelukast 10  predniSONE   Tablet 40 CYRUS STEINRED  74y, Female  Allergy: No Known Allergies    CHIEF COMPLAINT cough and Le swelling (28 Apr 2019 12:02)    HPI:  75 yo lady with PMH of well controlled asthma, Venous insufficiency, HTN, OA, left ductal carcinoma S/P lumpectomy on April 3rd , complicated by anaphylaxis from anesthesia vs antibiotics given at that time presenting with a cough and LE swelling. Reports that for the last 10 days the patient was complaining of persistent nagging cough not relieved with cough medicine, was seen by Dr Ahn who gave her a course of Azithromycin and Prednisone for presumed URI with no major improvement . Denies any chest pain or SOB . Also states that she has worsening of her chronic bilateral LE edema , along with increased erythema in the left shin . No fever (27 Apr 2019 04:03)    She was seen and examined while in bed this morning. The patient reported no acute complaints. Stated that her cough has resided for the most part. Photographs of her legs from Friday were provided by family member who was present at bedside and noted that the redness and blistering has improved since her presentation to the hospital. Remainder of her ten point review of systems was otherwise negative.     FAMILY HISTORY:    PAST MEDICAL & SURGICAL HISTORY:  OA (osteoarthritis)  Breast cancer: LEFT  PVD (peripheral vascular disease)  HTN (hypertension)  Asthma: NEVER HAD ATTACK- ALLERGY INDUCED  S/P lumpectomy, left breast  H/O thyroidectomy: 2017      ROS  General: Denies fevers, chills, nightsweats, weight loss  HEENT: Denies headache, rhinorrhea, sore throat, eye pain  CV: Denies CP, palpitations  PULM: Denies SOB, cough  GI: Denies abdominal pain, diarrhea  : Denies dysuria, hematuria  MSK: Denies arthralgias  SKIN: Denies skin   NEURO: Denies paresthesias, weakness  PSYCH: Denies depression    VITALS:  T(F): 97.2, Max: 98.2 (04-28-19 @ 20:26)  HR: 63  BP: 95/54  RR: 18Vital Signs Last 24 Hrs  T(C): 36.2 (29 Apr 2019 05:22), Max: 36.8 (28 Apr 2019 20:26)  T(F): 97.2 (29 Apr 2019 05:22), Max: 98.2 (28 Apr 2019 20:26)  HR: 63 (29 Apr 2019 05:22) (63 - 85)  BP: 95/54 (29 Apr 2019 05:22) (95/54 - 115/56)  BP(mean): --  RR: 18 (29 Apr 2019 05:22) (18 - 18)  SpO2: --    PHYSICAL EXAM:  Gen: NAD, lying in bed, pleasant and conversive   HEENT: Normocephalic, atraumatic  Neck: supple, no lymphadenopathy  CV: Regular rate & regular rhythm  Lungs: clear to auscultation bilaterally, no wheezing, rhonchi, rales   Abdomen: Soft, BS present  Ext: Bilateral lower extremities with erythema on anterior shin. RLE with noted darkening of skin from chronic venous insufficiency. LLE noted with some blistering. Bilateral LE warm to touch   Neuro: non focal, awake  Skin: erythema on bilateral lower extremities      TESTS & MEASUREMENTS:                        12.8   7.29  )-----------( 181      ( 27 Apr 2019 11:53 )             39.1     04-27    140  |  102  |  13  ----------------------------<  172<H>  4.6   |  24  |  0.7    Ca    8.9      27 Apr 2019 11:53    TPro  6.4  /  Alb  3.8  /  TBili  0.4  /  DBili  x   /  AST  21  /  ALT  14  /  AlkPhos  103  04-27      LIVER FUNCTIONS - ( 27 Apr 2019 11:53 )  Alb: 3.8 g/dL / Pro: 6.4 g/dL / ALK PHOS: 103 U/L / ALT: 14 U/L / AST: 21 U/L / GGT: x           Lactate, Blood: 2.2 mmol/L (04-26-19 @ 17:32)      INFECTIOUS DISEASES TESTING    RADIOLOGY & ADDITIONAL TESTS:  I have personally reviewed the last Chest xray  CXR  Xray Chest 2 Views PA/Lat:   EXAM:  XR CHEST PA LAT 2V          PROCEDURE DATE:  04/26/2019      INTERPRETATION:  Clinical History / Reason for exam: Cough    Comparison : Chest radiograph April 4, 2019.    Technique/Positioning: PA and lateral.    Findings:    Supportdevices: None.    Cardiac/mediastinum/hilum: Unremarkable.    Lung parenchyma/Pleura: Bibasilar focal atelectasis..    Skeleton/soft tissues: Stable. Left neck base clip.    .   Impression:      Bibasilar focal atelectasis, decreased.    DHEERAJ RIVERA M.D., ATTENDING RADIOLOGIST  This document has been electronically signed. Apr 27 2019  9:05AM       (04-26-19 @ 20:25)    CARDIOLOGY TESTING  12 Lead ECG:   Ventricular Rate 69 BPM    Atrial Rate 69 BPM    P-R Interval 162 ms    QRS Duration 86 ms    Q-T Interval 412 ms    QTC Calculation(Bezet) 441 ms    P Axis 34 degrees    R Axis 1 degrees    T Axis 1 degrees    Diagnosis Line Normal sinus rhythm  Minimal voltage criteria for LVH, may be normal variant  T wave abnormality, consider inferior ischemia  Borderline ECG    Confirmed by Fabian Wood ([id number]) on 4/27/2019 7:56:50 AM (04-26-19 @ 18:54)      ANTIBIOTICS:  amoxicillin  875 milliGRAM(s)/clavulanate 1 Tablet(s) Oral every 12 hours    MEDICATIONS  amoxicillin  875 milliGRAM(s)/clavulanate 1  apixaban 10  atorvastatin 40  buDESOnide 160 MICROgram(s)/formoterol 4.5 MICROgram(s) Inhaler 2  chlorhexidine 4% Liquid 1  escitalopram 10  guaiFENesin/dextromethorphan  Syrup 10  hydrochlorothiazide 12.5  levothyroxine 150  losartan 50  montelukast 10  predniSONE   Tablet 40 CYRUS STEINRED  74y, Female  Allergy: No Known Allergies    CHIEF COMPLAINT cough and Le swelling (28 Apr 2019 12:02)    HPI:  75 yo lady with PMH of well controlled asthma, Venous insufficiency, HTN, OA, left ductal carcinoma S/P lumpectomy on April 3rd , complicated by anaphylaxis from anesthesia vs antibiotics given at that time presenting with a cough and LE swelling. Reports that for the last 10 days the patient was complaining of persistent nagging cough not relieved with cough medicine, was seen by Dr Ahn who gave her a course of Azithromycin and Prednisone for presumed URI with no major improvement . Denies any chest pain or SOB . Also states that she has worsening of her chronic bilateral LE edema , along with increased erythema in the left shin . No fever (27 Apr 2019 04:03)    She was seen and examined while in bed this morning. The patient reported no acute complaints. Stated that her cough has resided for the most part. Photographs of her legs from Friday were provided by family member who was present at bedside and noted that the redness and blistering has improved since her presentation to the hospital. Remainder of her ten point review of systems was otherwise negative.     FAMILY HISTORY:    PAST MEDICAL & SURGICAL HISTORY:  OA (osteoarthritis)  Breast cancer: LEFT  PVD (peripheral vascular disease)  HTN (hypertension)  Asthma: NEVER HAD ATTACK- ALLERGY INDUCED  S/P lumpectomy, left breast  H/O thyroidectomy: 2017      ROS  General: Denies fevers, chills, nightsweats, weight loss  HEENT: Denies headache, rhinorrhea, sore throat, eye pain  CV: Denies CP, palpitations  PULM: Denies SOB, cough  GI: Denies abdominal pain, diarrhea  : Denies dysuria, hematuria  MSK: Denies arthralgias  SKIN: Denies skin   NEURO: Denies paresthesias, weakness  PSYCH: Denies depression    VITALS:  T(F): 97.2, Max: 98.2 (04-28-19 @ 20:26)  HR: 63  BP: 95/54  RR: 18Vital Signs Last 24 Hrs  T(C): 36.2 (29 Apr 2019 05:22), Max: 36.8 (28 Apr 2019 20:26)  T(F): 97.2 (29 Apr 2019 05:22), Max: 98.2 (28 Apr 2019 20:26)  HR: 63 (29 Apr 2019 05:22) (63 - 85)  BP: 95/54 (29 Apr 2019 05:22) (95/54 - 115/56)  BP(mean): --  RR: 18 (29 Apr 2019 05:22) (18 - 18)  SpO2: --    PHYSICAL EXAM:  Gen: NAD, lying in bed, pleasant and conversive   HEENT: Normocephalic, atraumatic  Neck: supple, no lymphadenopathy  CV: Regular rate & regular rhythm  Lungs: clear to auscultation bilaterally, no wheezing, rhonchi, rales   Abdomen: Soft, BS present  Ext: Bilateral lower extremities with erythema on anterior shin. RLE with noted darkening of skin from chronic venous insufficiency. LLE noted with erythema some blistering. Bilateral LE warm to touch   Neuro: non focal, awake  Skin: as above      TESTS & MEASUREMENTS:                        12.8   7.29  )-----------( 181      ( 27 Apr 2019 11:53 )             39.1     04-27    140  |  102  |  13  ----------------------------<  172<H>  4.6   |  24  |  0.7    Ca    8.9      27 Apr 2019 11:53    TPro  6.4  /  Alb  3.8  /  TBili  0.4  /  DBili  x   /  AST  21  /  ALT  14  /  AlkPhos  103  04-27      LIVER FUNCTIONS - ( 27 Apr 2019 11:53 )  Alb: 3.8 g/dL / Pro: 6.4 g/dL / ALK PHOS: 103 U/L / ALT: 14 U/L / AST: 21 U/L / GGT: x           Lactate, Blood: 2.2 mmol/L (04-26-19 @ 17:32)      INFECTIOUS DISEASES TESTING    RADIOLOGY & ADDITIONAL TESTS:  I have personally reviewed the last Chest xray  CXR  Xray Chest 2 Views PA/Lat:   EXAM:  XR CHEST PA LAT 2V          PROCEDURE DATE:  04/26/2019      INTERPRETATION:  Clinical History / Reason for exam: Cough    Comparison : Chest radiograph April 4, 2019.    Technique/Positioning: PA and lateral.    Findings:    Supportdevices: None.    Cardiac/mediastinum/hilum: Unremarkable.    Lung parenchyma/Pleura: Bibasilar focal atelectasis..    Skeleton/soft tissues: Stable. Left neck base clip.    .   Impression:      Bibasilar focal atelectasis, decreased.    DHEERAJ RIVERA M.D., ATTENDING RADIOLOGIST  This document has been electronically signed. Apr 27 2019  9:05AM       (04-26-19 @ 20:25)    CARDIOLOGY TESTING  12 Lead ECG:   Ventricular Rate 69 BPM    Atrial Rate 69 BPM    P-R Interval 162 ms    QRS Duration 86 ms    Q-T Interval 412 ms    QTC Calculation(Bezet) 441 ms    P Axis 34 degrees    R Axis 1 degrees    T Axis 1 degrees    Diagnosis Line Normal sinus rhythm  Minimal voltage criteria for LVH, may be normal variant  T wave abnormality, consider inferior ischemia  Borderline ECG    Confirmed by Fabian Wood ([id number]) on 4/27/2019 7:56:50 AM (04-26-19 @ 18:54)      ANTIBIOTICS:  amoxicillin  875 milliGRAM(s)/clavulanate 1 Tablet(s) Oral every 12 hours    MEDICATIONS  amoxicillin  875 milliGRAM(s)/clavulanate 1  apixaban 10  atorvastatin 40  buDESOnide 160 MICROgram(s)/formoterol 4.5 MICROgram(s) Inhaler 2  chlorhexidine 4% Liquid 1  escitalopram 10  guaiFENesin/dextromethorphan  Syrup 10  hydrochlorothiazide 12.5  levothyroxine 150  losartan 50  montelukast 10  predniSONE   Tablet 40

## 2019-04-29 NOTE — CONSULT NOTE ADULT - ASSESSMENT
Acute PE provoked (after recent lumpectomy)  Asthma exac improved  Cellulitis LE  HTN    agree with eliquis  dec prednisone 20 mg and cont for another 4 days  symbicrot bid  albuterol prn  abx per id (doxycycline 100 mg bid x 7 days  d/c hctz  d/w house staff  once stable patient to follow up with Dr abernathy within 1 week and myself after 2 weeks  d/w daughter at bed side

## 2019-04-29 NOTE — PROGRESS NOTE ADULT - SUBJECTIVE AND OBJECTIVE BOX
EL STEIN 74y Female  MRN#: [id number]         SUBJECTIVE  Patient is a 74y old Female who presents with a chief complaint of cough and Le swelling (29 Apr 2019 09:51)  Currently admitted to medicine with the primary diagnosis of Pulmonary embolism    Today is hospital day 2d, and this morning pt is resting comfortably. She reports improvement in her cough, and SOB, has no other complaints at this time.        OBJECTIVE  PAST MEDICAL & SURGICAL HISTORY  OA (osteoarthritis)  Breast cancer: LEFT  PVD (peripheral vascular disease)  HTN (hypertension)  Asthma: NEVER HAD ATTACK- ALLERGY INDUCED  S/P lumpectomy, left breast  H/O thyroidectomy: 2017    ALLERGIES:  No Known Allergies    MEDICATIONS:  STANDING MEDICATIONS  amoxicillin  875 milliGRAM(s)/clavulanate 1 Tablet(s) Oral every 12 hours  apixaban 10 milliGRAM(s) Oral every 12 hours  atorvastatin 40 milliGRAM(s) Oral at bedtime  buDESOnide 160 MICROgram(s)/formoterol 4.5 MICROgram(s) Inhaler 2 Puff(s) Inhalation two times a day  chlorhexidine 4% Liquid 1 Application(s) Topical <User Schedule>  escitalopram 10 milliGRAM(s) Oral daily  guaiFENesin/dextromethorphan  Syrup 10 milliLiter(s) Oral every 6 hours  hydrochlorothiazide 12.5 milliGRAM(s) Oral daily  levothyroxine 150 MICROGram(s) Oral daily  losartan 50 milliGRAM(s) Oral daily  montelukast 10 milliGRAM(s) Oral daily  predniSONE   Tablet 40 milliGRAM(s) Oral daily    PRN MEDICATIONS  ALBUTerol/ipratropium for Nebulization 3 milliLiter(s) Nebulizer every 4 hours PRN  zolpidem 10 milliGRAM(s) Oral at bedtime PRN      Home Medications:  calcitriol 0.5 mcg oral capsule: 1 cap(s) orally once a week (27 Apr 2019 04:13)  levothyroxine 150 mcg (0.15 mg) oral tablet: 1 tab(s) orally once a day (27 Apr 2019 04:13)  Lexapro 10 mg oral tablet: 1 tab(s) orally once a day (27 Apr 2019 04:13)  Lipitor 40 mg oral tablet: 1 tab(s) orally once a day (27 Apr 2019 04:13)  losartan-hydrochlorothiazide 50mg-12.5mg oral tablet: 1 tab(s) orally once a day (27 Apr 2019 04:13)  montelukast 10 mg oral tablet: 1 tab(s) orally once a day (27 Apr 2019 04:13)  Symbicort 160 mcg-4.5 mcg/inh inhalation aerosol: 2 puff(s) inhaled 2 times a day (27 Apr 2019 04:13)  zolpidem 10 mg oral tablet: 1 tab(s) orally once a day (at bedtime), As Needed (27 Apr 2019 04:13)      VITAL SIGNS: Last 24 Hours  T(C): 36.2 (29 Apr 2019 05:22), Max: 36.8 (28 Apr 2019 20:26)  T(F): 97.2 (29 Apr 2019 05:22), Max: 98.2 (28 Apr 2019 20:26)  HR: 63 (29 Apr 2019 05:22) (63 - 85)  BP: 95/54 (29 Apr 2019 05:22) (95/54 - 115/56)  BP(mean): --  RR: 18 (29 Apr 2019 05:22) (18 - 18)  SpO2: --    LABS:                        12.8   7.29  )-----------( 181      ( 27 Apr 2019 11:53 )             39.1     04-27    140  |  102  |  13  ----------------------------<  172<H>  4.6   |  24  |  0.7    Ca    8.9      27 Apr 2019 11:53    TPro  6.4  /  Alb  3.8  /  TBili  0.4  /  DBili  x   /  AST  21  /  ALT  14  /  AlkPhos  103  04-27          PHYSICAL EXAM:    General: Not in distress. appears comfortable  HEENT: Moist mucus membranes. PERRLA.  Cardio: Regular rate and rhythm, S1, S2, no murmurs  Pulm: Clear to auscultation bilaterally. No wheezing, or rhonchi  Abdomen: Soft, non-tender, non-distended.   Extremities: Chronic venous changes b/l; RLE erythema, warmth  Neuro: A&O x3. No focal deficits.       RADIOLOGY:        	< from: CT Angio Chest w/ IV Cont (04.27.19 @ 01:14) >    	Subsegmental pulmonary emboli in the right lower lobe as described. No CT   	evidence of right heart strain.    	Asymmetric left breast soft tissue density. Correlate with known history   	of breast cancer.    < from: [facility] Duplex Lower Ext Vein Scan, Bilat (04.26.19 @ 18:08) >  Impression:    No evidence of deep venous thrombosis or superficial thrombophlebitis in   bilateral lower extremities.    < end of copied text >

## 2019-04-30 LAB
ALBUMIN SERPL ELPH-MCNC: 3.1 G/DL — LOW (ref 3.5–5.2)
ALP SERPL-CCNC: 84 U/L — SIGNIFICANT CHANGE UP (ref 30–115)
ALT FLD-CCNC: 12 U/L — SIGNIFICANT CHANGE UP (ref 0–41)
ANION GAP SERPL CALC-SCNC: 11 MMOL/L — SIGNIFICANT CHANGE UP (ref 7–14)
AST SERPL-CCNC: 10 U/L — SIGNIFICANT CHANGE UP (ref 0–41)
BASOPHILS # BLD AUTO: 0.01 K/UL — SIGNIFICANT CHANGE UP (ref 0–0.2)
BASOPHILS NFR BLD AUTO: 0.1 % — SIGNIFICANT CHANGE UP (ref 0–1)
BILIRUB SERPL-MCNC: 0.3 MG/DL — SIGNIFICANT CHANGE UP (ref 0.2–1.2)
BUN SERPL-MCNC: 14 MG/DL — SIGNIFICANT CHANGE UP (ref 10–20)
CALCIUM SERPL-MCNC: 8.4 MG/DL — LOW (ref 8.5–10.1)
CHLORIDE SERPL-SCNC: 105 MMOL/L — SIGNIFICANT CHANGE UP (ref 98–110)
CO2 SERPL-SCNC: 27 MMOL/L — SIGNIFICANT CHANGE UP (ref 17–32)
CREAT SERPL-MCNC: 0.6 MG/DL — LOW (ref 0.7–1.5)
EOSINOPHIL # BLD AUTO: 0.05 K/UL — SIGNIFICANT CHANGE UP (ref 0–0.7)
EOSINOPHIL NFR BLD AUTO: 0.6 % — SIGNIFICANT CHANGE UP (ref 0–8)
GLUCOSE SERPL-MCNC: 93 MG/DL — SIGNIFICANT CHANGE UP (ref 70–99)
HCT VFR BLD CALC: 34.9 % — LOW (ref 37–47)
HGB BLD-MCNC: 11.4 G/DL — LOW (ref 12–16)
IMM GRANULOCYTES NFR BLD AUTO: 0.6 % — HIGH (ref 0.1–0.3)
INR BLD: 1.44 RATIO — HIGH (ref 0.65–1.3)
LYMPHOCYTES # BLD AUTO: 1.23 K/UL — SIGNIFICANT CHANGE UP (ref 1.2–3.4)
LYMPHOCYTES # BLD AUTO: 15.4 % — LOW (ref 20.5–51.1)
MAGNESIUM SERPL-MCNC: 2 MG/DL — SIGNIFICANT CHANGE UP (ref 1.8–2.4)
MCHC RBC-ENTMCNC: 30 PG — SIGNIFICANT CHANGE UP (ref 27–31)
MCHC RBC-ENTMCNC: 32.7 G/DL — SIGNIFICANT CHANGE UP (ref 32–37)
MCV RBC AUTO: 91.8 FL — SIGNIFICANT CHANGE UP (ref 81–99)
MONOCYTES # BLD AUTO: 0.48 K/UL — SIGNIFICANT CHANGE UP (ref 0.1–0.6)
MONOCYTES NFR BLD AUTO: 6 % — SIGNIFICANT CHANGE UP (ref 1.7–9.3)
NEUTROPHILS # BLD AUTO: 6.17 K/UL — SIGNIFICANT CHANGE UP (ref 1.4–6.5)
NEUTROPHILS NFR BLD AUTO: 77.3 % — HIGH (ref 42.2–75.2)
NRBC # BLD: 0 /100 WBCS — SIGNIFICANT CHANGE UP (ref 0–0)
PLATELET # BLD AUTO: 154 K/UL — SIGNIFICANT CHANGE UP (ref 130–400)
POTASSIUM SERPL-MCNC: 3.7 MMOL/L — SIGNIFICANT CHANGE UP (ref 3.5–5)
POTASSIUM SERPL-SCNC: 3.7 MMOL/L — SIGNIFICANT CHANGE UP (ref 3.5–5)
PROT SERPL-MCNC: 5.1 G/DL — LOW (ref 6–8)
PROTHROM AB SERPL-ACNC: 16.5 SEC — HIGH (ref 9.95–12.87)
RBC # BLD: 3.8 M/UL — LOW (ref 4.2–5.4)
RBC # FLD: 12.3 % — SIGNIFICANT CHANGE UP (ref 11.5–14.5)
SODIUM SERPL-SCNC: 143 MMOL/L — SIGNIFICANT CHANGE UP (ref 135–146)
WBC # BLD: 7.99 K/UL — SIGNIFICANT CHANGE UP (ref 4.8–10.8)
WBC # FLD AUTO: 7.99 K/UL — SIGNIFICANT CHANGE UP (ref 4.8–10.8)

## 2019-04-30 PROCEDURE — 71045 X-RAY EXAM CHEST 1 VIEW: CPT | Mod: 26

## 2019-04-30 RX ADMIN — ATORVASTATIN CALCIUM 40 MILLIGRAM(S): 80 TABLET, FILM COATED ORAL at 21:47

## 2019-04-30 RX ADMIN — Medication 10 MILLILITER(S): at 11:10

## 2019-04-30 RX ADMIN — Medication 30 MILLIGRAM(S): at 05:06

## 2019-04-30 RX ADMIN — BUDESONIDE AND FORMOTEROL FUMARATE DIHYDRATE 2 PUFF(S): 160; 4.5 AEROSOL RESPIRATORY (INHALATION) at 08:47

## 2019-04-30 RX ADMIN — MONTELUKAST 10 MILLIGRAM(S): 4 TABLET, CHEWABLE ORAL at 11:09

## 2019-04-30 RX ADMIN — APIXABAN 10 MILLIGRAM(S): 2.5 TABLET, FILM COATED ORAL at 05:06

## 2019-04-30 RX ADMIN — Medication 1 TABLET(S): at 05:06

## 2019-04-30 RX ADMIN — Medication 10 MILLILITER(S): at 05:06

## 2019-04-30 RX ADMIN — Medication 10 MILLILITER(S): at 17:10

## 2019-04-30 RX ADMIN — APIXABAN 10 MILLIGRAM(S): 2.5 TABLET, FILM COATED ORAL at 17:09

## 2019-04-30 RX ADMIN — BUDESONIDE AND FORMOTEROL FUMARATE DIHYDRATE 2 PUFF(S): 160; 4.5 AEROSOL RESPIRATORY (INHALATION) at 21:47

## 2019-04-30 RX ADMIN — Medication 100 MILLIGRAM(S): at 17:10

## 2019-04-30 RX ADMIN — ESCITALOPRAM OXALATE 10 MILLIGRAM(S): 10 TABLET, FILM COATED ORAL at 11:09

## 2019-04-30 RX ADMIN — Medication 10 MILLILITER(S): at 23:03

## 2019-04-30 RX ADMIN — Medication 150 MICROGRAM(S): at 05:06

## 2019-04-30 RX ADMIN — LOSARTAN POTASSIUM 50 MILLIGRAM(S): 100 TABLET, FILM COATED ORAL at 05:06

## 2019-04-30 RX ADMIN — Medication 100 MILLIGRAM(S): at 08:47

## 2019-04-30 NOTE — PROGRESS NOTE ADULT - ASSESSMENT
73 yo lady with PMH of well controlled asthma, Venous insufficiency, HTN, OA, left ductal carcinoma S/P lumpectomy presenting for 10 days hx of cough , along with worsening LE swelling and erythema    1) RLL Subsegmental PE w/  evidence of Right heart strain     - CE x3 -ve  - Currently on Apixaban 10mg q12hr. Switch to 5mg q12hr after completion of 14 doses  Decrease Steroids to prednisone 20mg and continue for 4 more days  Outpt f/u with Dr. Lo within 1 week and Dr. Hickey within 2 weeks of discharge    2)Dry Cough - improved    Likely hyperactive airway disease   - Prednisone taper    3) B/L LE erythema, warmth likely secondary to chronic venous insufficiency vs cellulitis  - Pt afebrile, no leukocytosis  - Switch Augmentin to Doxycyline 100mg po BID to complete 7 day course of treatment (last day 05/02)    VA Duplex: Negative       4)Left ducal Ca S/P lumpectomy with clear axillary LN      Evidence of density on left breast      Likely post surgical     #DVT px: Eliquis PO   #GI px: none  Dispo: possible candidate for 4A Rehab

## 2019-04-30 NOTE — CONSULT NOTE ADULT - ASSESSMENT
IMPRESSION: Rehab of debility secondary to PE, chronic venous insuff.    PRECAUTIONS: [x  ] Cardiac  [  ] Respiratory  [  ] Seizures [  ] Contact Isolation  [  ] Droplet Isolation  [  ] Other    Weight Bearing Status:     RECOMMENDATION:    Out of Bed to Chair     DVT/Decubiti Prophylaxis    REHAB PLAN:     [xx   ] Bedside P/T 3-5 times a week   [   ]   Bedside O/T  2-3 times a week             [   ] No Rehab Therapy Indicated                   [   ]  Speech Therapy   Conditioning/ROM                                    ADL  Bed Mobility                                               Conditioning/ROM  Transfers                                                     Bed Mobility  Sitting /Standing Balance                         Transfers                                        Gait Training                                               Sitting/Standing Balance  Stair Training [   ]Applicable                    Home equipment Eval                                                                        Splinting  [   ] Only      GOALS:   ADL   [ x  ]   Independent                    Transfers  [  x ] Independent                          Ambulation  [  x ] Independent     [  x  ] With device                            [   ]  CG                                                         [   ]  CG                                                                  [   ] CG                            [    ] Min A                                                   [   ] Min A                                                              [   ] Min  A          DISCHARGE PLAN:   [   ]  Good candidate for Intensive Rehabilitation/Hospital based-4A [facility]                                             Will tolerate 3hrs Intensive Rehab Daily                                       [    ]  Short Term Rehab in Skilled Nursing Facility                                       [    ]  Home with Outpatient or VN services                                         [  xx  ]  Possible Candidate for Intensive Hospital based Rehab

## 2019-04-30 NOTE — PROGRESS NOTE ADULT - ASSESSMENT
Acute PE provoked (after recent lumpectomy)  Asthma exac improved  Cellulitis LE  HTN    cont eliquis 10 mg q 12 hours x 7 days then 5 mg q 12hours  prednisone 20 mg and cont for another 3days  symbicrot bid  albuterol prn  abx per id (doxycycline 100 mg bid x 7 days  oob- chair  pt/rehab  once stable patient to follow up with Dr abernathy within 1 week and myself after 2 weeks  d/w daughter at bed side

## 2019-04-30 NOTE — PROGRESS NOTE ADULT - SUBJECTIVE AND OBJECTIVE BOX
Patient was seen and examined. Spoke with RN. Chart reviewed.  comf, dizzy on ambuklation  d/w ho  oob  check orthostatics    No events overnight.  Vital Signs Last 24 Hrs  T(F): 96.9 (30 Apr 2019 12:39), Max: 97.8 (30 Apr 2019 05:07)  HR: 80 (30 Apr 2019 12:39) (77 - 86)  BP: 119/57 (30 Apr 2019 12:39) (119/57 - 124/82)  SpO2: 98% (29 Apr 2019 21:02) (98% - 98%)  MEDICATIONS  (STANDING):  apixaban 10 milliGRAM(s) Oral every 12 hours  atorvastatin 40 milliGRAM(s) Oral at bedtime  buDESOnide 160 MICROgram(s)/formoterol 4.5 MICROgram(s) Inhaler 2 Puff(s) Inhalation two times a day  chlorhexidine 4% Liquid 1 Application(s) Topical <User Schedule>  doxycycline hyclate Capsule 100 milliGRAM(s) Oral every 12 hours  escitalopram 10 milliGRAM(s) Oral daily  guaiFENesin/dextromethorphan  Syrup 10 milliLiter(s) Oral every 6 hours  levothyroxine 150 MICROGram(s) Oral daily  losartan 50 milliGRAM(s) Oral daily  montelukast 10 milliGRAM(s) Oral daily    MEDICATIONS  (PRN):  ALBUTerol/ipratropium for Nebulization 3 milliLiter(s) Nebulizer every 4 hours PRN Shortness of Breath and/or Wheezing  zolpidem 10 milliGRAM(s) Oral at bedtime PRN Insomnia    Labs:                        11.4   7.99  )-----------( 154      ( 30 Apr 2019 06:47 )             34.9     30 Apr 2019 06:47    143    |  105    |  14     ----------------------------<  93     3.7     |  27     |  0.6      Ca    8.4        30 Apr 2019 06:47  Mg     2.0       30 Apr 2019 06:47    TPro  5.1    /  Alb  3.1    /  TBili  0.3    /  DBili  x      /  AST  10     /  ALT  12     /  AlkPhos  84     30 Apr 2019 06:47    PT/INR - ( 30 Apr 2019 06:47 )   PT: 16.50 sec;   INR: 1.44 ratio                 Radiology:    General: comfortable, NAD  Neurology: A&Ox3, nonfocal  Head:  Normocephalic, atraumatic  ENT:  Mucosa moist, no ulcerations  Neck:  Supple, no JVD,   Skin: no breakdown  Resp: CTA B/L  CV: RRR, S1S2,   GI: Soft, NT, bowel sounds  MS: chr stasis changes/ edema, + peripheral pulses, FROM all 4 extremity

## 2019-04-30 NOTE — PROGRESS NOTE ADULT - SUBJECTIVE AND OBJECTIVE BOX
EL STEIN  74y, Female  Allergy: No Known Allergies      LAST 24-Hr EVENTS:  no shortness of breath  slight dizziness yesterday    VITALS:  T(F): 97.8 (04-30-19 @ 05:07), Max: 98 (04-29-19 @ 14:45)  HR: 77 (04-30-19 @ 05:07)  BP: 121/85 (04-30-19 @ 05:07) (121/85 - 136/93)  RR: 18 (04-30-19 @ 05:07)  SpO2: 98% (04-29-19 @ 21:02)    PHYSICAL EXAM:    GENERAL: NAD  NECK: Supple, No JVD  CHEST/LUNG: CTA b/l  HEART: Regular rate and rhythm  ABDOMEN: Soft, Nontender, Nondistended  EXTREMITIES:  No clubbing, edema absent        TESTS & MEASUREMENTS:                          11.4   7.99  )-----------( 154      ( 30 Apr 2019 06:47 )             34.9     PT/INR - ( 30 Apr 2019 06:47 )   PT: 16.50 sec;   INR: 1.44 ratio           04-30    143  |  105  |  14  ----------------------------<  93  3.7   |  27  |  0.6<L>    Ca    8.4<L>      30 Apr 2019 06:47  Mg     2.0     04-30    TPro  5.1<L>  /  Alb  3.1<L>  /  TBili  0.3  /  DBili  x   /  AST  10  /  ALT  12  /  AlkPhos  84  04-30    LIVER FUNCTIONS - ( 30 Apr 2019 06:47 )  Alb: 3.1 g/dL / Pro: 5.1 g/dL / ALK PHOS: 84 U/L / ALT: 12 U/L / AST: 10 U/L / GGT: x                   MEDICATIONS:  MEDICATIONS  (STANDING):  apixaban 10 milliGRAM(s) Oral every 12 hours  atorvastatin 40 milliGRAM(s) Oral at bedtime  buDESOnide 160 MICROgram(s)/formoterol 4.5 MICROgram(s) Inhaler 2 Puff(s) Inhalation two times a day  chlorhexidine 4% Liquid 1 Application(s) Topical <User Schedule>  doxycycline hyclate Capsule 100 milliGRAM(s) Oral every 12 hours  escitalopram 10 milliGRAM(s) Oral daily  guaiFENesin/dextromethorphan  Syrup 10 milliLiter(s) Oral every 6 hours  levothyroxine 150 MICROGram(s) Oral daily  losartan 50 milliGRAM(s) Oral daily  montelukast 10 milliGRAM(s) Oral daily    MEDICATIONS  (PRN):  ALBUTerol/ipratropium for Nebulization 3 milliLiter(s) Nebulizer every 4 hours PRN Shortness of Breath and/or Wheezing  zolpidem 10 milliGRAM(s) Oral at bedtime PRN Insomnia

## 2019-04-30 NOTE — CONSULT NOTE ADULT - SUBJECTIVE AND OBJECTIVE BOX
HPI:  75 yo lady with PMH of well controlled asthma, Venous insufficiency, HTN, OA, left ductal carcinoma S/P lumpectomy on April 3rd , complicated by anaphylaxis from anesthesia vs antibiotics given at that time presenting for the above CC. for the last 10 days the patient was complaining of persistent nagging cough not relieved with cough medicine , was seen by Dr Ahn who gave her a course of Azithromycin and Prednisone for presumed URI with no major improvement . Denies any chest pain or SOB . Also states that she has worsening of her chronic bilateral LE edema , along with increased erythema in the left shin . No fever (27 Apr 2019 04:03)      PAST MEDICAL & SURGICAL HISTORY:  OA (osteoarthritis)  Breast cancer: LEFT  PVD (peripheral vascular disease)  HTN (hypertension)  Asthma: NEVER HAD ATTACK- ALLERGY INDUCED  S/P lumpectomy, left breast  H/O thyroidectomy: 2017      Hospital Course:  She is deconditoned and weak. She was diagnosed with PE. She lives alone.  TODAY'S SUBJECTIVE & REVIEW OF SYMPTOMS:     Constitutional WNL   Cardio WNL   Resp WNL   GI WNL  Heme WNL  Endo WNL  Skin WNL  MSK WNL  Neuro WNL  Cognitive WNL  Psych WNL      MEDICATIONS  (STANDING):  apixaban 10 milliGRAM(s) Oral every 12 hours  atorvastatin 40 milliGRAM(s) Oral at bedtime  buDESOnide 160 MICROgram(s)/formoterol 4.5 MICROgram(s) Inhaler 2 Puff(s) Inhalation two times a day  chlorhexidine 4% Liquid 1 Application(s) Topical <User Schedule>  doxycycline hyclate Capsule 100 milliGRAM(s) Oral every 12 hours  escitalopram 10 milliGRAM(s) Oral daily  guaiFENesin/dextromethorphan  Syrup 10 milliLiter(s) Oral every 6 hours  levothyroxine 150 MICROGram(s) Oral daily  losartan 50 milliGRAM(s) Oral daily  montelukast 10 milliGRAM(s) Oral daily    MEDICATIONS  (PRN):  ALBUTerol/ipratropium for Nebulization 3 milliLiter(s) Nebulizer every 4 hours PRN Shortness of Breath and/or Wheezing  zolpidem 10 milliGRAM(s) Oral at bedtime PRN Insomnia      FAMILY HISTORY:      Allergies    No Known Allergies    Intolerances        SOCIAL HISTORY:    [  ] Etoh  [  ] Smoking  [  ] Substance abuse     Home Environment:  [ x ] Home Alone  [  ] Lives with Family  [  ] Home Health Aid    Dwelling:  [  ] Apartment  [  ] Private House  [  ] Adult Home  [  ] Skilled Nursing Facility      [  ] Short Term  [  ] Long Term  [x  ] Stairs-3 to enter, 15 inside       Elevator [  ]    FUNCTIONAL STATUS PTA: (Check all that apply)  Ambulation: [   ]Independent    [  ] Dependent     [  ] Non-Ambulatory  Assistive Device: [  ] SA Cane  [  ]  Q Cane  [  ] Walker  [  ]  Wheelchair  ADL : [  ] Independent  [  ]  Dependent       Vital Signs Last 24 Hrs  T(C): 36.6 (30 Apr 2019 05:07), Max: 36.7 (29 Apr 2019 14:45)  T(F): 97.8 (30 Apr 2019 05:07), Max: 98 (29 Apr 2019 14:45)  HR: 77 (30 Apr 2019 05:07) (77 - 86)  BP: 121/85 (30 Apr 2019 05:07) (121/85 - 136/93)  BP(mean): --  RR: 18 (30 Apr 2019 05:07) (18 - 18)  SpO2: 98% (29 Apr 2019 21:02) (98% - 98%)      PHYSICAL EXAM: Alert & Oriented X3  GENERAL: NAD, well-groomed, well-developed  HEAD:  Atraumatic, Normocephalic  EYES: EOMI, PERRLA, conjunctiva and sclera clear  NECK: Supple, No JVD, Normal thyroid  CHEST/LUNG: Clear to percussion bilaterally; No rales, rhonchi, wheezing, or rubs  HEART: Regular rate and rhythm; No murmurs, rubs, or gallops  ABDOMEN: Soft, Nontender, Nondistended; Bowel sounds present  EXTREMITIES:  2+ Peripheral Pulses, No clubbing, cyanosis, or edema    NERVOUS SYSTEM:  Cranial Nerves 2-12 intact [  ] Abnormal  [  ]  ROM: WFL all extremities [  ]  Abnormal [  ]  Motor Strength: WFL all extremities  [  ]  Abnormal [ x ] 5-/5  Sensation: intact to light touch [ x ] Abnormal [  ]  Reflexes: Symmetric [  ]  Abnormal [  ]    FUNCTIONAL STATUS:  Bed Mobility: Independent [  ]  Supervision [  ]  Needs Assistance [  ]  N/A [  ]  Transfers: Independent [  ]  Supervision [  ]  Needs Assistance [  ]  N/A [  ]   Ambulation: Independent [  ]  Supervision [  ]  Needs Assistance [  ]  N/A [  ]  ADL: Independent [  ] Requires Assistance [  ] N/A [  ]      LABS:                        11.4   7.99  )-----------( 154      ( 30 Apr 2019 06:47 )             34.9     04-30    143  |  105  |  14  ----------------------------<  93  3.7   |  27  |  0.6<L>    Ca    8.4<L>      30 Apr 2019 06:47  Mg     2.0     04-30    TPro  5.1<L>  /  Alb  3.1<L>  /  TBili  0.3  /  DBili  x   /  AST  10  /  ALT  12  /  AlkPhos  84  04-30    PT/INR - ( 30 Apr 2019 06:47 )   PT: 16.50 sec;   INR: 1.44 ratio               RADIOLOGY & ADDITIONAL STUDIES:    Assesment:

## 2019-04-30 NOTE — PROGRESS NOTE ADULT - SUBJECTIVE AND OBJECTIVE BOX
EL STEIN 74y Female  MRN#: [id number]         SUBJECTIVE  Patient is a 74y old Female who presents with a chief complaint of cough and Le swelling (30 Apr 2019 12:02)  Currently admitted to medicine with the primary diagnosis of Pulmonary embolism  .  Today is hospital day 3d, and this morning pt is resting comfortably. She reports feeling better today, with near resolution of her cough. Leg looks better.        OBJECTIVE  PAST MEDICAL & SURGICAL HISTORY  OA (osteoarthritis)  Breast cancer: LEFT  PVD (peripheral vascular disease)  HTN (hypertension)  Asthma: NEVER HAD ATTACK- ALLERGY INDUCED  S/P lumpectomy, left breast  H/O thyroidectomy: 2017    ALLERGIES:  No Known Allergies    MEDICATIONS:  STANDING MEDICATIONS  apixaban 10 milliGRAM(s) Oral every 12 hours  atorvastatin 40 milliGRAM(s) Oral at bedtime  buDESOnide 160 MICROgram(s)/formoterol 4.5 MICROgram(s) Inhaler 2 Puff(s) Inhalation two times a day  chlorhexidine 4% Liquid 1 Application(s) Topical <User Schedule>  doxycycline hyclate Capsule 100 milliGRAM(s) Oral every 12 hours  escitalopram 10 milliGRAM(s) Oral daily  guaiFENesin/dextromethorphan  Syrup 10 milliLiter(s) Oral every 6 hours  levothyroxine 150 MICROGram(s) Oral daily  losartan 50 milliGRAM(s) Oral daily  montelukast 10 milliGRAM(s) Oral daily    PRN MEDICATIONS  ALBUTerol/ipratropium for Nebulization 3 milliLiter(s) Nebulizer every 4 hours PRN  zolpidem 10 milliGRAM(s) Oral at bedtime PRN      Home Medications:  calcitriol 0.5 mcg oral capsule: 1 cap(s) orally once a week (27 Apr 2019 04:13)  levothyroxine 150 mcg (0.15 mg) oral tablet: 1 tab(s) orally once a day (27 Apr 2019 04:13)  Lexapro 10 mg oral tablet: 1 tab(s) orally once a day (27 Apr 2019 04:13)  Lipitor 40 mg oral tablet: 1 tab(s) orally once a day (27 Apr 2019 04:13)  losartan-hydrochlorothiazide 50mg-12.5mg oral tablet: 1 tab(s) orally once a day (27 Apr 2019 04:13)  montelukast 10 mg oral tablet: 1 tab(s) orally once a day (27 Apr 2019 04:13)  Symbicort 160 mcg-4.5 mcg/inh inhalation aerosol: 2 puff(s) inhaled 2 times a day (27 Apr 2019 04:13)  zolpidem 10 mg oral tablet: 1 tab(s) orally once a day (at bedtime), As Needed (27 Apr 2019 04:13)      VITAL SIGNS: Last 24 Hours  T(C): 36.1 (30 Apr 2019 12:39), Max: 36.7 (29 Apr 2019 14:45)  T(F): 96.9 (30 Apr 2019 12:39), Max: 98 (29 Apr 2019 14:45)  HR: 80 (30 Apr 2019 12:39) (77 - 86)  BP: 119/57 (30 Apr 2019 12:39) (119/57 - 136/93)  BP(mean): --  RR: 19 (30 Apr 2019 12:39) (18 - 19)  SpO2: 98% (29 Apr 2019 21:02) (98% - 98%)    LABS:                        11.4   7.99  )-----------( 154      ( 30 Apr 2019 06:47 )             34.9     04-30    143  |  105  |  14  ----------------------------<  93  3.7   |  27  |  0.6<L>    Ca    8.4<L>      30 Apr 2019 06:47  Mg     2.0     04-30    TPro  5.1<L>  /  Alb  3.1<L>  /  TBili  0.3  /  DBili  x   /  AST  10  /  ALT  12  /  AlkPhos  84  04-30    PT/INR - ( 30 Apr 2019 06:47 )   PT: 16.50 sec;   INR: 1.44 ratio                       I&O's Summary        PHYSICAL EXAM:    General: Not in distress.   HEENT: Moist mucus membranes. PERRLA.  Cardio: Regular rate and rhythm, S1, S2, no murmurs  Pulm: Decreased BS b/l.  Abdomen: Soft, non-tender, non-distended.   Extremities: No cyanosis or edema bilaterally.   Neuro: A&O x3. No focal deficits.       RADIOLOGY:      	< from: CT Angio Chest w/ IV Cont (04.27.19 @ 01:14) >    	Subsegmental pulmonary emboli in the right lower lobe as described. No CT   	evidence of right heart strain.    	Asymmetric left breast soft tissue density. Correlate with known history   	of breast cancer.    < from: [facility] Duplex Lower Ext Vein Scan, Bilat (04.26.19 @ 18:08) >  Impression:    No evidence of deep venous thrombosis or superficial thrombophlebitis in   bilateral lower extremities.    < end of copied text >

## 2019-04-30 NOTE — PROGRESS NOTE ADULT - ASSESSMENT
pe/right ventric strain  chr venous stasis  ductal ca s/p lumpectomy    pulm eval dr vanessa  po eliquis  rehab pt  poss 4A

## 2019-05-01 ENCOUNTER — TRANSCRIPTION ENCOUNTER (OUTPATIENT)
Age: 74
End: 2019-05-01

## 2019-05-01 LAB
ALBUMIN SERPL ELPH-MCNC: 3 G/DL — LOW (ref 3.5–5.2)
ALP SERPL-CCNC: 81 U/L — SIGNIFICANT CHANGE UP (ref 30–115)
ALT FLD-CCNC: 10 U/L — SIGNIFICANT CHANGE UP (ref 0–41)
ANION GAP SERPL CALC-SCNC: 10 MMOL/L — SIGNIFICANT CHANGE UP (ref 7–14)
AST SERPL-CCNC: 10 U/L — SIGNIFICANT CHANGE UP (ref 0–41)
BASOPHILS # BLD AUTO: 0.02 K/UL — SIGNIFICANT CHANGE UP (ref 0–0.2)
BASOPHILS NFR BLD AUTO: 0.3 % — SIGNIFICANT CHANGE UP (ref 0–1)
BILIRUB SERPL-MCNC: 0.3 MG/DL — SIGNIFICANT CHANGE UP (ref 0.2–1.2)
BUN SERPL-MCNC: 13 MG/DL — SIGNIFICANT CHANGE UP (ref 10–20)
CALCIUM SERPL-MCNC: 8.1 MG/DL — LOW (ref 8.5–10.1)
CHLORIDE SERPL-SCNC: 108 MMOL/L — SIGNIFICANT CHANGE UP (ref 98–110)
CO2 SERPL-SCNC: 25 MMOL/L — SIGNIFICANT CHANGE UP (ref 17–32)
CREAT SERPL-MCNC: 0.7 MG/DL — SIGNIFICANT CHANGE UP (ref 0.7–1.5)
EOSINOPHIL # BLD AUTO: 0.04 K/UL — SIGNIFICANT CHANGE UP (ref 0–0.7)
EOSINOPHIL NFR BLD AUTO: 0.6 % — SIGNIFICANT CHANGE UP (ref 0–8)
GLUCOSE SERPL-MCNC: 85 MG/DL — SIGNIFICANT CHANGE UP (ref 70–99)
HCT VFR BLD CALC: 34 % — LOW (ref 37–47)
HGB BLD-MCNC: 10.9 G/DL — LOW (ref 12–16)
IMM GRANULOCYTES NFR BLD AUTO: 0.5 % — HIGH (ref 0.1–0.3)
INR BLD: 1.19 RATIO — SIGNIFICANT CHANGE UP (ref 0.65–1.3)
LYMPHOCYTES # BLD AUTO: 1.84 K/UL — SIGNIFICANT CHANGE UP (ref 1.2–3.4)
LYMPHOCYTES # BLD AUTO: 28.1 % — SIGNIFICANT CHANGE UP (ref 20.5–51.1)
MAGNESIUM SERPL-MCNC: 1.9 MG/DL — SIGNIFICANT CHANGE UP (ref 1.8–2.4)
MCHC RBC-ENTMCNC: 29.9 PG — SIGNIFICANT CHANGE UP (ref 27–31)
MCHC RBC-ENTMCNC: 32.1 G/DL — SIGNIFICANT CHANGE UP (ref 32–37)
MCV RBC AUTO: 93.4 FL — SIGNIFICANT CHANGE UP (ref 81–99)
MONOCYTES # BLD AUTO: 0.49 K/UL — SIGNIFICANT CHANGE UP (ref 0.1–0.6)
MONOCYTES NFR BLD AUTO: 7.5 % — SIGNIFICANT CHANGE UP (ref 1.7–9.3)
NEUTROPHILS # BLD AUTO: 4.13 K/UL — SIGNIFICANT CHANGE UP (ref 1.4–6.5)
NEUTROPHILS NFR BLD AUTO: 63 % — SIGNIFICANT CHANGE UP (ref 42.2–75.2)
NRBC # BLD: 0 /100 WBCS — SIGNIFICANT CHANGE UP (ref 0–0)
PLATELET # BLD AUTO: 118 K/UL — LOW (ref 130–400)
POTASSIUM SERPL-MCNC: 3.7 MMOL/L — SIGNIFICANT CHANGE UP (ref 3.5–5)
POTASSIUM SERPL-SCNC: 3.7 MMOL/L — SIGNIFICANT CHANGE UP (ref 3.5–5)
PROT SERPL-MCNC: 4.9 G/DL — LOW (ref 6–8)
PROTHROM AB SERPL-ACNC: 13.7 SEC — HIGH (ref 9.95–12.87)
RBC # BLD: 3.64 M/UL — LOW (ref 4.2–5.4)
RBC # FLD: 12.8 % — SIGNIFICANT CHANGE UP (ref 11.5–14.5)
SODIUM SERPL-SCNC: 143 MMOL/L — SIGNIFICANT CHANGE UP (ref 135–146)
WBC # BLD: 6.55 K/UL — SIGNIFICANT CHANGE UP (ref 4.8–10.8)
WBC # FLD AUTO: 6.55 K/UL — SIGNIFICANT CHANGE UP (ref 4.8–10.8)

## 2019-05-01 RX ORDER — LOSARTAN POTASSIUM 100 MG/1
1 TABLET, FILM COATED ORAL
Qty: 30 | Refills: 0 | OUTPATIENT
Start: 2019-05-01 | End: 2019-05-30

## 2019-05-01 RX ORDER — APIXABAN 2.5 MG/1
2 TABLET, FILM COATED ORAL
Qty: 12 | Refills: 0 | OUTPATIENT
Start: 2019-05-01 | End: 2019-05-03

## 2019-05-01 RX ORDER — LOSARTAN/HYDROCHLOROTHIAZIDE 100MG-25MG
1 TABLET ORAL
Qty: 0 | Refills: 0 | COMMUNITY

## 2019-05-01 RX ADMIN — Medication 100 MILLIGRAM(S): at 05:48

## 2019-05-01 RX ADMIN — Medication 20 MILLIGRAM(S): at 05:48

## 2019-05-01 RX ADMIN — BUDESONIDE AND FORMOTEROL FUMARATE DIHYDRATE 2 PUFF(S): 160; 4.5 AEROSOL RESPIRATORY (INHALATION) at 08:43

## 2019-05-01 RX ADMIN — ESCITALOPRAM OXALATE 10 MILLIGRAM(S): 10 TABLET, FILM COATED ORAL at 11:46

## 2019-05-01 RX ADMIN — MONTELUKAST 10 MILLIGRAM(S): 4 TABLET, CHEWABLE ORAL at 11:47

## 2019-05-01 RX ADMIN — Medication 150 MICROGRAM(S): at 05:48

## 2019-05-01 RX ADMIN — ATORVASTATIN CALCIUM 40 MILLIGRAM(S): 80 TABLET, FILM COATED ORAL at 21:22

## 2019-05-01 RX ADMIN — APIXABAN 10 MILLIGRAM(S): 2.5 TABLET, FILM COATED ORAL at 17:47

## 2019-05-01 RX ADMIN — Medication 10 MILLILITER(S): at 05:48

## 2019-05-01 RX ADMIN — Medication 10 MILLILITER(S): at 11:50

## 2019-05-01 RX ADMIN — CHLORHEXIDINE GLUCONATE 1 APPLICATION(S): 213 SOLUTION TOPICAL at 05:47

## 2019-05-01 RX ADMIN — Medication 10 MILLILITER(S): at 17:47

## 2019-05-01 RX ADMIN — APIXABAN 10 MILLIGRAM(S): 2.5 TABLET, FILM COATED ORAL at 05:48

## 2019-05-01 RX ADMIN — LOSARTAN POTASSIUM 50 MILLIGRAM(S): 100 TABLET, FILM COATED ORAL at 05:48

## 2019-05-01 RX ADMIN — BUDESONIDE AND FORMOTEROL FUMARATE DIHYDRATE 2 PUFF(S): 160; 4.5 AEROSOL RESPIRATORY (INHALATION) at 21:22

## 2019-05-01 RX ADMIN — Medication 100 MILLIGRAM(S): at 17:47

## 2019-05-01 NOTE — DISCHARGE NOTE PROVIDER - NSDCCPCAREPLAN_GEN_ALL_CORE_FT
PRINCIPAL DISCHARGE DIAGNOSIS  Diagnosis: Pulmonary embolism  Assessment and Plan of Treatment: You were found to have a right lower lobe pulmonary embolism and was started on anticoagulation. Continue on Eliquis as prescribed.  F/u with Dr. Lo 1 weeka fter discharge, and w/ Dr. Hickey 2 weeks after discharge.      SECONDARY DISCHARGE DIAGNOSES  Diagnosis: Cellulitis  Assessment and Plan of Treatment: Continue the course of antibitoics as prescribed. Follow up with your PMD within 1 week of discharge. PRINCIPAL DISCHARGE DIAGNOSIS  Diagnosis: Pulmonary embolism  Assessment and Plan of Treatment: You were found to have a right lower lobe pulmonary embolism and was started on anticoagulation. Continue on Eliquis as prescribed.  F/u with Dr. Lo 1 weeka fter discharge, and w/ Dr. Hickey 2 weeks after discharge.      SECONDARY DISCHARGE DIAGNOSES  Diagnosis: Cellulitis  Assessment and Plan of Treatment: You have completed a course of antibiotics for your cellulitis. PRINCIPAL DISCHARGE DIAGNOSIS  Diagnosis: Pulmonary embolism  Assessment and Plan of Treatment: You were found to have a right lower lobe pulmonary embolism and was started on anticoagulation. Continue on Eliquis as prescribed.  F/u with Dr. Lo 1 weeka fter discharge, and w/ Dr. Hickey 2 weeks after discharge. Pt currently not receiving chemot      SECONDARY DISCHARGE DIAGNOSES  Diagnosis: Cellulitis  Assessment and Plan of Treatment: You have completed a course of antibiotics for your cellulitis.

## 2019-05-01 NOTE — DISCHARGE NOTE PROVIDER - NSDCFUSCHEDAPPT_GEN_ALL_CORE_FT
EL STEIN ; 05/01/2019 ; [facility] EL STEIN ; 05/01/2019 ; [facility]

## 2019-05-01 NOTE — PHYSICAL THERAPY INITIAL EVALUATION ADULT - GENERAL OBSERVATIONS, REHAB EVAL
10:36-11:12 Pt encountered supine in bed in NAD. + BLE edema with erythema to B distal 1/3 legs. Pt with no c/o offered. Daughter at bedside. Upon long sit at EOB pt reported dizziness: /88, HR 90 bpm SPO2 95-96% on room air, , seated at EOB: 123/67 HR 75 bpm, Standing : 175/72, HR 95 bpm. Dizziness subsided after transitioning to b/s chair.

## 2019-05-01 NOTE — PROGRESS NOTE ADULT - ASSESSMENT
dyspnea  cough  venous thromboembolic disease  asthma exacerbation  cellulitis left leg      continue full anticoagulation, apixaban 10mg bid for 7 days then 5mg bid for next 3 months  bronchodilators  prednisone 20mg daily for 3 days then stop  continue symbicort/montelukast  continue antibiotic  consider gi prophylaxis on steroid and anticoagulation  out of bed/physical therapy

## 2019-05-01 NOTE — PROGRESS NOTE ADULT - SUBJECTIVE AND OBJECTIVE BOX
Patient was seen and examined. Spoke with RN. Chart reviewed.  dizziness with ambulation  decrease losartan  hctz dc  check pulse ox with ambulation  anticipare dc to 4a in am  family at bedisde,    No events overnight.  Vital Signs Last 24 Hrs  T(F): 97.9 (01 May 2019 04:54), Max: 98.6 (30 Apr 2019 21:09)  HR: 71 (01 May 2019 04:54) (71 - 80)  BP: 116/68 (01 May 2019 04:54) (108/58 - 116/68)  SpO2: 98% (01 May 2019 09:35) (97% - 98%)  MEDICATIONS  (STANDING):  apixaban 10 milliGRAM(s) Oral every 12 hours  atorvastatin 40 milliGRAM(s) Oral at bedtime  buDESOnide 160 MICROgram(s)/formoterol 4.5 MICROgram(s) Inhaler 2 Puff(s) Inhalation two times a day  chlorhexidine 4% Liquid 1 Application(s) Topical <User Schedule>  doxycycline hyclate Capsule 100 milliGRAM(s) Oral every 12 hours  escitalopram 10 milliGRAM(s) Oral daily  guaiFENesin/dextromethorphan  Syrup 10 milliLiter(s) Oral every 6 hours  levothyroxine 150 MICROGram(s) Oral daily  losartan 50 milliGRAM(s) Oral daily  montelukast 10 milliGRAM(s) Oral daily  predniSONE   Tablet 20 milliGRAM(s) Oral daily    MEDICATIONS  (PRN):  ALBUTerol/ipratropium for Nebulization 3 milliLiter(s) Nebulizer every 4 hours PRN Shortness of Breath and/or Wheezing  zolpidem 10 milliGRAM(s) Oral at bedtime PRN Insomnia    Labs:                        10.9   6.55  )-----------( 118      ( 01 May 2019 07:21 )             34.0                         11.4   7.99  )-----------( 154      ( 30 Apr 2019 06:47 )             34.9     01 May 2019 07:21    143    |  108    |  13     ----------------------------<  85     3.7     |  25     |  0.7    30 Apr 2019 06:47    143    |  105    |  14     ----------------------------<  93     3.7     |  27     |  0.6      Ca    8.1        01 May 2019 07:21  Ca    8.4        30 Apr 2019 06:47  Mg     1.9       01 May 2019 07:21  Mg     2.0       30 Apr 2019 06:47    TPro  4.9    /  Alb  3.0    /  TBili  0.3    /  DBili  x      /  AST  10     /  ALT  10     /  AlkPhos  81     01 May 2019 07:21  TPro  5.1    /  Alb  3.1    /  TBili  0.3    /  DBili  x      /  AST  10     /  ALT  12     /  AlkPhos  84     30 Apr 2019 06:47    PT/INR - ( 01 May 2019 07:21 )   PT: 13.70 sec;   INR: 1.19 ratio                 Radiology:    General: comfortable, NAD  Neurology: A&Ox3, nonfocal  Head:  Normocephalic, atraumatic  ENT:  Mucosa moist, no ulcerations  Neck:  Supple, no JVD,   Skin: no breakdown  Resp: CTA B/L  CV: RRR, S1S2,   GI: Soft, NT, bowel sounds  MS: No edema, + peripheral pulses, FROM all 4 extremity

## 2019-05-01 NOTE — DISCHARGE NOTE PROVIDER - CARE PROVIDERS DIRECT ADDRESSES
,DirectAddress_Unknown ,DirectAddress_Unknown,[email or web address],DirectAddress_Unknown

## 2019-05-01 NOTE — PROGRESS NOTE ADULT - SUBJECTIVE AND OBJECTIVE BOX
EL STEIN  74y, Female  Allergy: No Known Allergies      LAST 24-Hr EVENTS:  much less cough; no shortness of breath; only complains of dizziness that seems exertional  VITALS:  T(F): 97.3 (05-01-19 @ 13:43), Max: 98.6 (04-30-19 @ 21:09)  HR: 80 (05-01-19 @ 13:43)  BP: 123/82 (05-01-19 @ 13:43) (108/58 - 123/82)  RR: 19 (05-01-19 @ 13:43)  SpO2: 98% (05-01-19 @ 09:35)room air    PHYSICAL EXAM:    GENERAL: NAD, well-developed  HEAD:  Atraumatic, Normocephalic  NECK: Supple, No JVD  CHEST/LUNG: Clear to auscultation bilaterally; No wheeze  HEART: Regular rate and rhythm; No murmurs, rubs, or gallops  ABDOMEN: Soft, Nontender, Nondistended; Bowel sounds present  EXTREMITIES:  2+ Peripheral Pulses, No clubbing, cyanosis, mild dependent edema        TESTS & MEASUREMENTS:                          10.9   6.55  )-----------( 118      ( 01 May 2019 07:21 )             34.0     PT/INR - ( 01 May 2019 07:21 )   PT: 13.70 sec;   INR: 1.19 ratio           05-01    143  |  108  |  13  ----------------------------<  85  3.7   |  25  |  0.7    Ca    8.1<L>      01 May 2019 07:21  Mg     1.9     05-01    TPro  4.9<L>  /  Alb  3.0<L>  /  TBili  0.3  /  DBili  x   /  AST  10  /  ALT  10  /  AlkPhos  81  05-01    LIVER FUNCTIONS - ( 01 May 2019 07:21 )  Alb: 3.0 g/dL / Pro: 4.9 g/dL / ALK PHOS: 81 U/L / ALT: 10 U/L / AST: 10 U/L / GGT: x                     ABG & VENT SETTINGS: (when applicable)    n/a    RADIOLOGY & ADDITIONAL TESTS:    MEDICATIONS:  MEDICATIONS  (STANDING):  apixaban 10 milliGRAM(s) Oral every 12 hours  atorvastatin 40 milliGRAM(s) Oral at bedtime  buDESOnide 160 MICROgram(s)/formoterol 4.5 MICROgram(s) Inhaler 2 Puff(s) Inhalation two times a day  chlorhexidine 4% Liquid 1 Application(s) Topical <User Schedule>  doxycycline hyclate Capsule 100 milliGRAM(s) Oral every 12 hours  escitalopram 10 milliGRAM(s) Oral daily  guaiFENesin/dextromethorphan  Syrup 10 milliLiter(s) Oral every 6 hours  levothyroxine 150 MICROGram(s) Oral daily  losartan 50 milliGRAM(s) Oral daily  montelukast 10 milliGRAM(s) Oral daily  predniSONE   Tablet 20 milliGRAM(s) Oral daily    MEDICATIONS  (PRN):  ALBUTerol/ipratropium for Nebulization 3 milliLiter(s) Nebulizer every 4 hours PRN Shortness of Breath and/or Wheezing  zolpidem 10 milliGRAM(s) Oral at bedtime PRN Insomnia

## 2019-05-01 NOTE — DISCHARGE NOTE PROVIDER - HOSPITAL COURSE
73 yo lady with PMH of well controlled asthma, Venous insufficiency, HTN, OA, left ductal carcinoma S/P lumpectomy presenting for 10 days hx of cough , along with worsening LE swelling and erythema        1) RLL Subsegmental PE w/  evidence of Right heart strain       - CE x3 -ve    - Currently on Apixaban 10mg q12hr until 05/04. Switch to 5mg q12hr after    Decrease Steroids to prednisone 20mg and continue for 3 more days    Outpt f/u with Dr. Lo within 1 week and Dr. Hickey within 2 weeks of discharge        2)Dry Cough - improved      Likely hyperactive airway disease     - Prednisone taper        3) B/L LE erythema, warmth likely secondary to chronic venous insufficiency vs cellulitis    - Pt afebrile, no leukocytosis    - Switch Augmentin to Doxycyline 100mg po BID to complete 7 day course of treatment (last day 05/02)      VA Duplex: Negative             4)Left ducal Ca S/P lumpectomy with clear axillary LN        Evidence of density on left breast        Likely post surgical         #DVT px: Eliquis PO     #GI px: none    Dispo: possible candidate for 4A Rehab 73 yo lady with PMH of well controlled asthma, Venous insufficiency, HTN, OA, left ductal carcinoma S/P lumpectomy presenting for 10 days hx of cough , along with worsening LE swelling and erythema        1) RLL Subsegmental PE w/  evidence of Right heart strain       - CE x3 -ve    - Currently on Apixaban 10mg q12hr until 05/04. Switch to 5mg q12hr after    Decrease Steroids to prednisone 20mg and continue for 3 more days    Outpt f/u with Dr. Lo within 1 week and Dr. Hickey within 2 weeks of discharge        2)Dry Cough - improved      Likely hyperactive airway disease     - Prednisone taper        3) B/L LE erythema, warmth likely secondary to chronic venous insufficiency vs cellulitis    - Pt afebrile, no leukocytosis    - Switch Augmentin to Doxycyline 100mg po BID to complete 7 day course of treatment (last day 05/02)      VA Duplex: Negative             4)Left ducal Ca S/P lumpectomy with clear axillary LN        Evidence of density on left breast        Likely post surgical     - PT currently not receiving chemotherapy/radiaton therapy        #DVT px: Eliquis PO     #GI px: none    Dispo: possible candidate for 4A Rehab

## 2019-05-01 NOTE — DISCHARGE NOTE PROVIDER - CARE PROVIDER_API CALL
Nallely Hickey)  Internal Medicine  [address] [place] [address]  Phone: [phone]  Fax: [phone]  Follow Up Time: Nallely Hickey)  Internal Medicine  [address] [place] [address]  Phone: [phone]  Fax: [phone]  Follow Up Time:     Fabian Wood)  Cardiovascular Disease; Internal Medicine; Interventional Cardiology  [address] [place] [address], [place] [address] [place] [address]  Phone: [phone]  Fax: [phone]  Follow Up Time:     Amrita Pantoja)  Cardiology; Internal Medicine; Nuclear Cardiology  [address] [place] [address], [address]  [place] [address]  Phone: [phone]  Fax: [phone]  Follow Up Time:

## 2019-05-02 VITALS
HEART RATE: 78 BPM | SYSTOLIC BLOOD PRESSURE: 117 MMHG | RESPIRATION RATE: 19 BRPM | DIASTOLIC BLOOD PRESSURE: 74 MMHG | OXYGEN SATURATION: 96 %

## 2019-05-02 RX ORDER — LOSARTAN POTASSIUM 100 MG/1
1 TABLET, FILM COATED ORAL
Qty: 30 | Refills: 0
Start: 2019-05-02 | End: 2019-05-31

## 2019-05-02 RX ORDER — APIXABAN 2.5 MG/1
2 TABLET, FILM COATED ORAL
Qty: 12 | Refills: 0 | OUTPATIENT
Start: 2019-05-02 | End: 2019-05-04

## 2019-05-02 RX ORDER — APIXABAN 2.5 MG/1
2 TABLET, FILM COATED ORAL
Qty: 12 | Refills: 0
Start: 2019-05-02 | End: 2019-05-04

## 2019-05-02 RX ADMIN — BUDESONIDE AND FORMOTEROL FUMARATE DIHYDRATE 2 PUFF(S): 160; 4.5 AEROSOL RESPIRATORY (INHALATION) at 07:37

## 2019-05-02 RX ADMIN — Medication 10 MILLILITER(S): at 00:03

## 2019-05-02 RX ADMIN — Medication 100 MILLIGRAM(S): at 05:23

## 2019-05-02 RX ADMIN — Medication 10 MILLILITER(S): at 05:23

## 2019-05-02 RX ADMIN — ESCITALOPRAM OXALATE 10 MILLIGRAM(S): 10 TABLET, FILM COATED ORAL at 12:35

## 2019-05-02 RX ADMIN — APIXABAN 10 MILLIGRAM(S): 2.5 TABLET, FILM COATED ORAL at 17:11

## 2019-05-02 RX ADMIN — LOSARTAN POTASSIUM 50 MILLIGRAM(S): 100 TABLET, FILM COATED ORAL at 05:22

## 2019-05-02 RX ADMIN — Medication 10 MILLILITER(S): at 12:35

## 2019-05-02 RX ADMIN — APIXABAN 10 MILLIGRAM(S): 2.5 TABLET, FILM COATED ORAL at 05:22

## 2019-05-02 RX ADMIN — MONTELUKAST 10 MILLIGRAM(S): 4 TABLET, CHEWABLE ORAL at 12:35

## 2019-05-02 RX ADMIN — CHLORHEXIDINE GLUCONATE 1 APPLICATION(S): 213 SOLUTION TOPICAL at 05:23

## 2019-05-02 RX ADMIN — Medication 20 MILLIGRAM(S): at 05:22

## 2019-05-02 RX ADMIN — Medication 150 MICROGRAM(S): at 05:22

## 2019-05-02 NOTE — PROGRESS NOTE ADULT - REASON FOR ADMISSION
cough and Le swelling

## 2019-05-02 NOTE — PROGRESS NOTE ADULT - SUBJECTIVE AND OBJECTIVE BOX
<<<RESIDENT DISCHARGE NOTE>>>     EL STEIN  MRN-[id number]    VITAL SIGNS:  T(F): 97.5 (05-02-19 @ 04:51), Max: 97.5 (05-02-19 @ 04:51)  HR: 78 (05-02-19 @ 08:00)  BP: 120/68 (05-02-19 @ 04:51)  SpO2: 96% (05-02-19 @ 08:00)      PHYSICAL EXAMINATION:  General: NAD. Pleasant  Head & Neck:CAROLIN  Pulmonary: B/l BS. no whezing or rhoncho  Cardiovascular: S1S2, no murmurs. regular rate and rhythm  Gastrointestinal/Abdomen & Pelvis: Soft, non-tender, non-distended  Neurologic/Motor: AAo x3    TEST RESULTS:                        10.9   6.55  )-----------( 118      ( 01 May 2019 07:21 )             34.0       05-01    143  |  108  |  13  ----------------------------<  85  3.7   |  25  |  0.7    Ca    8.1<L>      01 May 2019 07:21  Mg     1.9     05-01    TPro  4.9<L>  /  Alb  3.0<L>  /  TBili  0.3  /  DBili  x   /  AST  10  /  ALT  10  /  AlkPhos  81  05-01      FINAL DISCHARGE INTERVIEW:  Resident(s) Present: (Name: Dr. Argueta_), RN Present: (Name: Molly_)    DISCHARGE MEDICATION RECONCILIATION  reviewed with Attending (Name:Dr. Sanchez)    DISPOSITION:   [  ] Home,    [  ] Home with Visiting Nursing Services,   [   x ]  SNF/ NH,    [   ] Acute Rehab (4A),   [   ] Other (Specify:_________)

## 2019-05-02 NOTE — PROGRESS NOTE ADULT - ASSESSMENT
pe/right ventric strain  chr venous stasis  ductal ca s/p lumpectomy      hctz held  losartan decreased  ambulate and check pulseox  continue eliquis      dc to str/snf

## 2019-05-02 NOTE — PROGRESS NOTE ADULT - SUBJECTIVE AND OBJECTIVE BOX
Patient was seen and examined. Spoke with RN. Chart reviewed.  daughter at bedside,  pt feels better  no lightheadedness,  for dc to snf/str    No events overnight.  Vital Signs Last 24 Hrs  T(F): 97.5 (02 May 2019 04:51), Max: 97.5 (02 May 2019 04:51)  HR: 78 (02 May 2019 08:00) (73 - 81)  BP: 120/68 (02 May 2019 04:51) (120/68 - 123/82)  SpO2: 96% (02 May 2019 08:00) (96% - 96%)  MEDICATIONS  (STANDING):  apixaban 10 milliGRAM(s) Oral every 12 hours  atorvastatin 40 milliGRAM(s) Oral at bedtime  buDESOnide 160 MICROgram(s)/formoterol 4.5 MICROgram(s) Inhaler 2 Puff(s) Inhalation two times a day  chlorhexidine 4% Liquid 1 Application(s) Topical <User Schedule>  escitalopram 10 milliGRAM(s) Oral daily  guaiFENesin/dextromethorphan  Syrup 10 milliLiter(s) Oral every 6 hours  levothyroxine 150 MICROGram(s) Oral daily  losartan 50 milliGRAM(s) Oral daily  montelukast 10 milliGRAM(s) Oral daily  predniSONE   Tablet 20 milliGRAM(s) Oral daily    MEDICATIONS  (PRN):  ALBUTerol/ipratropium for Nebulization 3 milliLiter(s) Nebulizer every 4 hours PRN Shortness of Breath and/or Wheezing  zolpidem 10 milliGRAM(s) Oral at bedtime PRN Insomnia    Labs:                        10.9   6.55  )-----------( 118      ( 01 May 2019 07:21 )             34.0     01 May 2019 07:21    143    |  108    |  13     ----------------------------<  85     3.7     |  25     |  0.7      Ca    8.1        01 May 2019 07:21  Mg     1.9       01 May 2019 07:21    TPro  4.9    /  Alb  3.0    /  TBili  0.3    /  DBili  x      /  AST  10     /  ALT  10     /  AlkPhos  81     01 May 2019 07:21    PT/INR - ( 01 May 2019 07:21 )   PT: 13.70 sec;   INR: 1.19 ratio                 Radiology:    General: comfortable, NAD  Neurology: A&Ox3, nonfocal  Head:  Normocephalic, atraumatic  ENT:  Mucosa moist, no ulcerations  Neck:  Supple, no JVD,   Skin: no breakdowns (as per RN)  Resp: CTA B/L  CV: RRR, S1S2,   GI: Soft, NT, bowel sounds  MS: No edema, + peripheral pulses, FROM all 4 extremity

## 2019-05-05 RX ORDER — APIXABAN 2.5 MG/1
1 TABLET, FILM COATED ORAL
Qty: 60 | Refills: 0 | OUTPATIENT
Start: 2019-05-05 | End: 2019-06-03

## 2019-05-06 RX ORDER — APIXABAN 2.5 MG/1
1 TABLET, FILM COATED ORAL
Qty: 60 | Refills: 0
Start: 2019-05-06 | End: 2019-06-04

## 2019-05-08 ENCOUNTER — INPATIENT (INPATIENT)
Facility: HOSPITAL | Age: 74
LOS: 2 days | Discharge: HOME HEALTH | End: 2019-05-11
Attending: INTERNAL MEDICINE | Admitting: INTERNAL MEDICINE
Payer: MEDICARE

## 2019-05-08 VITALS
RESPIRATION RATE: 20 BRPM | OXYGEN SATURATION: 97 % | TEMPERATURE: 98 F | HEART RATE: 96 BPM | SYSTOLIC BLOOD PRESSURE: 140 MMHG | DIASTOLIC BLOOD PRESSURE: 84 MMHG

## 2019-05-08 DIAGNOSIS — Z98.890 OTHER SPECIFIED POSTPROCEDURAL STATES: Chronic | ICD-10-CM

## 2019-05-08 DIAGNOSIS — J45.901 UNSPECIFIED ASTHMA WITH (ACUTE) EXACERBATION: ICD-10-CM

## 2019-05-08 DIAGNOSIS — I10 ESSENTIAL (PRIMARY) HYPERTENSION: ICD-10-CM

## 2019-05-08 DIAGNOSIS — Z85.3 PERSONAL HISTORY OF MALIGNANT NEOPLASM OF BREAST: ICD-10-CM

## 2019-05-08 DIAGNOSIS — I87.2 VENOUS INSUFFICIENCY (CHRONIC) (PERIPHERAL): ICD-10-CM

## 2019-05-08 DIAGNOSIS — C50.812 MALIGNANT NEOPLASM OF OVERLAPPING SITES OF LEFT FEMALE BREAST: ICD-10-CM

## 2019-05-08 DIAGNOSIS — Z90.89 ACQUIRED ABSENCE OF OTHER ORGANS: ICD-10-CM

## 2019-05-08 DIAGNOSIS — M19.90 UNSPECIFIED OSTEOARTHRITIS, UNSPECIFIED SITE: ICD-10-CM

## 2019-05-08 DIAGNOSIS — E66.9 OBESITY, UNSPECIFIED: ICD-10-CM

## 2019-05-08 DIAGNOSIS — I26.99 OTHER PULMONARY EMBOLISM WITHOUT ACUTE COR PULMONALE: ICD-10-CM

## 2019-05-08 DIAGNOSIS — L97.929 NON-PRESSURE CHRONIC ULCER OF UNSPECIFIED PART OF LEFT LOWER LEG WITH UNSPECIFIED SEVERITY: ICD-10-CM

## 2019-05-08 DIAGNOSIS — Z79.82 LONG TERM (CURRENT) USE OF ASPIRIN: ICD-10-CM

## 2019-05-08 DIAGNOSIS — L03.116 CELLULITIS OF LEFT LOWER LIMB: ICD-10-CM

## 2019-05-08 DIAGNOSIS — Z98.890 OTHER SPECIFIED POSTPROCEDURAL STATES: ICD-10-CM

## 2019-05-08 DIAGNOSIS — I83.029 VARICOSE VEINS OF LEFT LOWER EXTREMITY WITH ULCER OF UNSPECIFIED SITE: ICD-10-CM

## 2019-05-08 LAB
ALBUMIN SERPL ELPH-MCNC: 3.4 G/DL — LOW (ref 3.5–5.2)
ALP SERPL-CCNC: 74 U/L — SIGNIFICANT CHANGE UP (ref 30–115)
ALT FLD-CCNC: 16 U/L — SIGNIFICANT CHANGE UP (ref 0–41)
ANION GAP SERPL CALC-SCNC: 11 MMOL/L — SIGNIFICANT CHANGE UP (ref 7–14)
AST SERPL-CCNC: 29 U/L — SIGNIFICANT CHANGE UP (ref 0–41)
BASOPHILS # BLD AUTO: 0.04 K/UL — SIGNIFICANT CHANGE UP (ref 0–0.2)
BASOPHILS NFR BLD AUTO: 0.5 % — SIGNIFICANT CHANGE UP (ref 0–1)
BILIRUB SERPL-MCNC: 0.5 MG/DL — SIGNIFICANT CHANGE UP (ref 0.2–1.2)
BUN SERPL-MCNC: 11 MG/DL — SIGNIFICANT CHANGE UP (ref 10–20)
CALCIUM SERPL-MCNC: 8.5 MG/DL — SIGNIFICANT CHANGE UP (ref 8.5–10.1)
CHLORIDE SERPL-SCNC: 103 MMOL/L — SIGNIFICANT CHANGE UP (ref 98–110)
CO2 SERPL-SCNC: 26 MMOL/L — SIGNIFICANT CHANGE UP (ref 17–32)
CREAT SERPL-MCNC: 0.7 MG/DL — SIGNIFICANT CHANGE UP (ref 0.7–1.5)
EOSINOPHIL # BLD AUTO: 0.12 K/UL — SIGNIFICANT CHANGE UP (ref 0–0.7)
EOSINOPHIL NFR BLD AUTO: 1.6 % — SIGNIFICANT CHANGE UP (ref 0–8)
GLUCOSE SERPL-MCNC: 94 MG/DL — SIGNIFICANT CHANGE UP (ref 70–99)
HCT VFR BLD CALC: 38 % — SIGNIFICANT CHANGE UP (ref 37–47)
HGB BLD-MCNC: 12.1 G/DL — SIGNIFICANT CHANGE UP (ref 12–16)
IMM GRANULOCYTES NFR BLD AUTO: 0.4 % — HIGH (ref 0.1–0.3)
LACTATE SERPL-SCNC: 0.8 MMOL/L — SIGNIFICANT CHANGE UP (ref 0.5–2.2)
LYMPHOCYTES # BLD AUTO: 1.42 K/UL — SIGNIFICANT CHANGE UP (ref 1.2–3.4)
LYMPHOCYTES # BLD AUTO: 18.9 % — LOW (ref 20.5–51.1)
MCHC RBC-ENTMCNC: 30.3 PG — SIGNIFICANT CHANGE UP (ref 27–31)
MCHC RBC-ENTMCNC: 31.8 G/DL — LOW (ref 32–37)
MCV RBC AUTO: 95.2 FL — SIGNIFICANT CHANGE UP (ref 81–99)
MONOCYTES # BLD AUTO: 0.57 K/UL — SIGNIFICANT CHANGE UP (ref 0.1–0.6)
MONOCYTES NFR BLD AUTO: 7.6 % — SIGNIFICANT CHANGE UP (ref 1.7–9.3)
NEUTROPHILS # BLD AUTO: 5.33 K/UL — SIGNIFICANT CHANGE UP (ref 1.4–6.5)
NEUTROPHILS NFR BLD AUTO: 71 % — SIGNIFICANT CHANGE UP (ref 42.2–75.2)
NRBC # BLD: 0 /100 WBCS — SIGNIFICANT CHANGE UP (ref 0–0)
PLATELET # BLD AUTO: 138 K/UL — SIGNIFICANT CHANGE UP (ref 130–400)
POTASSIUM SERPL-MCNC: 6.3 MMOL/L — CRITICAL HIGH (ref 3.5–5)
POTASSIUM SERPL-SCNC: 6.3 MMOL/L — CRITICAL HIGH (ref 3.5–5)
PROT SERPL-MCNC: 5.9 G/DL — LOW (ref 6–8)
RBC # BLD: 3.99 M/UL — LOW (ref 4.2–5.4)
RBC # FLD: 13.5 % — SIGNIFICANT CHANGE UP (ref 11.5–14.5)
SODIUM SERPL-SCNC: 140 MMOL/L — SIGNIFICANT CHANGE UP (ref 135–146)
WBC # BLD: 7.51 K/UL — SIGNIFICANT CHANGE UP (ref 4.8–10.8)
WBC # FLD AUTO: 7.51 K/UL — SIGNIFICANT CHANGE UP (ref 4.8–10.8)

## 2019-05-08 PROCEDURE — 93970 EXTREMITY STUDY: CPT | Mod: 26

## 2019-05-08 PROCEDURE — 99285 EMERGENCY DEPT VISIT HI MDM: CPT

## 2019-05-08 RX ORDER — ACETAMINOPHEN 500 MG
650 TABLET ORAL ONCE
Qty: 0 | Refills: 0 | Status: COMPLETED | OUTPATIENT
Start: 2019-05-08 | End: 2019-05-08

## 2019-05-08 RX ADMIN — Medication 650 MILLIGRAM(S): at 20:18

## 2019-05-08 NOTE — ED PROVIDER NOTE - PHYSICAL EXAMINATION
CONST: Well appearing in NAD  EYES: Sclera and conjunctiva clear.  CARD: Normal S1 S2; Normal rate and rhythm  RESP: Equal BS B/L, No wheezes, rhonchi or rales. No distress  GI: Soft, non-tender, non-distended.  MS: Normal ROM in all extremities. 2+ edema of lower extremities, no calf pain, pedal pulses 2+ bilaterally  SKIN: + chornic venous stasis of bilateral lower extremity, small area of erythema with no warmth of anterior shin, no TTP  NEURO: A&Ox3, No focal deficits. Strength 5/5 with no sensory deficits.

## 2019-05-08 NOTE — ED PROVIDER NOTE - OBJECTIVE STATEMENT
74 y.o female w/ hx of venous insufficiency, asthma, HTN, ductal carcinoma s/p lumpectomy, no chemo, PE on NOAC presents to the ED for evaluation of erythema of bilateral shins.  States that she has chronic edema of lower extremities.  Over past 4 days noted erythema of bilateral shins prompting visit to the ED.  Adds no other complaints at this time.  Admits to chills.  Denies chest pain, dyspnea, calf pain, hemoptysis, fever, streaking erythema.  No further complaints.  Daughter concerned for cellulitis given to erythema and warmth.  finished doxy on 5/2 for cellulitis of bilateral LE.  Admitted 4/27 -5/2 for dyspnea.  founds to have pe.  started on apixiban.

## 2019-05-08 NOTE — ED PROVIDER NOTE - NS ED ROS FT
Constitutional: See HPI.  Eyes: No visual changes, eye pain or discharge.   ENMT: No hearing changes, pain, discharge or infections.   Cardiac: No SOB or edema. No chest pain with exertion.  Respiratory: No cough or respiratory distress.   GI: No nausea, vomiting, diarrhea or abdominal pain.  : No dysuria, frequency or burning. No Discharge  MS: No myalgia, muscle weakness, joint pain or back pain.  Neuro: No headache or weakness.   Skin: + erythema of shins.   Except as documented in the HPI, all other systems are negative.

## 2019-05-08 NOTE — ED PROVIDER NOTE - ATTENDING CONTRIBUTION TO CARE
74 y.o female w/ hx of venous insufficiency, asthma, HTN, ductal carcinoma s/p lumpectomy, no chemo, PE on NOAC presents to the ED for evaluation of erythema of bilateral lower extremities, patient recently completed a course of doxy and symptoms improved, now symptoms have returned, no cp/sob, no n/v/d, no loc, no fever + chills today    CONSTITUTIONAL: Well-developed; well-nourished; in no acute distress. Sitting up and providing appropriate history and physical examination  SKIN: see below, remainder of skin exam is warm and dry, no acute rash.  HEAD: Normocephalic; atraumatic.  EXT: 3+ pitting edema bilateral, + bilateral erythema, rle greater than left, + intact pulses, + warmth to the anterior RLE, Normal ROM. No clubbing, cyanosis. Dp and Pt Pulses intact. Cap refill less than 3 seconds  NEURO: Alert, oriented, grossly unremarkable. No Focal deficits. GCS 15. NIH 0  PSYCH: Cooperative, appropriate.

## 2019-05-08 NOTE — ED PROVIDER NOTE - CLINICAL SUMMARY MEDICAL DECISION MAKING FREE TEXT BOX
I personally evaluated the patient. I reviewed the Resident’s or Physician Assistant’s note (as assigned above), and agree with the findings and plan except as documented in my note. labs and imaging obtained, started on unasyn, will admit for iv antibiotics, Duplex negative for dvt as per vascular tech

## 2019-05-09 ENCOUNTER — APPOINTMENT (OUTPATIENT)
Dept: BREAST CENTER | Facility: CLINIC | Age: 74
End: 2019-05-09

## 2019-05-09 DIAGNOSIS — I26.99 OTHER PULMONARY EMBOLISM WITHOUT ACUTE COR PULMONALE: ICD-10-CM

## 2019-05-09 DIAGNOSIS — R09.89 OTHER SPECIFIED SYMPTOMS AND SIGNS INVOLVING THE CIRCULATORY AND RESPIRATORY SYSTEMS: ICD-10-CM

## 2019-05-09 LAB
ALBUMIN SERPL ELPH-MCNC: 3.6 G/DL — SIGNIFICANT CHANGE UP (ref 3.5–5.2)
ALP SERPL-CCNC: 77 U/L — SIGNIFICANT CHANGE UP (ref 30–115)
ALT FLD-CCNC: 14 U/L — SIGNIFICANT CHANGE UP (ref 0–41)
ANION GAP SERPL CALC-SCNC: 11 MMOL/L — SIGNIFICANT CHANGE UP (ref 7–14)
ANION GAP SERPL CALC-SCNC: 12 MMOL/L — SIGNIFICANT CHANGE UP (ref 7–14)
APTT BLD: 34.8 SEC — SIGNIFICANT CHANGE UP (ref 27–39.2)
AST SERPL-CCNC: 13 U/L — SIGNIFICANT CHANGE UP (ref 0–41)
BASOPHILS # BLD AUTO: 0.04 K/UL — SIGNIFICANT CHANGE UP (ref 0–0.2)
BASOPHILS NFR BLD AUTO: 0.8 % — SIGNIFICANT CHANGE UP (ref 0–1)
BILIRUB DIRECT SERPL-MCNC: <0.2 MG/DL — SIGNIFICANT CHANGE UP (ref 0–0.2)
BILIRUB INDIRECT FLD-MCNC: >0.3 MG/DL — SIGNIFICANT CHANGE UP (ref 0.2–1.2)
BILIRUB SERPL-MCNC: 0.5 MG/DL — SIGNIFICANT CHANGE UP (ref 0.2–1.2)
BUN SERPL-MCNC: 10 MG/DL — SIGNIFICANT CHANGE UP (ref 10–20)
BUN SERPL-MCNC: 9 MG/DL — LOW (ref 10–20)
CALCIUM SERPL-MCNC: 8.6 MG/DL — SIGNIFICANT CHANGE UP (ref 8.5–10.1)
CALCIUM SERPL-MCNC: 8.7 MG/DL — SIGNIFICANT CHANGE UP (ref 8.5–10.1)
CHLORIDE SERPL-SCNC: 105 MMOL/L — SIGNIFICANT CHANGE UP (ref 98–110)
CHLORIDE SERPL-SCNC: 105 MMOL/L — SIGNIFICANT CHANGE UP (ref 98–110)
CHOLEST SERPL-MCNC: 160 MG/DL — SIGNIFICANT CHANGE UP (ref 100–200)
CO2 SERPL-SCNC: 27 MMOL/L — SIGNIFICANT CHANGE UP (ref 17–32)
CO2 SERPL-SCNC: 27 MMOL/L — SIGNIFICANT CHANGE UP (ref 17–32)
CREAT SERPL-MCNC: 0.6 MG/DL — LOW (ref 0.7–1.5)
CREAT SERPL-MCNC: 0.7 MG/DL — SIGNIFICANT CHANGE UP (ref 0.7–1.5)
EOSINOPHIL # BLD AUTO: 0.13 K/UL — SIGNIFICANT CHANGE UP (ref 0–0.7)
EOSINOPHIL NFR BLD AUTO: 2.6 % — SIGNIFICANT CHANGE UP (ref 0–8)
ERYTHROCYTE [SEDIMENTATION RATE] IN BLOOD: 44 MM/HR — HIGH (ref 0–20)
ESTIMATED AVERAGE GLUCOSE: 123 MG/DL — HIGH (ref 68–114)
GLUCOSE SERPL-MCNC: 84 MG/DL — SIGNIFICANT CHANGE UP (ref 70–99)
GLUCOSE SERPL-MCNC: 90 MG/DL — SIGNIFICANT CHANGE UP (ref 70–99)
HBA1C BLD-MCNC: 5.9 % — HIGH (ref 4–5.6)
HCT VFR BLD CALC: 35.9 % — LOW (ref 37–47)
HDLC SERPL-MCNC: 60 MG/DL — SIGNIFICANT CHANGE UP
HGB BLD-MCNC: 11.3 G/DL — LOW (ref 12–16)
IMM GRANULOCYTES NFR BLD AUTO: 0.4 % — HIGH (ref 0.1–0.3)
INR BLD: 0.98 RATIO — SIGNIFICANT CHANGE UP (ref 0.65–1.3)
LACTATE SERPL-SCNC: 1.4 MMOL/L — SIGNIFICANT CHANGE UP (ref 0.5–2.2)
LIPID PNL WITH DIRECT LDL SERPL: 99 MG/DL — SIGNIFICANT CHANGE UP (ref 4–129)
LYMPHOCYTES # BLD AUTO: 0.89 K/UL — LOW (ref 1.2–3.4)
LYMPHOCYTES # BLD AUTO: 17.7 % — LOW (ref 20.5–51.1)
MAGNESIUM SERPL-MCNC: 2.1 MG/DL — SIGNIFICANT CHANGE UP (ref 1.8–2.4)
MCHC RBC-ENTMCNC: 30.1 PG — SIGNIFICANT CHANGE UP (ref 27–31)
MCHC RBC-ENTMCNC: 31.5 G/DL — LOW (ref 32–37)
MCV RBC AUTO: 95.5 FL — SIGNIFICANT CHANGE UP (ref 81–99)
MONOCYTES # BLD AUTO: 0.38 K/UL — SIGNIFICANT CHANGE UP (ref 0.1–0.6)
MONOCYTES NFR BLD AUTO: 7.5 % — SIGNIFICANT CHANGE UP (ref 1.7–9.3)
NEUTROPHILS # BLD AUTO: 3.58 K/UL — SIGNIFICANT CHANGE UP (ref 1.4–6.5)
NEUTROPHILS NFR BLD AUTO: 71 % — SIGNIFICANT CHANGE UP (ref 42.2–75.2)
NRBC # BLD: 0 /100 WBCS — SIGNIFICANT CHANGE UP (ref 0–0)
PHOSPHATE SERPL-MCNC: 4.1 MG/DL — SIGNIFICANT CHANGE UP (ref 2.1–4.9)
PLATELET # BLD AUTO: 131 K/UL — SIGNIFICANT CHANGE UP (ref 130–400)
POTASSIUM SERPL-MCNC: 4.6 MMOL/L — SIGNIFICANT CHANGE UP (ref 3.5–5)
POTASSIUM SERPL-MCNC: 4.6 MMOL/L — SIGNIFICANT CHANGE UP (ref 3.5–5)
POTASSIUM SERPL-SCNC: 4.6 MMOL/L — SIGNIFICANT CHANGE UP (ref 3.5–5)
POTASSIUM SERPL-SCNC: 4.6 MMOL/L — SIGNIFICANT CHANGE UP (ref 3.5–5)
PROT SERPL-MCNC: 5.5 G/DL — LOW (ref 6–8)
PROTHROM AB SERPL-ACNC: 11.3 SEC — SIGNIFICANT CHANGE UP (ref 9.95–12.87)
RBC # BLD: 3.76 M/UL — LOW (ref 4.2–5.4)
RBC # FLD: 13.2 % — SIGNIFICANT CHANGE UP (ref 11.5–14.5)
SODIUM SERPL-SCNC: 143 MMOL/L — SIGNIFICANT CHANGE UP (ref 135–146)
SODIUM SERPL-SCNC: 144 MMOL/L — SIGNIFICANT CHANGE UP (ref 135–146)
TOTAL CHOLESTEROL/HDL RATIO MEASUREMENT: 2.7 RATIO — LOW (ref 4–5.5)
TRIGL SERPL-MCNC: 86 MG/DL — SIGNIFICANT CHANGE UP (ref 10–149)
TROPONIN T SERPL-MCNC: <0.01 NG/ML — SIGNIFICANT CHANGE UP
WBC # BLD: 5.04 K/UL — SIGNIFICANT CHANGE UP (ref 4.8–10.8)
WBC # FLD AUTO: 5.04 K/UL — SIGNIFICANT CHANGE UP (ref 4.8–10.8)

## 2019-05-09 RX ORDER — ACETAMINOPHEN 500 MG
650 TABLET ORAL EVERY 6 HOURS
Refills: 0 | Status: DISCONTINUED | OUTPATIENT
Start: 2019-05-09 | End: 2019-05-11

## 2019-05-09 RX ORDER — ESCITALOPRAM OXALATE 10 MG/1
10 TABLET, FILM COATED ORAL DAILY
Refills: 0 | Status: DISCONTINUED | OUTPATIENT
Start: 2019-05-09 | End: 2019-05-11

## 2019-05-09 RX ORDER — APIXABAN 2.5 MG/1
5 TABLET, FILM COATED ORAL EVERY 12 HOURS
Refills: 0 | Status: DISCONTINUED | OUTPATIENT
Start: 2019-05-09 | End: 2019-05-11

## 2019-05-09 RX ORDER — LEVOTHYROXINE SODIUM 125 MCG
150 TABLET ORAL DAILY
Refills: 0 | Status: DISCONTINUED | OUTPATIENT
Start: 2019-05-09 | End: 2019-05-11

## 2019-05-09 RX ORDER — LOSARTAN POTASSIUM 100 MG/1
25 TABLET, FILM COATED ORAL DAILY
Refills: 0 | Status: DISCONTINUED | OUTPATIENT
Start: 2019-05-09 | End: 2019-05-11

## 2019-05-09 RX ORDER — ZOLPIDEM TARTRATE 10 MG/1
5 TABLET ORAL AT BEDTIME
Refills: 0 | Status: DISCONTINUED | OUTPATIENT
Start: 2019-05-09 | End: 2019-05-11

## 2019-05-09 RX ORDER — BUDESONIDE AND FORMOTEROL FUMARATE DIHYDRATE 160; 4.5 UG/1; UG/1
2 AEROSOL RESPIRATORY (INHALATION)
Refills: 0 | Status: DISCONTINUED | OUTPATIENT
Start: 2019-05-09 | End: 2019-05-11

## 2019-05-09 RX ORDER — ATORVASTATIN CALCIUM 80 MG/1
40 TABLET, FILM COATED ORAL AT BEDTIME
Refills: 0 | Status: DISCONTINUED | OUTPATIENT
Start: 2019-05-09 | End: 2019-05-11

## 2019-05-09 RX ORDER — MONTELUKAST 4 MG/1
10 TABLET, CHEWABLE ORAL DAILY
Refills: 0 | Status: DISCONTINUED | OUTPATIENT
Start: 2019-05-09 | End: 2019-05-11

## 2019-05-09 RX ADMIN — ESCITALOPRAM OXALATE 10 MILLIGRAM(S): 10 TABLET, FILM COATED ORAL at 13:51

## 2019-05-09 RX ADMIN — Medication 1 APPLICATION(S): at 18:25

## 2019-05-09 RX ADMIN — Medication 110 MILLIGRAM(S): at 18:24

## 2019-05-09 RX ADMIN — Medication 100 MILLIGRAM(S): at 13:51

## 2019-05-09 RX ADMIN — BUDESONIDE AND FORMOTEROL FUMARATE DIHYDRATE 2 PUFF(S): 160; 4.5 AEROSOL RESPIRATORY (INHALATION) at 23:50

## 2019-05-09 RX ADMIN — BUDESONIDE AND FORMOTEROL FUMARATE DIHYDRATE 2 PUFF(S): 160; 4.5 AEROSOL RESPIRATORY (INHALATION) at 09:26

## 2019-05-09 RX ADMIN — Medication 100 MILLIGRAM(S): at 06:02

## 2019-05-09 RX ADMIN — MONTELUKAST 10 MILLIGRAM(S): 4 TABLET, CHEWABLE ORAL at 13:51

## 2019-05-09 RX ADMIN — Medication 150 MICROGRAM(S): at 06:02

## 2019-05-09 NOTE — PHYSICAL THERAPY INITIAL EVALUATION ADULT - PERTINENT HX OF CURRENT PROBLEM, REHAB EVAL
74/F admitted for B/L lower extremity  cellultis; recently in [facility] for short term rehabilitation

## 2019-05-09 NOTE — PHYSICAL THERAPY INITIAL EVALUATION ADULT - GENERAL OBSERVATIONS, REHAB EVAL
220-248 pm Chart reviewed. Pt. seen out of bed, in No apparent distress , + IV lock , + erythema and swelling on B/L lower extremities , daughter at bedside

## 2019-05-09 NOTE — H&P ADULT - HISTORY OF PRESENT ILLNESS
74 year old female with pertinent medical history of venous insufficiency, Asthma, Hypertension, Ductal carcinoma s/p lumpectomy, Subsegmental Pulmonary Embolism on eliquis presents to the ED for evaluation of erythema of bilateral shins. She has had chronic edema of lower extremities, however, over the past 4 days ever since her discharge from the hospital she noticed worsening of erythema around bilateral shins prompting visit to the ED. The patient has noticed the edema to be associated with more warmth than normal and chills. She recently finished a course of doxycycline for cellulitis. She has no other complaints at this time. She was discharged last week to [place] for rehab and has been participating in Rehab at that facility.

## 2019-05-09 NOTE — H&P ADULT - ASSESSMENT
74 year old female being evaluated for    Rule Out Cellulitis  -Patient has pitting lower extremity edema with erythema and warmth  -Will start treatment with clindamycin 600 mg every 8 hours  -Recent duplex was negative for DVT    Right Lower Lobe Subsegmental Pulmonary Embolism with evidence of Right heart strain   - on Apixaban on 5mg every 12hr  - Out-patient follow up with Dr. Hickey    Dry Cough   - improved  - Likely hyperactive airway disease     Left Sided Ductal Carcinoma   - s/p lumpectomy with clear axillary lymph nodes  - currently not receiving chemotherapy or radiation therapy    Hypertension  - continue losartan for now    Hemolyzed Hyperkalemia  - follow up repeat potassium levels on the BMP ordered    DVT prophylaxis on Eliquis   Disposition: [facility]  Full Code for now 74 year old female being evaluated for    Chronic Venous Stasis vs Rule Out Cellulitis?  -Patient has pitting lower extremity edema with erythema and warmth  -More likely to be due to chronic venous stasis, however will start treatment with clindamycin 600 mg every 8 hours for now  -Recent duplex was negative for DVT    Right Lower Lobe Subsegmental Pulmonary Embolism with evidence of Right heart strain   - on Apixaban on 5mg every 12hr  - Out-patient follow up with Dr. Hickey    Dry Cough   - improved  - Likely hyperactive airway disease     Left Sided Ductal Carcinoma   - s/p lumpectomy with clear axillary lymph nodes  - currently not receiving chemotherapy or radiation therapy    Hypertension  - continue losartan for now    Hemolyzed Hyperkalemia  - follow up repeat potassium levels on the BMP ordered    DVT prophylaxis on Eliquis   Disposition: [facility]  Full Code for now

## 2019-05-09 NOTE — H&P ADULT - NSHPLABSRESULTS_GEN_ALL_CORE
VITALS:   T(F): 97.8  HR: 69  BP: 129/67  RR: 24  SpO2: 97%    LABS:                        12.1   7.51  )-----------( 138      ( 08 May 2019 21:16 )             38.0     05-08    140  |  103  |  11  ----------------------------<  94  6.3<HH>   |  26  |  0.7    Ca    8.5      08 May 2019 21:16    TPro  5.9<L>  /  Alb  3.4<L>  /  TBili  0.5  /  DBili  x   /  AST  29  /  ALT  16  /  AlkPhos  74  05-08      Lactate, Blood: 0.8 mmol/L (05-08-19 @ 21:16)      RADIOLOGY:    CT Angio Chest w/ IV Cont (04.27.19 @ 01:14) >  Subsegmental pulmonary emboli in the right lower lobe as described. No CT evidence of right heart strain.  Asymmetric left breast soft tissue density. Correlate with known history of breast cancer.

## 2019-05-09 NOTE — H&P ADULT - NSICDXPASTMEDICALHX_GEN_ALL_CORE_FT
PAST MEDICAL HISTORY:  Asthma     Breast cancer LEFT    HTN (hypertension)     OA (osteoarthritis)     Pulmonary embolism on eliquis    PVD (peripheral vascular disease)

## 2019-05-09 NOTE — CONSULT NOTE ADULT - ASSESSMENT
IMPRESSION: Rehab of gait dysfunction      PRECAUTIONS: [  ] Cardiac  [  ] Respiratory  [  ] Seizures [  ] Contact Isolation  [  ] Droplet Isolation  [  ] Other    Weight Bearing Status:     RECOMMENDATION:    Out of Bed to Chair     DVT/Decubiti Prophylaxis    REHAB PLAN:     [ x  ] Bedside P/T 3-5 times a week   [   ]   Bedside O/T  2-3 times a week             [   ] No Rehab Therapy Indicated                   [   ]  Speech Therapy   Conditioning/ROM                                    ADL  Bed Mobility                                               Conditioning/ROM  Transfers                                                     Bed Mobility  Sitting /Standing Balance                         Transfers                                        Gait Training                                               Sitting/Standing Balance  Stair Training [   ]Applicable                    Home equipment Eval                                                                        Splinting  [   ] Only      GOALS:   ADL   [   ]   Independent                    Transfers  [ x  ] Independent                          Ambulation  [x   ] Independent     [ x   ] With device                            [   ]  CG                                                         [   ]  CG                                                                  [   ] CG                            [    ] Min A                                                   [   ] Min A                                                              [   ] Min  A          DISCHARGE PLAN:   [   ]  Good candidate for Intensive Rehabilitation/Hospital based                                             Will tolerate 3hrs Intensive Rehab Daily                                       [ x   ]  Short Term Rehab in Skilled Nursing Facility                                       [    ]  Home with Outpatient or  services                                         [    ]  Possible Candidate for Intensive Hospital based Rehab

## 2019-05-09 NOTE — CONSULT NOTE ADULT - SUBJECTIVE AND OBJECTIVE BOX
HPI:  74 year old female with pertinent medical history of venous insufficiency, Asthma, Hypertension, Ductal carcinoma s/p lumpectomy, Subsegmental Pulmonary Embolism on eliquis presents to the ED for evaluation of erythema of bilateral shins. She has had chronic edema of lower extremities, however, over the past 4 days ever since her discharge from the hospital she noticed worsening of erythema around bilateral shins prompting visit to the ED. The patient has noticed the edema to be associated with more warmth than normal and chills. She recently finished a course of doxycycline for cellulitis. She has no other complaints at this time. She was discharged last week to [facility] for rehab and has been participating in Rehab at that facility. (09 May 2019 05:48)      PAST MEDICAL & SURGICAL HISTORY:  Pulmonary embolism: on eliquis  OA (osteoarthritis)  Breast cancer: LEFT  PVD (peripheral vascular disease)  HTN (hypertension)  Asthma  S/P lumpectomy, left breast  H/O thyroidectomy: 2017      Hospital Course:    TODAY'S SUBJECTIVE & REVIEW OF SYMPTOMS:     Constitutional WNL   Cardio WNL   Resp WNL   GI WNL  Heme WNL  Endo WNL  Skin erythema payton le  MSK WNL  Neuro WNL  Cognitive WNL  Psych WNL      MEDICATIONS  (STANDING):  atorvastatin 40 milliGRAM(s) Oral at bedtime  buDESOnide 160 MICROgram(s)/formoterol 4.5 MICROgram(s) Inhaler 2 Puff(s) Inhalation two times a day  doxycycline IVPB 100 milliGRAM(s) IV Intermittent every 12 hours  escitalopram 10 milliGRAM(s) Oral daily  levothyroxine 150 MICROGram(s) Oral daily  losartan 25 milliGRAM(s) Oral daily  montelukast 10 milliGRAM(s) Oral daily  silver sulfADIAZINE 1% Cream 1 Application(s) Topical daily    MEDICATIONS  (PRN):  acetaminophen   Tablet .. 650 milliGRAM(s) Oral every 6 hours PRN Temp greater or equal to 38C (100.4F), Moderate Pain (4 - 6)  zolpidem 5 milliGRAM(s) Oral at bedtime PRN Insomnia      FAMILY HISTORY:      Allergies    No Known Allergies    Intolerances        SOCIAL HISTORY:    [  ] Etoh  [  ] Smoking  [  ] Substance abuse     Home Environment:  [  ] Home Alone  [  ] Lives with Family  [  ] Home Health Aid    Dwelling:  [  ] Apartment  [  ] Private House  [  ] Adult Home  [  ] Skilled Nursing Facility      [x  ] Short Term  [  ] Long Term  [  ] Stairs       Elevator [  ]    FUNCTIONAL STATUS PTA: (Check all that apply)  Ambulation: [   ]Independent    [ x ] Dependent     [  ] Non-Ambulatory  Assistive Device: [  ] SA Cane  [  ]  Q Cane  [ x ] Walker  [  ]  Wheelchair  ADL : [  ] Independent  [x  ]  Dependent       Vital Signs Last 24 Hrs  T(C): 35.9 (09 May 2019 15:45), Max: 36.8 (08 May 2019 18:46)  T(F): 96.6 (09 May 2019 15:45), Max: 98.2 (08 May 2019 18:46)  HR: 91 (09 May 2019 15:45) (64 - 96)  BP: 144/70 (09 May 2019 15:45) (129/67 - 164/88)  BP(mean): 93 (09 May 2019 00:39) (93 - 93)  RR: 19 (09 May 2019 15:45) (19 - 24)  SpO2: 97% (09 May 2019 15:45) (97% - 97%)      PHYSICAL EXAM: Alert & Oriented X3  GENERAL: NAD, well-groomed, well-developed  HEAD:  Atraumatic, Normocephalic  CHEST/LUNG: Clear   HEART: S1S2+  ABDOMEN: Soft, Nontender  EXTREMITIES:  + erythema payton le    NERVOUS SYSTEM:  Cranial Nerves 2-12 intact [  ] Abnormal  [  ]  ROM: WFL all extremities [x  ]  Abnormal [  ]  Motor Strength: WFL all extremities  [  ]  Abnormal [ x ]4/5 all ext  Sensation: intact to light touch [ x ] Abnormal [  ]  Reflexes: Symmetric [  ]  Abnormal [  ]    FUNCTIONAL STATUS:  Bed Mobility: Independent [  ]  Supervision [  ]  Needs Assistance [x  ]  N/A [  ]  Transfers: Independent [  ]  Supervision [  ]  Needs Assistance [ x ]  N/A [  ]   Ambulation: Independent [  ]  Supervision [  ]  Needs Assistance [  ]  N/A [  ]  ADL: Independent [  ] Requires Assistance [  ] N/A [  ]      LABS:                        11.3   5.04  )-----------( 131      ( 09 May 2019 11:34 )             35.9     05-09    144  |  105  |  9<L>  ----------------------------<  84  4.6   |  27  |  0.6<L>    Ca    8.7      09 May 2019 11:34  Phos  4.1     05-09  Mg     2.1     05-09    TPro  5.5<L>  /  Alb  3.6  /  TBili  0.5  /  DBili  <0.2  /  AST  13  /  ALT  14  /  AlkPhos  77  05-09    PT/INR - ( 09 May 2019 11:34 )   PT: 11.30 sec;   INR: 0.98 ratio         PTT - ( 09 May 2019 11:34 )  PTT:34.8 sec      RADIOLOGY & ADDITIONAL STUDIES:    Assesment:

## 2019-05-09 NOTE — H&P ADULT - NSHPPHYSICALEXAM_GEN_ALL_CORE
GEN: mild distress  LUNGS: distant breath sounds bilaterally   HEART: S1/S2 present. RRR.   ABD: Soft, non-tender, non-distended  EXT: pitting lower extremity edema with erythema an warmth  NEURO: AAOX3

## 2019-05-09 NOTE — CONSULT NOTE ADULT - SUBJECTIVE AND OBJECTIVE BOX
CYRUS STEINRED  74y, Female  Allergy: No Known Allergies      CHIEF COMPLAINT: lower extremity edema (09 May 2019 05:48)      HPI:  74 year old female with pertinent medical history of venous insufficiency, Asthma, Hypertension, Ductal carcinoma s/p lumpectomy, Subsegmental Pulmonary Embolism on eliquis presents to the ED for evaluation of erythema of bilateral shins. She has had chronic edema of lower extremities, however, over the past 4 days ever since her discharge from the hospital she noticed worsening of erythema around bilateral shins prompting visit to the ED. The patient has noticed the edema to be associated with more warmth than normal and chills. She recently finished a course of doxycycline for cellulitis. She has no other complaints at this time. She was discharged last week to [facility] for rehab and has been participating in Rehab at that facility. (09 May 2019 05:48)    FAMILY HISTORY:    PAST MEDICAL & SURGICAL HISTORY:  Pulmonary embolism: on eliquis  OA (osteoarthritis)  Breast cancer: LEFT  PVD (peripheral vascular disease)  HTN (hypertension)  Asthma  S/P lumpectomy, left breast  H/O thyroidectomy: 2017      ROS  General: Denies fevers, chills, nightsweats, weight loss  HEENT: Denies headache, rhinorrhea, sore throat, eye pain  CV: Denies CP, palpitations  PULM: Denies SOB, cough  GI: Denies abdominal pain, diarrhea  : Denies dysuria, hematuria  MSK: Denies arthralgias  SKIN: Denies skin   NEURO: Denies paresthesias, weakness  PSYCH: Denies depression    VITALS:  T(F): 96.6, Max: 98.2 (19 @ 18:46)  HR: 64  BP: 164/88  RR: 20Vital Signs Last 24 Hrs  T(C): 35.9 (09 May 2019 07:16), Max: 36.8 (08 May 2019 18:46)  T(F): 96.6 (09 May 2019 07:16), Max: 98.2 (08 May 2019 18:46)  HR: 64 (09 May 2019 07:16) (64 - 96)  BP: 164/88 (09 May 2019 07:16) (129/67 - 164/88)  BP(mean): 93 (09 May 2019 00:39) (93 - 93)  RR: 20 (09 May 2019 07:16) (20 - 24)  SpO2: 97% (09 May 2019 07:16) (97% - 97%)    PHYSICAL EXAM:  Gen: NAD, resting in bed  HEENT: Normocephalic, atraumatic  Neck: supple, no lymphadenopathy  CV: Regular rate & regular rhythm  Lungs: decreased BS at bases, no fremitus  Abdomen: Soft, BS present  Ext: Warm, well perfused  Neuro: non focal, awake  Skin: no rash, no erythema    TESTS & MEASUREMENTS:                        12.1   7.51  )-----------( 138      ( 08 May 2019 21:16 )             38.0         140  |  103  |  11  ----------------------------<  94  6.3<HH>   |  26  |  0.7    Ca    8.5      08 May 2019 21:16    TPro  5.9<L>  /  Alb  3.4<L>  /  TBili  0.5  /  DBili  x   /  AST  29  /  ALT  16  /  AlkPhos  74      eGFR if Non African American: 86 mL/min/1.73M2 (19 @ 21:16)  eGFR if African American: 100 mL/min/1.73M2 (19 @ 21:16)    LIVER FUNCTIONS - ( 08 May 2019 21:16 )  Alb: 3.4 g/dL / Pro: 5.9 g/dL / ALK PHOS: 74 U/L / ALT: 16 U/L / AST: 29 U/L / GGT: x                 Lactate, Blood: 0.8 mmol/L (19 @ 21:16)      INFECTIOUS DISEASES TESTING      RADIOLOGY & ADDITIONAL TESTS:  I have personally reviewed the last Chest xray  CXR      CT      CARDIOLOGY TESTING  Transthoracic Echocardiogram:    EXAM:  2-D ECHO (TTE) COMPLETE        PROCEDURE DATE:  2019      INTERPRETATION:  REPORT:    TRANSTHORACIC ECHOCARDIOGRAM REPORT         Patient Name:   EL STEIN Accession #: [id number]  Medical Rec #:  [id number]      Height:      65.0 in 165.1 cm  YOB: 1945      Weight:      286.0 lb 129.73 kg  Patient Age:    74 years       BSA:         2.30 m²  Patient Gender: F              BP:          97/58 mmHg       Date of Exam:        2019 1:26:44 PM  Referring Physician: [id number] YARON LIZ  Sonographer:         SANAZ  Reading Physician:   Rk Campuzano M.D.    Procedure:   2D Echo/Doppler/Color Doppler Complete.  Indications: R06.00 - Dyspnea, unspecified  Diagnosis:   Dyspnea, unspecified - R06.00         Summary:   1. Mild-moderate tricuspid regurgitation.   2. Mild aortic regurgitation.    PHYSICIAN INTERPRETATION:  Left Ventricle: Normal left ventricular size and wall thicknesses, with   normal systolic and diastolic function.  Right Ventricle: Normal right ventricular size and function.  Left Atrium: Normal left atrial size.  Right Atrium: Normal right atrial size.  Pericardium: There is no evidence of pericardial effusion.  Mitral Valve: Structurally normal mitral valve, with normal leaflet   excursion. No evidence of mitral valve regurgitation is seen.  Tricuspid Valve: The tricuspid valve is normal in structure.   Mild-moderate tricuspid regurgitation is visualized.  Aortic Valve: The aortic valve is trileaflet. No evidence of aortic   stenosis. Aortic valve thickening with normal leaflet opening. Mild   aortic valve regurgitation is seen.  Pulmonic Valve: Trace pulmonic valve regurgitation.  Aorta: The aortic root is normal in size and structure.       2D AND M-MODE MEASUREMENTS (normal ranges within parentheses):  Left                  Normal   Aorta/Left             Normal  Ventricle:                     Atrium:  IVSd (2D):  1.25 cm  (0.7-1.1) AoV Cusp       1.82  (1.5-2.6)  LVPWd       1.06 cm  (0.7-1.1) Separation:     cm  (2D):                        Left Atrium    2.91  (1.9-4.0)  LVIDd       4.40 cm  (3.4-5.7) (Mmode):        cm  (2D):                          Right  LVIDs       3.27 cm            Ventricle:  (2D):                          RVd (2D):      2.98 cm  LV FS    25.6 %    (>25%)  (2D):  IVSd        0.91 cm  (0.7-1.1)  (Mmode):  LVPWd       0.84 cm  (0.7-1.1)  (Mmode):  LVIDd       5.24 cm  (3.4-5.7)  (Mmode):  LVIDs       3.24 cm  (Mmode):  LV FS       38.2 %    (>25%)  (Mmode):  Relative     0.48     (<0.42)  Wall  Thickness  Rel. Wall    0.32     (<0.42)  Thickness  Mm  LV Mass    71.3 g/m²  Index:  Mmode    SPECTRAL DOPPLER ANALYSIS:  LV DIASTOLIC FUNCTION:  MV Peak E: 0.65 m/s Decel Time: 195 msec  MV Peak A: 0.90 m/s  E/A Ratio: 0.72    Aortic Valve:  AoV VMax:    1.66 m/s  AoV Area, Vmax: 1.85 cm² Vmax Indx: 0.80 cm²/m²  AoV Pk Grad: 11.0 mmHg    LVOT Vmax: 0.90 m/s  LVOT VTI:  0.17 m  LVOT Diam: 2.08 cm    Mitral Valve:  MV P1/2 Time: 56.68 msec  MV Area, PHT: 3.88 cm²    Tricuspid Valve and PA/RV Systolic Pressure: TR Max Velocity: 2.75 m/s RA   Pressure:  RVSP/PASP: 31.2 mmHg    Pulmonic Valve:  PV Max Velocity: 0.75 m/s PV Max P.3 mmHg PV Mean PG:       [id number] Rk Campuzano M.D., Electronically signed on 2019 at 2:33:54   PM              *** Final ***                    RK CAMPUZANO MD  This document has been electronically signed. 2019  1:26PM             (19 @ 13:26)  12 Lead ECG:   Ventricular Rate 69 BPM    Atrial Rate 69 BPM    P-R Interval 162 ms    QRS Duration 86 ms    Q-T Interval 412 ms    QTC Calculation(Bezet) 441 ms    P Axis 34 degrees    R Axis 1 degrees    T Axis 1 degrees    Diagnosis Line Normal sinus rhythm  Minimal voltage criteria for LVH, may be normal variant  T wave abnormality, consider inferior ischemia  Borderline ECG    Confirmed by Fabian Wood ([id number]) on 2019 7:56:50 AM (19 @ 18:54)      MEDICATIONS  atorvastatin 40  buDESOnide 160 MICROgram(s)/formoterol 4.5 MICROgram(s) Inhaler 2  clindamycin IVPB 600  escitalopram 10  levothyroxine 150  losartan 25  montelukast 10      ANTIBIOTICS:  clindamycin IVPB 600 milliGRAM(s) IV Intermittent every 8 hours CYRUS SETINRED  74y, Female  Allergy: No Known Allergies    CHIEF COMPLAINT: lower extremity edema (09 May 2019 05:48)    HPI:  74 year old female with pertinent medical history of venous insufficiency, Asthma, Hypertension, Ductal carcinoma s/p lumpectomy, Subsegmental Pulmonary Embolism on eliquis presents to the ED for evaluation of erythema of bilateral shins. She has had chronic edema of lower extremities, however, over the past 4 days ever since her discharge from the hospital she noticed worsening of erythema around bilateral shins prompting visit to the ED. The patient has noticed the edema to be associated with more warmth than normal and chills. She recently finished a course of doxycycline for cellulitis. She has no other complaints at this time. She was discharged last week to [facility] for rehab and has been participating in Rehab at that facility. (09 May 2019 05:48)    FAMILY HISTORY:    PAST MEDICAL & SURGICAL HISTORY:  Pulmonary embolism: on eliquis  OA (osteoarthritis)  Breast cancer: LEFT  PVD (peripheral vascular disease)  HTN (hypertension)  Asthma  S/P lumpectomy, left breast  H/O thyroidectomy: 2017    ROS  General: Denies fevers, chills, nightsweats, weight loss  HEENT: Denies headache, rhinorrhea, sore throat, eye pain  CV: Denies CP, palpitations  PULM: Denies SOB, cough  GI: Denies abdominal pain, diarrhea  : Denies dysuria, hematuria  MSK: Denies arthralgias  SKIN: Denies skin   NEURO: Denies paresthesias, weakness  PSYCH: Denies depression    VITALS:  T(F): 96.6, Max: 98.2 (05-08-19 @ 18:46)  HR: 64  BP: 164/88  RR: 20Vital Signs Last 24 Hrs  T(C): 35.9 (09 May 2019 07:16), Max: 36.8 (08 May 2019 18:46)  T(F): 96.6 (09 May 2019 07:16), Max: 98.2 (08 May 2019 18:46)  HR: 64 (09 May 2019 07:16) (64 - 96)  BP: 164/88 (09 May 2019 07:16) (129/67 - 164/88)  BP(mean): 93 (09 May 2019 00:39) (93 - 93)  RR: 20 (09 May 2019 07:16) (20 - 24)  SpO2: 97% (09 May 2019 07:16) (97% - 97%)    PHYSICAL EXAM:  Gen: Obese, NAD, resting in bed  HEENT: Normocephalic, atraumatic  Neck: supple, no lymphadenopathy  CV: Regular rate & regular rhythm  Lungs: decreased BS at bases, no fremitus  Abdomen: Soft, BS present  Ext: Warm, well perfused, +1 B/L LE edema with circumferential erythema, warmth at the ankles to mid calf bilaterally   Neuro: non focal, awake  Skin: no rash    TESTS & MEASUREMENTS:                        12.1   7.51  )-----------( 138      ( 08 May 2019 21:16 )             38.0     05-08    140  |  103  |  11  ----------------------------<  94  6.3<HH>   |  26  |  0.7    Ca    8.5      08 May 2019 21:16    TPro  5.9<L>  /  Alb  3.4<L>  /  TBili  0.5  /  DBili  x   /  AST  29  /  ALT  16  /  AlkPhos  74  05-08    eGFR if Non African American: 86 mL/min/1.73M2 (05-08-19 @ 21:16)  eGFR if African American: 100 mL/min/1.73M2 (05-08-19 @ 21:16)    LIVER FUNCTIONS - ( 08 May 2019 21:16 )  Alb: 3.4 g/dL / Pro: 5.9 g/dL / ALK PHOS: 74 U/L / ALT: 16 U/L / AST: 29 U/L / GGT: x           Lactate, Blood: 0.8 mmol/L (05-08-19 @ 21:16)      CARDIOLOGY TESTING  Transthoracic Echocardiogram:    EXAM:  2-D ECHO (TTE) COMPLETE      PROCEDURE DATE:  04/29/2019      INTERPRETATION:  REPORT:      Summary:   1. Mild-moderate tricuspid regurgitation.   2. Mild aortic regurgitation.     (04-29-19 @ 13:26)  12 Lead ECG:     QTC Calculation(Bezet) 441 ms      Diagnosis Line Normal sinus rhythm  Minimal voltage criteria for LVH, may be normal variant  T wave abnormality, consider inferior ischemia  Borderline ECG      MEDICATIONS  atorvastatin 40  buDESOnide 160 MICROgram(s)/formoterol 4.5 MICROgram(s) Inhaler 2  clindamycin IVPB 600  escitalopram 10  levothyroxine 150  losartan 25  montelukast 10      ANTIBIOTICS:  clindamycin IVPB 600 milliGRAM(s) IV Intermittent every 8 hours CYRUS STEINRED  74y, Female  Allergy: No Known Allergies    CHIEF COMPLAINT: lower extremity edema (09 May 2019 05:48)    HPI:74 year old female with pertinent medical history of venous insufficiency, Asthma, Hypertension, Ductal carcinoma s/p lumpectomy, Subsegmental Pulmonary Embolism on eliquis presents to the ED for evaluation of erythema of bilateral shins. She has had chronic edema of lower extremities, however, over the past 4 days ever since her discharge from the hospital she noticed worsening of erythema around bilateral shins prompting visit to the ED. The patient has noticed the edema to be associated with more warmth than normal and chills. She recently finished a course of doxycycline for cellulitis. She has no other complaints at this time. She was discharged last week to [facility] for rehab and has been participating in Rehab at that facility. (09 May 2019 05:48)    FAMILY HISTORY:    PAST MEDICAL & SURGICAL HISTORY:  Pulmonary embolism: on eliquis  OA (osteoarthritis)  Breast cancer: LEFT  PVD (peripheral vascular disease)  HTN (hypertension)  Asthma  S/P lumpectomy, left breast  H/O thyroidectomy: 2017    ROS  General: Denies fevers, chills, nightsweats, weight loss  HEENT: Denies headache, rhinorrhea, sore throat, eye pain  CV: Denies CP, palpitations  PULM: Denies SOB, cough  GI: Denies abdominal pain, diarrhea  : Denies dysuria, hematuria  MSK: Denies arthralgias  SKIN: Denies skin   NEURO: Denies paresthesias, weakness  PSYCH: Denies depression    VITALS:  T(F): 96.6, Max: 98.2 (05-08-19 @ 18:46)  HR: 64  BP: 164/88  RR: 20Vital Signs Last 24 Hrs  T(C): 35.9 (09 May 2019 07:16), Max: 36.8 (08 May 2019 18:46)  T(F): 96.6 (09 May 2019 07:16), Max: 98.2 (08 May 2019 18:46)  HR: 64 (09 May 2019 07:16) (64 - 96)  BP: 164/88 (09 May 2019 07:16) (129/67 - 164/88)  BP(mean): 93 (09 May 2019 00:39) (93 - 93)  RR: 20 (09 May 2019 07:16) (20 - 24)  SpO2: 97% (09 May 2019 07:16) (97% - 97%)    PHYSICAL EXAM:  Gen: Obese, NAD, resting in bed  HEENT: Normocephalic, atraumatic  Neck: supple, no lymphadenopathy  CV: Regular rate & regular rhythm  Lungs: decreased BS at bases, no fremitus  Abdomen: Soft, BS present  Ext: Warm, well perfused, +1 B/L LE edema with circumferential erythema, warmth at the ankles to mid calf bilaterally   Neuro: non focal, awake  Skin: no rash    TESTS & MEASUREMENTS:                        12.1   7.51  )-----------( 138      ( 08 May 2019 21:16 )             38.0     05-08    140  |  103  |  11  ----------------------------<  94  6.3<HH>   |  26  |  0.7    Ca    8.5      08 May 2019 21:16    TPro  5.9<L>  /  Alb  3.4<L>  /  TBili  0.5  /  DBili  x   /  AST  29  /  ALT  16  /  AlkPhos  74  05-08    eGFR if Non African American: 86 mL/min/1.73M2 (05-08-19 @ 21:16)  eGFR if African American: 100 mL/min/1.73M2 (05-08-19 @ 21:16)    LIVER FUNCTIONS - ( 08 May 2019 21:16 )  Alb: 3.4 g/dL / Pro: 5.9 g/dL / ALK PHOS: 74 U/L / ALT: 16 U/L / AST: 29 U/L / GGT: x           Lactate, Blood: 0.8 mmol/L (05-08-19 @ 21:16)      CARDIOLOGY TESTING  Transthoracic Echocardiogram:    EXAM:  2-D ECHO (TTE) COMPLETE      PROCEDURE DATE:  04/29/2019      INTERPRETATION:  REPORT:      Summary:   1. Mild-moderate tricuspid regurgitation.   2. Mild aortic regurgitation.     (04-29-19 @ 13:26)  12 Lead ECG:     QTC Calculation(Bezet) 441 ms    Diagnosis Line Normal sinus rhythm  Minimal voltage criteria for LVH, may be normal variant  T wave abnormality, consider inferior ischemia  Borderline ECG    MEDICATIONS  atorvastatin 40  buDESOnide 160 MICROgram(s)/formoterol 4.5 MICROgram(s) Inhaler 2  clindamycin IVPB 600  escitalopram 10  levothyroxine 150  losartan 25  montelukast 10    ANTIBIOTICS:  clindamycin IVPB 600 milliGRAM(s) IV Intermittent every 8 hours

## 2019-05-09 NOTE — CONSULT NOTE ADULT - ASSESSMENT
ASSESSMENT  74y F with PMH of PE, OA, Breast CA, PVD, HTN, Asthma admitted for cellulitis after failure of 7 days of outpatient Doxycycline therapy (last day of treatment 5/2/2019).     Sepsis ruled out on admission    Bilateral Lower extremity cellulitis with underlying stasis dermatitis   -s/p 7 days of Doxycycline 100 mg BID (ended 05/02)   -Duplex negative for DVT  -check nares for MRSA ASSESSMENT    74y F with PMH of PE, OA, Breast CA, PVD, HTN, Asthma admitted for cellulitis after failure of 7 days of outpatient Doxycycline therapy (last day of treatment 5/2/2019).     Sepsis ruled out on admission    Bilateral Lower extremity cellulitis, L>R with underlying stasis dermatitis   -s/p 7 days of Doxycycline 100 mg BID (ended 05/02)   -Duplex negative for DVT  -Clindamycin D/C'd, Doxycyline 100 mg BID for 1 week, Silvadene to affected area daily 74y F with PMH of PE, OA, Breast CA, PVD, HTN, Asthma admitted for cellulitis after failure of 7 days of outpatient Doxycycline therapy (last day of treatment 5/2/2019).     Sepsis ruled out on admission  Bilateral Lower extremity cellulitis, L>R with underlying stasis dermatitis   - s/p 7 days of Doxycycline 100 mg BID (ended 05/02)   - Duplex negative for DVT  - Clindamycin D/C'd  - Doxycyline 100 mg BID for 1 week, Silvadene to affected area daily

## 2019-05-09 NOTE — CONSULT NOTE ADULT - ATTENDING COMMENTS
I have personally examined the patient and reviewed the documentation above.  Corrections and edits were made wherever needed.   IMPRESSION:  Faint LLE cellulitis with no skin breakdown    RECOMMENDATIONS:  Po Doxycycline 100 mg q12h for 10 days  Local Silverdene cream to LEs q12h

## 2019-05-09 NOTE — PHYSICAL THERAPY INITIAL EVALUATION ADULT - SPECIFY REASON(S)
Pt. does not require skilled PT at this time, will discharge from acute care PT service. Pt. is modified independent with transfers and ambulation with rolling walker X 100 feet .

## 2019-05-09 NOTE — H&P ADULT - ATTENDING COMMENTS
74 year old female with pertinent medical history of venous insufficiency, Asthma, Hypertension, Ductal carcinoma s/p lumpectomy, Subsegmental Pulmonary Embolism on eliquis presents to the ED for evaluation of erythema of bilateral shins.     Pt seen and examined in [facility]- spoke with son at bedside    has mild HA; and erythema/warmth of LE- significantly worse from baseline    chart reviewed- above noted    IV abx    ID eval Dr Bety BELLA to chair    continue all meds  '  DC back to [facility] when cleared by ID- possibly today, even if needs to continue IV abx at NH

## 2019-05-10 ENCOUNTER — TRANSCRIPTION ENCOUNTER (OUTPATIENT)
Age: 74
End: 2019-05-10

## 2019-05-10 DIAGNOSIS — L03.90 CELLULITIS, UNSPECIFIED: ICD-10-CM

## 2019-05-10 DIAGNOSIS — I87.2 VENOUS INSUFFICIENCY (CHRONIC) (PERIPHERAL): ICD-10-CM

## 2019-05-10 LAB
ANION GAP SERPL CALC-SCNC: 10 MMOL/L — SIGNIFICANT CHANGE UP (ref 7–14)
BASOPHILS # BLD AUTO: 0.03 K/UL — SIGNIFICANT CHANGE UP (ref 0–0.2)
BASOPHILS NFR BLD AUTO: 0.7 % — SIGNIFICANT CHANGE UP (ref 0–1)
BUN SERPL-MCNC: 9 MG/DL — LOW (ref 10–20)
CALCIUM SERPL-MCNC: 8.3 MG/DL — LOW (ref 8.5–10.1)
CHLORIDE SERPL-SCNC: 106 MMOL/L — SIGNIFICANT CHANGE UP (ref 98–110)
CO2 SERPL-SCNC: 26 MMOL/L — SIGNIFICANT CHANGE UP (ref 17–32)
CREAT SERPL-MCNC: 0.6 MG/DL — LOW (ref 0.7–1.5)
EOSINOPHIL # BLD AUTO: 0.15 K/UL — SIGNIFICANT CHANGE UP (ref 0–0.7)
EOSINOPHIL NFR BLD AUTO: 3.3 % — SIGNIFICANT CHANGE UP (ref 0–8)
FERRITIN SERPL-MCNC: 231 NG/ML — HIGH (ref 15–150)
FOLATE SERPL-MCNC: 6.6 NG/ML — SIGNIFICANT CHANGE UP
GLUCOSE SERPL-MCNC: 84 MG/DL — SIGNIFICANT CHANGE UP (ref 70–99)
HCT VFR BLD CALC: 33.5 % — LOW (ref 37–47)
HGB BLD-MCNC: 10.7 G/DL — LOW (ref 12–16)
IMM GRANULOCYTES NFR BLD AUTO: 0.4 % — HIGH (ref 0.1–0.3)
LYMPHOCYTES # BLD AUTO: 0.88 K/UL — LOW (ref 1.2–3.4)
LYMPHOCYTES # BLD AUTO: 19.4 % — LOW (ref 20.5–51.1)
MAGNESIUM SERPL-MCNC: 2.1 MG/DL — SIGNIFICANT CHANGE UP (ref 1.8–2.4)
MCHC RBC-ENTMCNC: 30.1 PG — SIGNIFICANT CHANGE UP (ref 27–31)
MCHC RBC-ENTMCNC: 31.9 G/DL — LOW (ref 32–37)
MCV RBC AUTO: 94.1 FL — SIGNIFICANT CHANGE UP (ref 81–99)
MONOCYTES # BLD AUTO: 0.41 K/UL — SIGNIFICANT CHANGE UP (ref 0.1–0.6)
MONOCYTES NFR BLD AUTO: 9 % — SIGNIFICANT CHANGE UP (ref 1.7–9.3)
NEUTROPHILS # BLD AUTO: 3.05 K/UL — SIGNIFICANT CHANGE UP (ref 1.4–6.5)
NEUTROPHILS NFR BLD AUTO: 67.2 % — SIGNIFICANT CHANGE UP (ref 42.2–75.2)
NRBC # BLD: 0 /100 WBCS — SIGNIFICANT CHANGE UP (ref 0–0)
PLATELET # BLD AUTO: 134 K/UL — SIGNIFICANT CHANGE UP (ref 130–400)
POTASSIUM SERPL-MCNC: 4.5 MMOL/L — SIGNIFICANT CHANGE UP (ref 3.5–5)
POTASSIUM SERPL-SCNC: 4.5 MMOL/L — SIGNIFICANT CHANGE UP (ref 3.5–5)
RBC # BLD: 3.56 M/UL — LOW (ref 4.2–5.4)
RBC # FLD: 13.3 % — SIGNIFICANT CHANGE UP (ref 11.5–14.5)
SODIUM SERPL-SCNC: 142 MMOL/L — SIGNIFICANT CHANGE UP (ref 135–146)
TSH SERPL-MCNC: 1.95 UIU/ML — SIGNIFICANT CHANGE UP (ref 0.27–4.2)
VIT B12 SERPL-MCNC: 577 PG/ML — SIGNIFICANT CHANGE UP (ref 232–1245)
WBC # BLD: 4.54 K/UL — LOW (ref 4.8–10.8)
WBC # FLD AUTO: 4.54 K/UL — LOW (ref 4.8–10.8)

## 2019-05-10 RX ADMIN — BUDESONIDE AND FORMOTEROL FUMARATE DIHYDRATE 2 PUFF(S): 160; 4.5 AEROSOL RESPIRATORY (INHALATION) at 08:49

## 2019-05-10 RX ADMIN — Medication 110 MILLIGRAM(S): at 05:49

## 2019-05-10 RX ADMIN — MONTELUKAST 10 MILLIGRAM(S): 4 TABLET, CHEWABLE ORAL at 17:08

## 2019-05-10 RX ADMIN — LOSARTAN POTASSIUM 25 MILLIGRAM(S): 100 TABLET, FILM COATED ORAL at 05:48

## 2019-05-10 RX ADMIN — APIXABAN 5 MILLIGRAM(S): 2.5 TABLET, FILM COATED ORAL at 17:08

## 2019-05-10 RX ADMIN — ATORVASTATIN CALCIUM 40 MILLIGRAM(S): 80 TABLET, FILM COATED ORAL at 21:31

## 2019-05-10 RX ADMIN — APIXABAN 5 MILLIGRAM(S): 2.5 TABLET, FILM COATED ORAL at 05:48

## 2019-05-10 RX ADMIN — BUDESONIDE AND FORMOTEROL FUMARATE DIHYDRATE 2 PUFF(S): 160; 4.5 AEROSOL RESPIRATORY (INHALATION) at 20:36

## 2019-05-10 RX ADMIN — Medication 1 APPLICATION(S): at 17:08

## 2019-05-10 RX ADMIN — Medication 100 MILLIGRAM(S): at 15:01

## 2019-05-10 RX ADMIN — ESCITALOPRAM OXALATE 10 MILLIGRAM(S): 10 TABLET, FILM COATED ORAL at 17:08

## 2019-05-10 RX ADMIN — Medication 150 MICROGRAM(S): at 05:48

## 2019-05-10 RX ADMIN — Medication 100 MILLIGRAM(S): at 21:31

## 2019-05-10 NOTE — PROGRESS NOTE ADULT - SUBJECTIVE AND OBJECTIVE BOX
Pt is a 73yo F presents from the nursing home and has a PMHx of venous insufficiency, Asthma, Hypertension, Ductal carcinoma s/p lumpectomy, Subsegmental Pulmonary Embolism on eliquis presents with bilateral shin erythema. She has had chronic edema of lower extremities but over the past 4 days since her discharge from the hospital she noticed worsening of erythema around her shins bilaterally which caused her to come to the ED. There is associated warmth and she has been experiencing chills. She recently finished a course of doxycycline for cellulitis. She was discharged last week to [facility] for rehab and has been participating in Rehab at that facility.     INTERVAL HPI/OVERNIGHT EVENTS: No acute events overnight.      REVIEW OF SYSTEMS:   CONSTITUTIONAL: No fever, weight loss, or fatigue  All other review of systems negative, except as noted in HPI  ALLERY AND IMMUNOLOGIC: anesthesia, does not remember name      T(C): 36.1 (05-10-19 @ 05:10), Max: 36.2 (05-09-19 @ 21:00)  HR: 74 (05-10-19 @ 05:10) (74 - 91)  BP: 145/74 (05-10-19 @ 05:10) (118/71 - 145/74)  RR: 19 (05-10-19 @ 05:10) (19 - 20)  SpO2: 97% (05-09-19 @ 21:00) (97% - 97%)  Wt(kg): --Vital Signs Last 24 Hrs  T(C): 36.1 (10 May 2019 05:10), Max: 36.2 (09 May 2019 21:00)  T(F): 96.9 (10 May 2019 05:10), Max: 97.1 (09 May 2019 21:00)  HR: 74 (10 May 2019 05:10) (74 - 91)  BP: 145/74 (10 May 2019 05:10) (118/71 - 145/74)  BP(mean): --  RR: 19 (10 May 2019 05:10) (19 - 20)  SpO2: 97% (09 May 2019 21:00) (97% - 97%)    PHYSICAL EXAM:  GENERAL: NAD  LUNGS: distant breath sounds bilaterally   HEART: S1/S2 present. RRR.   ABD: Soft, non-tender, non-distended  EXT: pitting lower extremity edema with erythema an warmth, homans sign negative  NEURO: AAOX3    Consultant(s) Notes Reviewed:  [x ] YES  [ ] NO  Care Discussed with Consultants/Other Providers [ x] YES  [ ] NO    LABS:  Labs, Radiology, Cardiology, and Other Results: VITALS:   	T(F): 97.8  	HR: 69  	BP: 129/67  	RR: 24  	SpO2: 97%    	LABS:  	           	           12.1   	7.51  )-----------( 138      ( 08 May 2019 21:16 )  	           38.0     	05-08    	140  |  103  |  11  	----------------------------<  94  	6.3<HH>   |  26  |  0.7    	Ca    8.5      08 May 2019 21:16    	TPro  5.9<L>  /  Alb  3.4<L>  /  TBili  0.5  /  DBili  x   /  AST  29  /  ALT  16  /  AlkPhos  74  05-08      	Lactate, Blood: 0.8 mmol/L (05-08-19 @ 21:16)      RADIOLOGY & ADDITIONAL TESTS:    CT Angio Chest w/ IV Cont (04.27.19 @ 01:14) >  Subsegmental pulmonary emboli in the right lower lobe as described. No CT evidence of right heart strain.  Asymmetric left breast soft tissue density. Correlate with known history of breast cancer.      Imaging Personally Reviewed:  [X ] YES  [ ] NO  acetaminophen   Tablet .. 650 milliGRAM(s) Oral every 6 hours PRN  apixaban 5 milliGRAM(s) Oral every 12 hours  atorvastatin 40 milliGRAM(s) Oral at bedtime  buDESOnide 160 MICROgram(s)/formoterol 4.5 MICROgram(s) Inhaler 2 Puff(s) Inhalation two times a day  doxycycline hyclate Capsule 100 milliGRAM(s) Oral every 12 hours  doxycycline IVPB 100 milliGRAM(s) IV Intermittent every 12 hours  escitalopram 10 milliGRAM(s) Oral daily  levothyroxine 150 MICROGram(s) Oral daily  losartan 25 milliGRAM(s) Oral daily  montelukast 10 milliGRAM(s) Oral daily  silver sulfADIAZINE 1% Cream 1 Application(s) Topical daily  silver sulfADIAZINE 1% Cream 1 Application(s) Topical two times a day  zolpidem 5 milliGRAM(s) Oral at bedtime PRN      HEALTH ISSUES - PROBLEM Dx:  Pulmonary thromboembolism  Suspected deep vein thrombosis (DVT) Pt is a 73yo F presents from the nursing home and has a PMHx of venous insufficiency, Asthma, Hypertension, Ductal carcinoma s/p lumpectomy, Subsegmental Pulmonary Embolism on eliquis presents with bilateral shin erythema. She has had chronic edema of lower extremities but over the past 4 days since her discharge from the hospital she noticed worsening of erythema around her shins bilaterally which caused her to come to the ED. There is associated warmth and she has been experiencing chills. She recently finished a course of doxycycline for cellulitis. She was discharged last week to [facility] for rehab and has been participating in Rehab at that facility.     INTERVAL HPI/OVERNIGHT EVENTS: No acute events overnight.      REVIEW OF SYSTEMS:   CONSTITUTIONAL: No fever, weight loss, or fatigue  All other review of systems negative, except as noted in HPI  ALLERY AND IMMUNOLOGIC: anesthesia, does not remember name      T(C): 36.1 (05-10-19 @ 05:10), Max: 36.2 (05-09-19 @ 21:00)  HR: 74 (05-10-19 @ 05:10) (74 - 91)  BP: 145/74 (05-10-19 @ 05:10) (118/71 - 145/74)  RR: 19 (05-10-19 @ 05:10) (19 - 20)  SpO2: 97% (05-09-19 @ 21:00) (97% - 97%)  Wt(kg): --Vital Signs Last 24 Hrs  T(C): 36.1 (10 May 2019 05:10), Max: 36.2 (09 May 2019 21:00)  T(F): 96.9 (10 May 2019 05:10), Max: 97.1 (09 May 2019 21:00)  HR: 74 (10 May 2019 05:10) (74 - 91)  BP: 145/74 (10 May 2019 05:10) (118/71 - 145/74)  BP(mean): --  RR: 19 (10 May 2019 05:10) (19 - 20)  SpO2: 97% (09 May 2019 21:00) (97% - 97%)    PHYSICAL EXAM:  GENERAL: NAD  LUNGS: distant breath sounds bilaterally   HEART: S1/S2 present. RRR.   ABD: Soft, non-tender, non-distended  EXT: FROM in all 4 extremities, pitting lower extremity edema with erythema an warmth, Homans sign negative, + peripheral pulses   NEURO: AAOX3    Consultant(s) Notes Reviewed:  [x ] YES  [ ] NO  Care Discussed with Consultants/Other Providers [ x] YES  [ ] NO    LABS:  Labs, Radiology, Cardiology, and Other Results: VITALS:   	T(F): 97.8  	HR: 69  	BP: 129/67  	RR: 24  	SpO2: 97%    	LABS:  	           	           12.1   	7.51  )-----------( 138      ( 08 May 2019 21:16 )  	           38.0     	05-08    	140  |  103  |  11  	----------------------------<  94  	6.3<HH>   |  26  |  0.7    	Ca    8.5      08 May 2019 21:16    	TPro  5.9<L>  /  Alb  3.4<L>  /  TBili  0.5  /  DBili  x   /  AST  29  /  ALT  16  /  AlkPhos  74  05-08      	Lactate, Blood: 0.8 mmol/L (05-08-19 @ 21:16)      RADIOLOGY & ADDITIONAL TESTS:    CT Angio Chest w/ IV Cont (04.27.19 @ 01:14) >  Subsegmental pulmonary emboli in the right lower lobe as described. No CT evidence of right heart strain.  Asymmetric left breast soft tissue density. Correlate with known history of breast cancer.      Imaging Personally Reviewed:  [X ] YES  [ ] NO  acetaminophen   Tablet .. 650 milliGRAM(s) Oral every 6 hours PRN  apixaban 5 milliGRAM(s) Oral every 12 hours  atorvastatin 40 milliGRAM(s) Oral at bedtime  buDESOnide 160 MICROgram(s)/formoterol 4.5 MICROgram(s) Inhaler 2 Puff(s) Inhalation two times a day  doxycycline hyclate Capsule 100 milliGRAM(s) Oral every 12 hours  doxycycline IVPB 100 milliGRAM(s) IV Intermittent every 12 hours  escitalopram 10 milliGRAM(s) Oral daily  levothyroxine 150 MICROGram(s) Oral daily  losartan 25 milliGRAM(s) Oral daily  montelukast 10 milliGRAM(s) Oral daily  silver sulfADIAZINE 1% Cream 1 Application(s) Topical daily  silver sulfADIAZINE 1% Cream 1 Application(s) Topical two times a day  zolpidem 5 milliGRAM(s) Oral at bedtime PRN      HEALTH ISSUES - PROBLEM Dx:  Pulmonary thromboembolism  Suspected deep vein thrombosis (DVT) Pt is a 75yo F presents from the nursing home and has a PMHx of venous insufficiency, Asthma, Hypertension, Ductal carcinoma s/p lumpectomy, Subsegmental Pulmonary Embolism on eliquis presents with bilateral shin erythema. She has had chronic edema of lower extremities but over the past 4 days since her discharge from the hospital she noticed worsening of erythema around her shins bilaterally which caused her to come to the ED. There is associated warmth and she has been experiencing chills. She recently finished a course of doxycycline for cellulitis. She had a URI in the past week. She was discharged last week to [facility] for rehab and has been participating in Rehab at that facility.   Pts daughters contact number is: [phone]    INTERVAL HPI/OVERNIGHT EVENTS: No acute events overnight.    Past surgical history: Sclerotherapy done by Dr. Rd Morley.       REVIEW OF SYSTEMS:   CONSTITUTIONAL: No fever, weight loss, or fatigue  All other review of systems negative, except as noted in HPI  ALLERY AND IMMUNOLOGIC: anesthesia, does not remember name      T(C): 36.1 (05-10-19 @ 05:10), Max: 36.2 (05-09-19 @ 21:00)  HR: 74 (05-10-19 @ 05:10) (74 - 91)  BP: 145/74 (05-10-19 @ 05:10) (118/71 - 145/74)  RR: 19 (05-10-19 @ 05:10) (19 - 20)  SpO2: 97% (05-09-19 @ 21:00) (97% - 97%)  Wt(kg): --Vital Signs Last 24 Hrs  T(C): 36.1 (10 May 2019 05:10), Max: 36.2 (09 May 2019 21:00)  T(F): 96.9 (10 May 2019 05:10), Max: 97.1 (09 May 2019 21:00)  HR: 74 (10 May 2019 05:10) (74 - 91)  BP: 145/74 (10 May 2019 05:10) (118/71 - 145/74)  BP(mean): --  RR: 19 (10 May 2019 05:10) (19 - 20)  SpO2: 97% (09 May 2019 21:00) (97% - 97%)    PHYSICAL EXAM:  GENERAL: NAD  LUNGS: distant breath sounds bilaterally   HEART: S1/S2 present. RRR.   ABD: Soft, non-tender, non-distended  EXT: FROM in all 4 extremities, pitting lower extremity edema with erythema an warmth, Homans sign negative, + peripheral pulses   NEURO: AAOX3    Consultant(s) Notes Reviewed:  [x ] YES  [ ] NO  Care Discussed with Consultants/Other Providers [ x] YES  [ ] NO    LABS:  Labs, Radiology, Cardiology, and Other Results: VITALS:   	T(F): 97.8  	HR: 69  	BP: 129/67  	RR: 24  	SpO2: 97%    	LABS:  	           	           12.1   	7.51  )-----------( 138      ( 08 May 2019 21:16 )  	           38.0     	05-08    	140  |  103  |  11  	----------------------------<  94  	6.3<HH>   |  26  |  0.7    	Ca    8.5      08 May 2019 21:16    	TPro  5.9<L>  /  Alb  3.4<L>  /  TBili  0.5  /  DBili  x   /  AST  29  /  ALT  16  /  AlkPhos  74  05-08      	Lactate, Blood: 0.8 mmol/L (05-08-19 @ 21:16)      RADIOLOGY & ADDITIONAL TESTS:    CT Angio Chest w/ IV Cont (04.27.19 @ 01:14) >  Subsegmental pulmonary emboli in the right lower lobe as described. No CT evidence of right heart strain.  Asymmetric left breast soft tissue density. Correlate with known history of breast cancer.      Imaging Personally Reviewed:  [X ] YES  [ ] NO  acetaminophen   Tablet .. 650 milliGRAM(s) Oral every 6 hours PRN  apixaban 5 milliGRAM(s) Oral every 12 hours  atorvastatin 40 milliGRAM(s) Oral at bedtime  buDESOnide 160 MICROgram(s)/formoterol 4.5 MICROgram(s) Inhaler 2 Puff(s) Inhalation two times a day  doxycycline hyclate Capsule 100 milliGRAM(s) Oral every 12 hours  doxycycline IVPB 100 milliGRAM(s) IV Intermittent every 12 hours  escitalopram 10 milliGRAM(s) Oral daily  levothyroxine 150 MICROGram(s) Oral daily  losartan 25 milliGRAM(s) Oral daily  montelukast 10 milliGRAM(s) Oral daily  silver sulfADIAZINE 1% Cream 1 Application(s) Topical daily  silver sulfADIAZINE 1% Cream 1 Application(s) Topical two times a day  zolpidem 5 milliGRAM(s) Oral at bedtime PRN      HEALTH ISSUES - PROBLEM Dx:  Pulmonary thromboembolism  Suspected deep vein thrombosis (DVT)

## 2019-05-10 NOTE — PROGRESS NOTE ADULT - SUBJECTIVE AND OBJECTIVE BOX
SARITA, EL  74y, Female      OVERNIGHT EVENTS:    no fevers, no overt pain LLE    VITALS:  T(F): 96.9, Max: 97.1 (05-09-19 @ 21:00)  HR: 74  BP: 145/74  RR: 19Vital Signs Last 24 Hrs  T(C): 36.1 (10 May 2019 05:10), Max: 36.2 (09 May 2019 21:00)  T(F): 96.9 (10 May 2019 05:10), Max: 97.1 (09 May 2019 21:00)  HR: 74 (10 May 2019 05:10) (74 - 91)  BP: 145/74 (10 May 2019 05:10) (118/71 - 145/74)  BP(mean): --  RR: 19 (10 May 2019 05:10) (19 - 20)  SpO2: 97% (09 May 2019 21:00) (97% - 97%)    TESTS & MEASUREMENTS:                        10.7   4.54  )-----------( 134      ( 10 May 2019 07:20 )             33.5     05-10    142  |  106  |  9<L>  ----------------------------<  84  4.5   |  26  |  0.6<L>    Ca    8.3<L>      10 May 2019 07:20  Phos  4.1     05-09  Mg     2.1     05-10    TPro  5.5<L>  /  Alb  3.6  /  TBili  0.5  /  DBili  <0.2  /  AST  13  /  ALT  14  /  AlkPhos  77  05-09    LIVER FUNCTIONS - ( 09 May 2019 11:34 )  Alb: 3.6 g/dL / Pro: 5.5 g/dL / ALK PHOS: 77 U/L / ALT: 14 U/L / AST: 13 U/L / GGT: x                   RADIOLOGY & ADDITIONAL TESTS:    ANTIBIOTICS:  doxycycline hyclate Capsule 100 milliGRAM(s) Oral every 12 hours  doxycycline IVPB 100 milliGRAM(s) IV Intermittent every 12 hours

## 2019-05-10 NOTE — DISCHARGE NOTE NURSING/CASE MANAGEMENT/SOCIAL WORK - NSDCDPATPORTLINK_GEN_ALL_CORE
You can access the [facility] Patient Portal, offered by [facility], by registering with the following website: [email or web address]

## 2019-05-10 NOTE — PROGRESS NOTE ADULT - PROBLEM SELECTOR PLAN 1
R/o chronic venous stasis as cause  -B/L lower extremity erythema on shins, warm to touch  -Pitting lower extremity edema present  -Was on clindamycin 600 mg every 8 hours  -Recent duplex was negative for DVT  -Per ID pt is stable for discharge, can continue Abx in nursing home R/O cellulitis  -B/L lower extremity erythema on shins, warm to touch  -Pitting lower extremity edema present  -Was on clindamycin 600 mg every 8 hours  -Recent duplex was negative for DVT  -Per ID pt is stable for discharge, can continue Abx, doxycycline in nursing home R/O cellulitis  -B/L lower extremity erythema on shins, warm to touch  -Pitting lower extremity edema present  -on clindamycin 600 mg every 8 hours  -Recent duplex was negative for DVT  -Per ID pt is stable for discharge, can continue Abx, doxycycline in nursing home  -Primary care Doctor wants to continue IV antibiotics, will continue clindamycin IV

## 2019-05-10 NOTE — PROGRESS NOTE ADULT - ASSESSMENT
Pt is a 75 yo F with a significant PMHx as listed above presents with B/L shin erythema and edema. Chronic venous stasis vs R/O possible cellulitis.
bilateral cellulitis  stasis dermatitis    continue iv abx  id fu  would not change to po at present
IMPRESSION:  ResolvingfFaint LLE cellulitis with no skin breakdown    RECOMMENDATIONS:  Po Doxycycline 100 mg q12h for 10 days  Local Silverdene cream to LEs q12h .   recall prn please

## 2019-05-10 NOTE — PROGRESS NOTE ADULT - SUBJECTIVE AND OBJECTIVE BOX
Patient was seen and examined. Spoke with RN. Chart reviewed.    No events overnight.  Vital Signs Last 24 Hrs  T(F): 96.9 (10 May 2019 05:10), Max: 97.1 (09 May 2019 21:00)  HR: 74 (10 May 2019 05:10) (74 - 91)  BP: 145/74 (10 May 2019 05:10) (118/71 - 145/74)  SpO2: 97% (09 May 2019 21:00) (97% - 97%)  MEDICATIONS  (STANDING):  apixaban 5 milliGRAM(s) Oral every 12 hours  atorvastatin 40 milliGRAM(s) Oral at bedtime  buDESOnide 160 MICROgram(s)/formoterol 4.5 MICROgram(s) Inhaler 2 Puff(s) Inhalation two times a day  doxycycline hyclate Capsule 100 milliGRAM(s) Oral every 12 hours  doxycycline IVPB 100 milliGRAM(s) IV Intermittent every 12 hours  escitalopram 10 milliGRAM(s) Oral daily  levothyroxine 150 MICROGram(s) Oral daily  losartan 25 milliGRAM(s) Oral daily  montelukast 10 milliGRAM(s) Oral daily  silver sulfADIAZINE 1% Cream 1 Application(s) Topical daily  silver sulfADIAZINE 1% Cream 1 Application(s) Topical two times a day    MEDICATIONS  (PRN):  acetaminophen   Tablet .. 650 milliGRAM(s) Oral every 6 hours PRN Temp greater or equal to 38C (100.4F), Moderate Pain (4 - 6)  zolpidem 5 milliGRAM(s) Oral at bedtime PRN Insomnia    Labs:                        10.7   4.54  )-----------( 134      ( 10 May 2019 07:20 )             33.5                         11.3   5.04  )-----------( 131      ( 09 May 2019 11:34 )             35.9     10 May 2019 07:20    142    |  106    |  9      ----------------------------<  84     4.5     |  26     |  0.6    09 May 2019 17:18    143    |  105    |  10     ----------------------------<  90     4.6     |  27     |  0.7      Ca    8.3        10 May 2019 07:20  Ca    8.6        09 May 2019 17:18  Phos  4.1       09 May 2019 11:34  Mg     2.1       10 May 2019 07:20  Mg     2.1       09 May 2019 11:34    TPro  5.5    /  Alb  3.6    /  TBili  0.5    /  DBili  <0.2   /  AST  13     /  ALT  14     /  AlkPhos  77     09 May 2019 11:34  TPro  5.9    /  Alb  3.4    /  TBili  0.5    /  DBili  x      /  AST  29     /  ALT  16     /  AlkPhos  74     08 May 2019 21:16    PT/INR - ( 09 May 2019 11:34 )   PT: 11.30 sec;   INR: 0.98 ratio         PTT - ( 09 May 2019 11:34 )  PTT:34.8 sec        Radiology:    General: comfortable, NAD  Neurology: A&Ox3, nonfocal  Head:  Normocephalic, atraumatic  ENT:  Mucosa moist, no ulcerations  Neck:  Supple, no JVD,   Skin: no breakdown  Resp: CTA B/L  CV: RRR, S1S2,   GI: Soft, NT, bowel sounds  MS:bilateral erythema/ tenderedema, + peripheral pulses, FROM all 4 extremity

## 2019-05-10 NOTE — PROGRESS NOTE ADULT - PROBLEM SELECTOR PLAN 2
-B/L lower extremity erythema on shins, warm to touch  -Pitting lower extremity edema present  -Was on clindamycin 600 mg every 8 hours  -Recent duplex was negative for DVT  -Per ID pt is stable for discharge On IV clindamycin 600mg q8 Hours

## 2019-05-11 ENCOUNTER — TRANSCRIPTION ENCOUNTER (OUTPATIENT)
Age: 74
End: 2019-05-11

## 2019-05-11 VITALS
HEART RATE: 70 BPM | SYSTOLIC BLOOD PRESSURE: 135 MMHG | TEMPERATURE: 97 F | DIASTOLIC BLOOD PRESSURE: 79 MMHG | RESPIRATION RATE: 18 BRPM

## 2019-05-11 LAB
ALBUMIN SERPL ELPH-MCNC: 3.3 G/DL — LOW (ref 3.5–5.2)
ALP SERPL-CCNC: 72 U/L — SIGNIFICANT CHANGE UP (ref 30–115)
ALT FLD-CCNC: 12 U/L — SIGNIFICANT CHANGE UP (ref 0–41)
ANION GAP SERPL CALC-SCNC: 12 MMOL/L — SIGNIFICANT CHANGE UP (ref 7–14)
AST SERPL-CCNC: 12 U/L — SIGNIFICANT CHANGE UP (ref 0–41)
BILIRUB SERPL-MCNC: 0.5 MG/DL — SIGNIFICANT CHANGE UP (ref 0.2–1.2)
BUN SERPL-MCNC: 10 MG/DL — SIGNIFICANT CHANGE UP (ref 10–20)
CALCIUM SERPL-MCNC: 8.3 MG/DL — LOW (ref 8.5–10.1)
CHLORIDE SERPL-SCNC: 107 MMOL/L — SIGNIFICANT CHANGE UP (ref 98–110)
CO2 SERPL-SCNC: 23 MMOL/L — SIGNIFICANT CHANGE UP (ref 17–32)
CREAT SERPL-MCNC: 0.6 MG/DL — LOW (ref 0.7–1.5)
GLUCOSE SERPL-MCNC: 85 MG/DL — SIGNIFICANT CHANGE UP (ref 70–99)
HCT VFR BLD CALC: 34.3 % — LOW (ref 37–47)
HGB BLD-MCNC: 10.8 G/DL — LOW (ref 12–16)
MCHC RBC-ENTMCNC: 29.8 PG — SIGNIFICANT CHANGE UP (ref 27–31)
MCHC RBC-ENTMCNC: 31.5 G/DL — LOW (ref 32–37)
MCV RBC AUTO: 94.8 FL — SIGNIFICANT CHANGE UP (ref 81–99)
NRBC # BLD: 0 /100 WBCS — SIGNIFICANT CHANGE UP (ref 0–0)
PLATELET # BLD AUTO: 137 K/UL — SIGNIFICANT CHANGE UP (ref 130–400)
POTASSIUM SERPL-MCNC: 4.2 MMOL/L — SIGNIFICANT CHANGE UP (ref 3.5–5)
POTASSIUM SERPL-SCNC: 4.2 MMOL/L — SIGNIFICANT CHANGE UP (ref 3.5–5)
PROT SERPL-MCNC: 5.3 G/DL — LOW (ref 6–8)
RBC # BLD: 3.62 M/UL — LOW (ref 4.2–5.4)
RBC # FLD: 13.3 % — SIGNIFICANT CHANGE UP (ref 11.5–14.5)
SODIUM SERPL-SCNC: 142 MMOL/L — SIGNIFICANT CHANGE UP (ref 135–146)
WBC # BLD: 4.89 K/UL — SIGNIFICANT CHANGE UP (ref 4.8–10.8)
WBC # FLD AUTO: 4.89 K/UL — SIGNIFICANT CHANGE UP (ref 4.8–10.8)

## 2019-05-11 RX ADMIN — Medication 150 MICROGRAM(S): at 06:26

## 2019-05-11 RX ADMIN — Medication 650 MILLIGRAM(S): at 08:00

## 2019-05-11 RX ADMIN — Medication 1 APPLICATION(S): at 06:26

## 2019-05-11 RX ADMIN — MONTELUKAST 10 MILLIGRAM(S): 4 TABLET, CHEWABLE ORAL at 11:25

## 2019-05-11 RX ADMIN — Medication 1 APPLICATION(S): at 11:24

## 2019-05-11 RX ADMIN — Medication 100 MILLIGRAM(S): at 06:25

## 2019-05-11 RX ADMIN — APIXABAN 5 MILLIGRAM(S): 2.5 TABLET, FILM COATED ORAL at 06:26

## 2019-05-11 RX ADMIN — LOSARTAN POTASSIUM 25 MILLIGRAM(S): 100 TABLET, FILM COATED ORAL at 06:25

## 2019-05-11 RX ADMIN — Medication 650 MILLIGRAM(S): at 06:30

## 2019-05-11 RX ADMIN — BUDESONIDE AND FORMOTEROL FUMARATE DIHYDRATE 2 PUFF(S): 160; 4.5 AEROSOL RESPIRATORY (INHALATION) at 10:03

## 2019-05-11 NOTE — DISCHARGE NOTE PROVIDER - NSDCCPCAREPLAN_GEN_ALL_CORE_FT
PRINCIPAL DISCHARGE DIAGNOSIS  Diagnosis: Cellulitis  Assessment and Plan of Treatment: Please take your doxycylcine 100mg oral tablets two times a day for the next ten days. You are to  utilize the silvadene cream to your lower extremities twice a day. Please follow up with your doctor.

## 2019-05-11 NOTE — DISCHARGE NOTE PROVIDER - PROVIDER TOKENS
FREE:[LAST:[Teresita],FIRST:[[place]],PHONE:[phone]],FAX:[phone]   [phone]    -],ADDRESS:[[address] [place] [address]]]

## 2019-05-11 NOTE — DISCHARGE NOTE PROVIDER - HOSPITAL COURSE
74 year old female with pertinent medical history of venous insufficiency, Asthma, Hypertension, Ductal carcinoma s/p lumpectomy, Subsegmental Pulmonary Embolism on eliquis presents to the ED for evaluation of erythema of bilateral shins. She has had chronic edema of lower extremities, however, over the past 4 days ever since her discharge from the hospital she noticed worsening of erythema around bilateral shins prompting visit to the ED. The patient has noticed the edema to be associated with more warmth than normal and chills. She recently finished a course of doxycycline for cellulitis. She has no other complaints at this time. She was discharged last week to [facility] for rehab and has been participating in Rehab at that facility.         In the hospital she was placed on clindamycin 600mg IV and seen by ID who advised resolving faint LLE cellulitis with no breakdown. She can be switched to PO for discharge. She was discharged home with Silvadene prescription and Doxycycline 100mg BID for 10 days.

## 2019-05-12 ENCOUNTER — TRANSCRIPTION ENCOUNTER (OUTPATIENT)
Age: 74
End: 2019-05-12

## 2019-05-14 LAB
CULTURE RESULTS: SIGNIFICANT CHANGE UP
SPECIMEN SOURCE: SIGNIFICANT CHANGE UP

## 2019-05-15 DIAGNOSIS — Z85.3 PERSONAL HISTORY OF MALIGNANT NEOPLASM OF BREAST: ICD-10-CM

## 2019-05-15 DIAGNOSIS — L03.116 CELLULITIS OF LEFT LOWER LIMB: ICD-10-CM

## 2019-05-15 DIAGNOSIS — I08.1 RHEUMATIC DISORDERS OF BOTH MITRAL AND TRICUSPID VALVES: ICD-10-CM

## 2019-05-15 DIAGNOSIS — L03.115 CELLULITIS OF RIGHT LOWER LIMB: ICD-10-CM

## 2019-05-15 DIAGNOSIS — E89.0 POSTPROCEDURAL HYPOTHYROIDISM: ICD-10-CM

## 2019-05-15 DIAGNOSIS — M19.90 UNSPECIFIED OSTEOARTHRITIS, UNSPECIFIED SITE: ICD-10-CM

## 2019-05-15 DIAGNOSIS — Z86.711 PERSONAL HISTORY OF PULMONARY EMBOLISM: ICD-10-CM

## 2019-05-15 DIAGNOSIS — I73.9 PERIPHERAL VASCULAR DISEASE, UNSPECIFIED: ICD-10-CM

## 2019-05-15 DIAGNOSIS — I87.2 VENOUS INSUFFICIENCY (CHRONIC) (PERIPHERAL): ICD-10-CM

## 2019-05-15 DIAGNOSIS — I87.8 OTHER SPECIFIED DISORDERS OF VEINS: ICD-10-CM

## 2019-05-15 DIAGNOSIS — I10 ESSENTIAL (PRIMARY) HYPERTENSION: ICD-10-CM

## 2019-05-16 PROBLEM — J45.909 UNSPECIFIED ASTHMA, UNCOMPLICATED: Chronic | Status: ACTIVE | Noted: 2019-03-20

## 2019-05-23 ENCOUNTER — APPOINTMENT (OUTPATIENT)
Dept: CARDIOLOGY | Facility: CLINIC | Age: 74
End: 2019-05-23
Payer: MEDICARE

## 2019-05-23 VITALS — SYSTOLIC BLOOD PRESSURE: 130 MMHG | HEART RATE: 76 BPM | RESPIRATION RATE: 18 BRPM | DIASTOLIC BLOOD PRESSURE: 80 MMHG

## 2019-05-23 VITALS — WEIGHT: 291 LBS | BODY MASS INDEX: 46.77 KG/M2 | HEIGHT: 66 IN

## 2019-05-23 DIAGNOSIS — Z82.49 FAMILY HISTORY OF ISCHEMIC HEART DISEASE AND OTHER DISEASES OF THE CIRCULATORY SYSTEM: ICD-10-CM

## 2019-05-23 DIAGNOSIS — Z78.9 OTHER SPECIFIED HEALTH STATUS: ICD-10-CM

## 2019-05-23 PROCEDURE — 99204 OFFICE O/P NEW MOD 45 MIN: CPT

## 2019-05-23 PROCEDURE — 93000 ELECTROCARDIOGRAM COMPLETE: CPT

## 2019-05-23 RX ORDER — ZOLPIDEM TARTRATE 10 MG/1
10 TABLET ORAL DAILY
Refills: 0 | Status: ACTIVE | COMMUNITY

## 2019-05-23 RX ORDER — MONTELUKAST 10 MG/1
10 TABLET, FILM COATED ORAL DAILY
Refills: 0 | Status: ACTIVE | COMMUNITY

## 2019-05-23 RX ORDER — LOSARTAN POTASSIUM 25 MG/1
25 TABLET, FILM COATED ORAL DAILY
Refills: 0 | Status: ACTIVE | COMMUNITY

## 2019-05-23 RX ORDER — ALBUTEROL SULFATE 90 UG/1
AEROSOL, METERED RESPIRATORY (INHALATION)
Refills: 0 | Status: ACTIVE | COMMUNITY

## 2019-05-23 NOTE — PHYSICAL EXAM
[General Appearance - Well Developed] : well developed [Normal Appearance] : normal appearance [Well Groomed] : well groomed [General Appearance - Well Nourished] : well nourished [No Deformities] : no deformities [General Appearance - In No Acute Distress] : no acute distress [Normal Conjunctiva] : the conjunctiva exhibited no abnormalities [Eyelids - No Xanthelasma] : the eyelids demonstrated no xanthelasmas [Normal Oral Mucosa] : normal oral mucosa [No Oral Pallor] : no oral pallor [No Oral Cyanosis] : no oral cyanosis [Normal Jugular Venous A Waves Present] : normal jugular venous A waves present [Normal Jugular Venous V Waves Present] : normal jugular venous V waves present [No Jugular Venous Schmitt A Waves] : no jugular venous schmitt A waves [Heart Rate And Rhythm] : heart rate and rhythm were normal [Heart Sounds] : normal S1 and S2 [Murmurs] : no murmurs present [Respiration, Rhythm And Depth] : normal respiratory rhythm and effort [Exaggerated Use Of Accessory Muscles For Inspiration] : no accessory muscle use [Auscultation Breath Sounds / Voice Sounds] : lungs were clear to auscultation bilaterally [Bowel Sounds] : normal bowel sounds [Abdomen Soft] : soft [Abdomen Tenderness] : non-tender [] : no hepato-splenomegaly [Abdomen Mass (___ Cm)] : no abdominal mass palpated [Abnormal Walk] : normal gait [FreeTextEntry1] : venous stasis [Oriented To Time, Place, And Person] : oriented to person, place, and time

## 2019-05-24 ENCOUNTER — APPOINTMENT (OUTPATIENT)
Dept: VASCULAR SURGERY | Facility: CLINIC | Age: 74
End: 2019-05-24
Payer: MEDICARE

## 2019-05-24 VITALS
DIASTOLIC BLOOD PRESSURE: 85 MMHG | BODY MASS INDEX: 46.77 KG/M2 | SYSTOLIC BLOOD PRESSURE: 130 MMHG | OXYGEN SATURATION: 96 % | HEIGHT: 66 IN | WEIGHT: 291 LBS | HEART RATE: 75 BPM

## 2019-05-24 PROCEDURE — 29580 STRAPPING UNNA BOOT: CPT | Mod: 50

## 2019-05-24 PROCEDURE — 93970 EXTREMITY STUDY: CPT

## 2019-05-24 NOTE — CONSULT LETTER
[Dear  ___] : Dear  [unfilled], [Consult Letter:] : I had the pleasure of evaluating your patient, [unfilled]. [Please see my note below.] : Please see my note below. [Sincerely,] : Sincerely,

## 2019-05-24 NOTE — REVIEW OF SYSTEMS
[Vomiting] : vomiting [Fever] : no fever [Chills] : no chills [Shortness Of Breath] : no shortness of breath [Abdominal Pain] : no abdominal pain [Constipation] : no constipation [Diarrhea] : no diarrhea [Dizziness] : no dizziness

## 2019-05-29 ENCOUNTER — APPOINTMENT (OUTPATIENT)
Dept: HEMATOLOGY ONCOLOGY | Facility: CLINIC | Age: 74
End: 2019-05-29

## 2019-05-29 VITALS
SYSTOLIC BLOOD PRESSURE: 140 MMHG | HEIGHT: 66 IN | BODY MASS INDEX: 46.61 KG/M2 | HEART RATE: 101 BPM | TEMPERATURE: 97.2 F | DIASTOLIC BLOOD PRESSURE: 86 MMHG | WEIGHT: 290 LBS

## 2019-05-29 DIAGNOSIS — Z86.711 PERSONAL HISTORY OF PULMONARY EMBOLISM: ICD-10-CM

## 2019-05-31 ENCOUNTER — APPOINTMENT (OUTPATIENT)
Dept: VASCULAR SURGERY | Facility: CLINIC | Age: 74
End: 2019-05-31
Payer: MEDICARE

## 2019-05-31 PROCEDURE — 99214 OFFICE O/P EST MOD 30 MIN: CPT

## 2019-06-07 ENCOUNTER — APPOINTMENT (OUTPATIENT)
Dept: VASCULAR SURGERY | Facility: CLINIC | Age: 74
End: 2019-06-07

## 2019-06-09 NOTE — HISTORY OF PRESENT ILLNESS
[de-identified] : 75 yo female is referred by Dr. Stewart for consultation of systemic adjuvant therapy for newly diagnosed left sided breast cancer. The patient had a screening mammogram on 12/13/2018 where segmental calcifications were identified in the left breast at 12:00 position middle depth. She was subsequently recalled for a diagnostic mammogram, performed on 1/22/2019. Spot magnification views demonstrated a 6 cm area of segmental amorphous calcifications in the left breast at 12:00 position middle depth, for which a stereotactic biopsy was recommended. \par \par A stereotactic biopsy performed on 1/22/2019 demonstrated invasive moderately differentiated ductal carcinoma and sclerosing intraductal papilloma at the most anterior aspect of the biopsied calcifications and fibroadenomatoid change at the most posterior aspect of the biopsied calcifications. \par Estrogen receptor positive %\par Progesterone receptor positive %\par HER2 negative Negative 0\par Ki-67: 3%\par \par On 2/23/19, b/l breast MRI showed:\par RIGHT BREAST: \par Slightly irregular, linear nonmass enhancement in the central aspect of the right breast measures approximately 0.7 cm. MRI guided biopsy is recommended. No additional suspicious abnormal enhancement is seen in the right breast. No right axillary adenopathy. \par LEFT BREAST: \par 2 biopsy clips are seen in the superior aspect of the left breast. Mild patchy nonmass enhancement is seen surrounding the biopsy clips; linear nonmass enhancement is seen along the anterior and lateral aspect of the superior biopsy clip measuring approximately 1.8 cm. These correspond to the calcifications seen on the mammogram and to the biopsy-proven carcinoma. \par \par On 3/7/19, MRI guided core biopsy of right nonmass enhancement showed proliferative type fibrocystic changes. \par \par On 4/3/19, she underwent Left UOQ NLOC and Left SLNB. The pathology demonstrated 20 mm invasive well differentiated IDC and non extensive DCIS solid and cribriform types with comedo necrosis and calcifications low to intermediate nuclear grade; no LVI is seen; with negative margins. 0/2 (-) SLNB; AJCC 8th Edition Pathologic Stage: pT1c, p(sn)N0, pMx. \par \par The surgical specimen was sent for Oncotype dx analysis. her RS is 6, in the low risk range and predicting 3% risk of distant recurrence with endocrine therapy.\par \par The patient recovered well from surgery. She is here with her daughter to discuss systemic adjuvant therapy. \par \par Her PMH is significant for subsegmental PE found in 4/2019. She has been taking Eliquis. She has b/l leg venous insufficiency. \par \par She has no family history of breast cancer and ovarian cancer. \par

## 2019-06-09 NOTE — PHYSICAL EXAM
[Restricted in physically strenuous activity but ambulatory and able to carry out work of a light or sedentary nature] : Status 1- Restricted in physically strenuous activity but ambulatory and able to carry out work of a light or sedentary nature, e.g., light house work, office work [Normal] : affect appropriate [de-identified] : Status post left breast lumpectomy and SLNB. The surgical scars are healing well.

## 2019-06-09 NOTE — CONSULT LETTER
[Dear  ___] : Dear  [unfilled], [Please see my note below.] : Please see my note below. [Consult Closing:] : Thank you very much for allowing me to participate in the care of this patient.  If you have any questions, please do not hesitate to contact me. [Consult Letter:] : I had the pleasure of evaluating your patient, [unfilled]. [DrBrittany  ___] : Dr. LOGAN [Sincerely,] : Sincerely, [FreeTextEntry3] : Herman Flores MD

## 2019-06-09 NOTE — ASSESSMENT
[FreeTextEntry1] : 73 yo female has stage IA ([id number]) IDC of the left breast, G1, ER/GA positive, Her-2 negative, s/p left breast lumpectomy and SLNB.\par RS 6.\par \par Recommendation:\par We had a detailed discussion regarding to her diagnosis, stage, prognosis and adjuvant systemic therapy. The pathology report and Oncotype Dx analysis were reviewed. The patient has early stage hormone receptor positive breast cancer with favorable pathologic features (G1, negative SLN, -LVI, Her-2 -). Her RS is in the low risk range, predicting 3% risk of distant recurrence with adjuvant endocrine therapy alone. Based on the data from TAILORx trial, adjuvant chemotherapy will not give additional benefit. She is recommended for adjuvant endocrine therapy with an aromatase inhibitor. \par \par We reviewed benefit and side effects of adjuvant endocrine therapy.  Anastrozole is recommended. The potential sided effects may include but not limit to muscular and skeletal symptoms, arthralgia, increasing osteopenia and osteoporosis, a small increased risk of cardiovascular events and slight increase of hyperlipidemia. \par \par She had provoked segmental PE in 4/2019 and has been on Eliquis. She should continue anticoagulation for at least 6 months and should have followup CTA chest.\par \par We discussed bone health management. She is recommended to take calcium and vitamine d supplement, and physical activities. She will be referred her for bone density. We will make additional recommendation based on her bone density.\par \par Regarding adjuvant breast radiotherapy, she will see Dr. Yanez for consultation. \par \par She will have surveillance mammogram of left breast every 6 months and right side annually.\par \par All questions were answered appropriately. The patient agrees with the plan. A prescription for Anastrozole was sent to her pharmacy.\par \par RTO for followup in 3 months.\par \par \par \par

## 2019-06-11 ENCOUNTER — APPOINTMENT (OUTPATIENT)
Dept: BREAST CENTER | Facility: CLINIC | Age: 74
End: 2019-06-11

## 2019-06-12 ENCOUNTER — FORM ENCOUNTER (OUTPATIENT)
Age: 74
End: 2019-06-12

## 2019-06-13 ENCOUNTER — APPOINTMENT (OUTPATIENT)
Dept: BREAST CENTER | Facility: CLINIC | Age: 74
End: 2019-06-13
Payer: MEDICARE

## 2019-06-13 ENCOUNTER — OUTPATIENT (OUTPATIENT)
Dept: OUTPATIENT SERVICES | Facility: HOSPITAL | Age: 74
LOS: 1 days | Discharge: HOME | End: 2019-06-13

## 2019-06-13 DIAGNOSIS — Z98.890 OTHER SPECIFIED POSTPROCEDURAL STATES: Chronic | ICD-10-CM

## 2019-06-13 PROCEDURE — 99024 POSTOP FOLLOW-UP VISIT: CPT

## 2019-06-13 NOTE — HISTORY OF PRESENT ILLNESS
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 \par \par Right benign breast tissue with proliferative type fibrocystic changes on MR guided core biopsy 3/7/19; Central, 7 mm (cork).  \par \par \par Status post Left NLOC and Left SLNB 4/3/19 demonstrating 0/2 (-) SLNB; LUOQ NLOC two healing prior biopsy sites 20 mm with invasive well differentiated IDC and non extensive DCIS solid and cribiform types with comedo necrosis and calcifications low to intermediate nuclear grade; no LVI is seen; with negative margins.  AJCC 8th Edition Pathologic Stage: pT1c, p(sn)N0, pMx.\par \par Patient met with medical oncology; she will start anastrazole.\par Patient met with radiation oncology; patient and her daughter refuse radiation therapy.

## 2019-06-13 NOTE — REVIEW OF SYSTEMS
[Chills] : no chills [Fever] : no fever [de-identified] : 0/10 pain scale.   [Breast Pain] : no breast pain

## 2019-06-13 NOTE — PAST MEDICAL HISTORY
[FreeTextEntry5] : Unilateral oophorectomy due to fibroids 1988. [History of Hormone Replacement Treatment] : has no history of hormone replacement treatment [FreeTextEntry8] : none [FreeTextEntry6] : none [FreeTextEntry7] : OCP for 0-5 years; discontinued in 1988.

## 2019-06-13 NOTE — ASSESSMENT
[FreeTextEntry1] : 74 year old female with estrogen receptor positive, HER2 negative invasive ductal carcinoma moderately differentiated on stereotactic core biopsy status post lumpectomy and sentinel lymph node biopsy.  Her incisions are well healed and the patient is without complaints.  She met with Dr. Flores in medical oncology and will be starting therapy with anastrazole.  She met with Dr. Yanez in radiation oncology and the patient and her daughter are deferring radiation therapy.  On physical exam, her incisions in the left breast and axilla are well healed without signs of infection.  For now, she will need a bilateral diagnostic mammogram with left breast ultrasound for November 2019, with subsequent follow up clinical breast examination.  All of her and her daughters questions were appropriately answered.

## 2019-06-14 ENCOUNTER — APPOINTMENT (OUTPATIENT)
Dept: VASCULAR SURGERY | Facility: CLINIC | Age: 74
End: 2019-06-14

## 2019-06-17 DIAGNOSIS — Z87.310 PERSONAL HISTORY OF (HEALED) OSTEOPOROSIS FRACTURE: ICD-10-CM

## 2019-06-17 DIAGNOSIS — Z13.820 ENCOUNTER FOR SCREENING FOR OSTEOPOROSIS: ICD-10-CM

## 2019-06-17 DIAGNOSIS — M89.9 DISORDER OF BONE, UNSPECIFIED: ICD-10-CM

## 2019-06-17 DIAGNOSIS — Z78.0 ASYMPTOMATIC MENOPAUSAL STATE: ICD-10-CM

## 2019-06-19 ENCOUNTER — APPOINTMENT (OUTPATIENT)
Dept: CARDIOLOGY | Facility: CLINIC | Age: 74
End: 2019-06-19

## 2019-06-28 ENCOUNTER — APPOINTMENT (OUTPATIENT)
Dept: VASCULAR SURGERY | Facility: CLINIC | Age: 74
End: 2019-06-28

## 2019-07-29 ENCOUNTER — EMERGENCY (EMERGENCY)
Facility: HOSPITAL | Age: 74
LOS: 0 days | Discharge: HOME | End: 2019-07-29
Attending: EMERGENCY MEDICINE | Admitting: EMERGENCY MEDICINE
Payer: MEDICARE

## 2019-07-29 VITALS
DIASTOLIC BLOOD PRESSURE: 88 MMHG | OXYGEN SATURATION: 99 % | SYSTOLIC BLOOD PRESSURE: 136 MMHG | HEART RATE: 83 BPM | RESPIRATION RATE: 18 BRPM | TEMPERATURE: 97 F

## 2019-07-29 VITALS
OXYGEN SATURATION: 100 % | DIASTOLIC BLOOD PRESSURE: 78 MMHG | SYSTOLIC BLOOD PRESSURE: 146 MMHG | RESPIRATION RATE: 18 BRPM | TEMPERATURE: 98 F | HEART RATE: 97 BPM
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
## 2019-07-29 DIAGNOSIS — Z86.711 PERSONAL HISTORY OF PULMONARY EMBOLISM: ICD-10-CM

## 2019-07-29 DIAGNOSIS — Z98.890 OTHER SPECIFIED POSTPROCEDURAL STATES: Chronic | ICD-10-CM

## 2019-07-29 DIAGNOSIS — Z79.01 LONG TERM (CURRENT) USE OF ANTICOAGULANTS: ICD-10-CM

## 2019-07-29 DIAGNOSIS — M54.5 LOW BACK PAIN: ICD-10-CM
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
## 2019-07-29 DIAGNOSIS — N76.4 ABSCESS OF VULVA: ICD-10-CM

## 2019-07-29 DIAGNOSIS — L03.116 CELLULITIS OF LEFT LOWER LIMB: ICD-10-CM

## 2019-07-29 DIAGNOSIS — J45.909 UNSPECIFIED ASTHMA, UNCOMPLICATED: ICD-10-CM
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
## 2019-07-29 DIAGNOSIS — N93.9 ABNORMAL UTERINE AND VAGINAL BLEEDING, UNSPECIFIED: ICD-10-CM

## 2019-07-29 DIAGNOSIS — L03.115 CELLULITIS OF RIGHT LOWER LIMB: ICD-10-CM

## 2019-07-29 DIAGNOSIS — I10 ESSENTIAL (PRIMARY) HYPERTENSION: ICD-10-CM

## 2019-07-29 LAB
ALBUMIN SERPL ELPH-MCNC: 3.8 G/DL — SIGNIFICANT CHANGE UP (ref 3.5–5.2)
ALP SERPL-CCNC: 64 U/L — SIGNIFICANT CHANGE UP (ref 30–115)
ALT FLD-CCNC: 14 U/L — SIGNIFICANT CHANGE UP (ref 0–41)
ANION GAP SERPL CALC-SCNC: 14 MMOL/L — SIGNIFICANT CHANGE UP (ref 7–14)
APPEARANCE UR: CLEAR — SIGNIFICANT CHANGE UP
APTT BLD: 52.3 SEC — HIGH (ref 27–39.2)
AST SERPL-CCNC: 22 U/L — SIGNIFICANT CHANGE UP (ref 0–41)
BASOPHILS # BLD AUTO: 0.05 K/UL — SIGNIFICANT CHANGE UP (ref 0–0.2)
BASOPHILS NFR BLD AUTO: 1 % — SIGNIFICANT CHANGE UP (ref 0–1)
BILIRUB SERPL-MCNC: 0.5 MG/DL — SIGNIFICANT CHANGE UP (ref 0.2–1.2)
BILIRUB UR-MCNC: NEGATIVE — SIGNIFICANT CHANGE UP
BUN SERPL-MCNC: 7 MG/DL — LOW (ref 10–20)
CALCIUM SERPL-MCNC: 8.7 MG/DL — SIGNIFICANT CHANGE UP (ref 8.5–10.1)
CHLORIDE SERPL-SCNC: 105 MMOL/L — SIGNIFICANT CHANGE UP (ref 98–110)
CO2 SERPL-SCNC: 24 MMOL/L — SIGNIFICANT CHANGE UP (ref 17–32)
COLOR SPEC: SIGNIFICANT CHANGE UP
CREAT SERPL-MCNC: 0.7 MG/DL — SIGNIFICANT CHANGE UP (ref 0.7–1.5)
DIFF PNL FLD: NEGATIVE — SIGNIFICANT CHANGE UP
EOSINOPHIL # BLD AUTO: 0.09 K/UL — SIGNIFICANT CHANGE UP (ref 0–0.7)
EOSINOPHIL NFR BLD AUTO: 1.8 % — SIGNIFICANT CHANGE UP (ref 0–8)
GLUCOSE SERPL-MCNC: 81 MG/DL — SIGNIFICANT CHANGE UP (ref 70–99)
GLUCOSE UR QL: NEGATIVE — SIGNIFICANT CHANGE UP
HCT VFR BLD CALC: 37.4 % — SIGNIFICANT CHANGE UP (ref 37–47)
HGB BLD-MCNC: 11.8 G/DL — LOW (ref 12–16)
IMM GRANULOCYTES NFR BLD AUTO: 0.4 % — HIGH (ref 0.1–0.3)
INR BLD: SIGNIFICANT CHANGE UP RATIO (ref 0.65–1.3)
KETONES UR-MCNC: NEGATIVE — SIGNIFICANT CHANGE UP
LEUKOCYTE ESTERASE UR-ACNC: NEGATIVE — SIGNIFICANT CHANGE UP
LYMPHOCYTES # BLD AUTO: 1.27 K/UL — SIGNIFICANT CHANGE UP (ref 1.2–3.4)
LYMPHOCYTES # BLD AUTO: 24.9 % — SIGNIFICANT CHANGE UP (ref 20.5–51.1)
MCHC RBC-ENTMCNC: 29.7 PG — SIGNIFICANT CHANGE UP (ref 27–31)
MCHC RBC-ENTMCNC: 31.6 G/DL — LOW (ref 32–37)
MCV RBC AUTO: 94.2 FL — SIGNIFICANT CHANGE UP (ref 81–99)
MONOCYTES # BLD AUTO: 0.43 K/UL — SIGNIFICANT CHANGE UP (ref 0.1–0.6)
MONOCYTES NFR BLD AUTO: 8.4 % — SIGNIFICANT CHANGE UP (ref 1.7–9.3)
NEUTROPHILS # BLD AUTO: 3.24 K/UL — SIGNIFICANT CHANGE UP (ref 1.4–6.5)
NEUTROPHILS NFR BLD AUTO: 63.5 % — SIGNIFICANT CHANGE UP (ref 42.2–75.2)
NITRITE UR-MCNC: NEGATIVE — SIGNIFICANT CHANGE UP
NRBC # BLD: 0 /100 WBCS — SIGNIFICANT CHANGE UP (ref 0–0)
PH UR: 6.5 — SIGNIFICANT CHANGE UP (ref 5–8)
PLATELET # BLD AUTO: 146 K/UL — SIGNIFICANT CHANGE UP (ref 130–400)
POTASSIUM SERPL-MCNC: 4.4 MMOL/L — SIGNIFICANT CHANGE UP (ref 3.5–5)
POTASSIUM SERPL-SCNC: 4.4 MMOL/L — SIGNIFICANT CHANGE UP (ref 3.5–5)
PROT SERPL-MCNC: 6.4 G/DL — SIGNIFICANT CHANGE UP (ref 6–8)
PROT UR-MCNC: NEGATIVE — SIGNIFICANT CHANGE UP
PROTHROM AB SERPL-ACNC: SIGNIFICANT CHANGE UP SEC (ref 9.95–12.87)
RBC # BLD: 3.97 M/UL — LOW (ref 4.2–5.4)
RBC # FLD: 12.3 % — SIGNIFICANT CHANGE UP (ref 11.5–14.5)
SODIUM SERPL-SCNC: 143 MMOL/L — SIGNIFICANT CHANGE UP (ref 135–146)
SP GR SPEC: 1.01 — SIGNIFICANT CHANGE UP (ref 1.01–1.02)
UROBILINOGEN FLD QL: SIGNIFICANT CHANGE UP
WBC # BLD: 5.1 K/UL — SIGNIFICANT CHANGE UP (ref 4.8–10.8)
WBC # FLD AUTO: 5.1 K/UL — SIGNIFICANT CHANGE UP (ref 4.8–10.8)

## 2019-07-29 PROCEDURE — 99284 EMERGENCY DEPT VISIT MOD MDM: CPT | Mod: GC

## 2019-07-29 PROCEDURE — 74176 CT ABD & PELVIS W/O CONTRAST: CPT | Mod: 26

## 2019-07-29 RX ORDER — KETOROLAC TROMETHAMINE 30 MG/ML
15 SYRINGE (ML) INJECTION ONCE
Refills: 0 | Status: DISCONTINUED | OUTPATIENT
Start: 2019-07-29 | End: 2019-07-29

## 2019-07-29 RX ORDER — VANCOMYCIN HCL 1 G
1000 VIAL (EA) INTRAVENOUS ONCE
Refills: 0 | Status: DISCONTINUED | OUTPATIENT
Start: 2019-07-29 | End: 2019-07-29

## 2019-07-29 RX ADMIN — Medication 15 MILLIGRAM(S): at 12:32

## 2019-07-29 NOTE — ED PROVIDER NOTE - OBJECTIVE STATEMENT
pt is a 74 yof w/ OA, PVD, HTN, PE on eliquis here for LE erythema b/l for 1 day. pt also c/o intermittent back pain for 2 weeks since bending over and hurting her back. Pt also here for bleeding around sores by her vagina for the past couple of days. denies vaginal bleeding, rectal bleeding, cough, sob, fever, abd pain, chest pain, unilateral leg swelling

## 2019-07-29 NOTE — ED PROVIDER NOTE - PHYSICAL EXAMINATION
CONSTITUTIONAL: obese; well-nourished; in no acute distress.   SKIN: warm, dry. +erythema in lower extremities b/l with warmth  HEAD: Normocephalic; atraumatic.  EYES: PERRL, EOMI, normal sclera and conjunctiva   ENT: No nasal discharge; airway clear.  NECK: Supple; non tender.  CARD: S1, S2 normal; no murmurs, gallops, or rubs. Regular rate and rhythm.   RESP: No wheezes, rales or rhonchi.  ABD: soft ntnd  RECTAL: - AILYN  : no blood in vagina  EXT: Normal ROM.  No clubbing, cyanosis. +chronic lymphedematous changes in lower extremities b/l  LYMPH: No acute cervical adenopathy.  NEURO: Alert, oriented, grossly unremarkable  PSYCH: Cooperative, appropriate.

## 2019-07-29 NOTE — ED ADULT NURSE NOTE - OBJECTIVE STATEMENT
pt comes in with complaints of lower back pain for the past 3 days. did not take anything for pain. pt also having bleeding on the outside of vaginal area. pt went to gyn who recommended witch hazel for lumps and now have opened up. no bleeding with urination. no burning with urination.

## 2019-07-29 NOTE — ED PROVIDER NOTE - CLINICAL SUMMARY MEDICAL DECISION MAKING FREE TEXT BOX
ED work up unremarkable, pt feels better, will dc home w doxycyline for cellulitis (what ID recommended on prior admission for cellulitis), f/u pmd 1 week, strict return precautions provided.

## 2019-07-29 NOTE — ED PROVIDER NOTE - ATTENDING CONTRIBUTION TO CARE
74F PMH asthma, H TN, br ca s/p lumpectomy, PE on eliquis, PUD, obesity, p/w multiple complaints. c/o achy lower back pain x2 weeks after bending over to put on shoes. no trauma. no heavy lifting. no numbness, weakness. no fever, ivdu. no bowel/bladder incontinence/retention. no rash. no cp, sob, palp. no abd pain, nvdc. no dysuria, freq, hematuria.     pt states today when she used bathroom to urinate, when she wiped she saw blood. unclear where it came from but she has hx of multiple "boils" to vaginal area managed by her OBGYN. no brbpr or melena.    pt also states she has chronic LE Edema bilaterally, but noticed today its more red and hot than usual. no recent immobilization, hormones, hemoptysis. no trauma. no fever.     on exam, AFVSS, well giuliana nad, ncat, eomi, perrla, mmm, lctab, rrr nl s1s2 no mrg, abd soft ntnd, no cvat, no rash, aaox3, CN 2-12 intact, No nystagmus.  5/5 motor x 4 ext, SILT x 4 extremities, No facial droop or slurred speech. No pronator drift.  Normal rapid alternating movement and finger nose finger bilaterally. No midline C/T/L tenderness to palpation or step off. Normal gait, No ataxia. +bilat SI joint ttp,  bilat nonpitting edema bilat/lymphedema with overlying erythema/warmth bilat calves, soft compartments, skin intact, no necrosis/bullae, no calf ttp, FROM, 5/5 motor, silt, 2+ dp pulse,  with Dr Cooper- no vaginal bleeding on bimanual exam, brown stool on rectal exam, multiple boils/ folliculitis to bilat external labial majora and one of them on L side appears to have popped w dried blood on it,     a/p;   1- Cellulitis - will do labs, XR, cultures, start abx, doubt DVT given recent LE dopplers neg, pt on eliquis and ambulates daily  2- BP, elderly, will get CT r/o compression fx, renal colic. H&P not consistent w pyelo/uti, cord compression, dissection, shingles, epidural abscess. pain control, re-eval. NV intact.   3-  bleeding likely 2/2 boil that popped, pt on eliquis therefore bimanual/rectal exam performed and no sign of GIB/vag bleeding.

## 2019-07-29 NOTE — ED ADULT NURSE NOTE - PMH
Asthma    Breast cancer  LEFT  HTN (hypertension)    OA (osteoarthritis)    Pulmonary embolism  on eliquis  PVD (peripheral vascular disease)

## 2019-07-29 NOTE — ED PROVIDER NOTE - NS ED ROS FT
Constitutional: See HPI.  Eyes: No visual changes, eye pain or discharge. No Photophobia  ENMT: No hearing changes, pain, discharge or infections. No neck pain or stiffness. No limited ROM  Cardiac: No SOB or edema. No chest pain with exertion.  Respiratory: No cough or respiratory distress. No hemoptysis. No history of asthma or RAD.  GI: No nausea, vomiting, diarrhea or abdominal pain. - GI bleeding  : No dysuria, frequency or burning. No Discharge. +boil bleeding  MS: No myalgia, muscle weakness, joint pain. +back pain  Neuro: No headache or weakness. No LOC.  Skin: +erythema  Except as documented in the HPI, all other systems are negative.

## 2019-07-29 NOTE — ED ADULT NURSE NOTE - NSIMPLEMENTINTERV_GEN_ALL_ED
Implemented All Fall with Harm Risk Interventions:  [place] to call system. Call bell, personal items and telephone within reach. Instruct patient to call for assistance. Room bathroom lighting operational. Non-slip footwear when patient is off stretcher. Physically safe environment: no spills, clutter or unnecessary equipment. Stretcher in lowest position, wheels locked, appropriate side rails in place. Provide visual cue, wrist band, yellow gown, etc. Monitor gait and stability. Monitor for mental status changes and reorient to person, place, and time. Review medications for side effects contributing to fall risk. Reinforce activity limits and safety measures with patient and family. Provide visual clues: red socks.

## 2019-07-31 LAB
-  AMIKACIN: SIGNIFICANT CHANGE UP
-  AMPICILLIN/SULBACTAM: SIGNIFICANT CHANGE UP
-  AMPICILLIN: SIGNIFICANT CHANGE UP
-  AZTREONAM: SIGNIFICANT CHANGE UP
-  CEFAZOLIN: SIGNIFICANT CHANGE UP
-  CEFEPIME: SIGNIFICANT CHANGE UP
-  CEFOXITIN: SIGNIFICANT CHANGE UP
-  CEFTRIAXONE: SIGNIFICANT CHANGE UP
-  CIPROFLOXACIN: SIGNIFICANT CHANGE UP
-  ERTAPENEM: SIGNIFICANT CHANGE UP
-  GENTAMICIN: SIGNIFICANT CHANGE UP
-  LEVOFLOXACIN: SIGNIFICANT CHANGE UP
-  MEROPENEM: SIGNIFICANT CHANGE UP
-  NITROFURANTOIN: SIGNIFICANT CHANGE UP
-  PIPERACILLIN/TAZOBACTAM: SIGNIFICANT CHANGE UP
-  TOBRAMYCIN: SIGNIFICANT CHANGE UP
-  TRIMETHOPRIM/SULFAMETHOXAZOLE: SIGNIFICANT CHANGE UP
CULTURE RESULTS: SIGNIFICANT CHANGE UP
METHOD TYPE: SIGNIFICANT CHANGE UP
ORGANISM # SPEC MICROSCOPIC CNT: SIGNIFICANT CHANGE UP
ORGANISM # SPEC MICROSCOPIC CNT: SIGNIFICANT CHANGE UP
SPECIMEN SOURCE: SIGNIFICANT CHANGE UP

## 2019-08-01 RX ORDER — AMOXICILLIN 250 MG/5ML
1 SUSPENSION, RECONSTITUTED, ORAL (ML) ORAL
Qty: 14 | Refills: 0
Start: 2019-08-01 | End: 2019-08-07

## 2019-08-03 LAB
CULTURE RESULTS: SIGNIFICANT CHANGE UP
CULTURE RESULTS: SIGNIFICANT CHANGE UP
SPECIMEN SOURCE: SIGNIFICANT CHANGE UP
SPECIMEN SOURCE: SIGNIFICANT CHANGE UP

## 2019-08-16 NOTE — DISCHARGE NOTE PROVIDER - PROVIDER TOKENS
no fever and no chills. PROVIDER:[TOKEN:[[id number]]] PROVIDER:[TOKEN:[[id number]]],PROVIDER:[TOKEN:[[id number]]],PROVIDER:[TOKEN:[[id number]]]

## 2019-08-29 ENCOUNTER — OUTPATIENT (OUTPATIENT)
Dept: OUTPATIENT SERVICES | Facility: HOSPITAL | Age: 74
LOS: 1 days | Discharge: HOME | End: 2019-08-29

## 2019-08-29 ENCOUNTER — APPOINTMENT (OUTPATIENT)
Dept: HEMATOLOGY ONCOLOGY | Facility: CLINIC | Age: 74
End: 2019-08-29
Payer: MEDICARE

## 2019-08-29 VITALS
SYSTOLIC BLOOD PRESSURE: 150 MMHG | HEART RATE: 95 BPM | TEMPERATURE: 96.2 F | HEIGHT: 66 IN | BODY MASS INDEX: 47.09 KG/M2 | RESPIRATION RATE: 18 BRPM | WEIGHT: 293 LBS | DIASTOLIC BLOOD PRESSURE: 82 MMHG

## 2019-08-29 DIAGNOSIS — Z98.890 OTHER SPECIFIED POSTPROCEDURAL STATES: Chronic | ICD-10-CM

## 2019-08-29 DIAGNOSIS — C50.412 MALIGNANT NEOPLASM OF UPPER-OUTER QUADRANT OF LEFT FEMALE BREAST: ICD-10-CM

## 2019-08-29 PROCEDURE — 99214 OFFICE O/P EST MOD 30 MIN: CPT

## 2019-08-29 RX ORDER — CEPHALEXIN 500 MG/1
500 CAPSULE ORAL 3 TIMES DAILY
Refills: 0 | Status: COMPLETED | COMMUNITY
End: 2019-08-29

## 2019-08-29 RX ORDER — TOBRAMYCIN AND DEXAMETHASONE 3; 1 MG/ML; MG/ML
0.3-0.1 SUSPENSION/ DROPS OPHTHALMIC
Qty: 5 | Refills: 0 | Status: COMPLETED | COMMUNITY
Start: 2017-03-21 | End: 2019-08-29

## 2019-08-29 RX ORDER — ESCITALOPRAM OXALATE 10 MG/1
10 TABLET, FILM COATED ORAL
Refills: 0 | Status: COMPLETED | COMMUNITY
End: 2019-08-29

## 2019-09-01 NOTE — END OF VISIT
[FreeTextEntry3] : I was physically present for the key portions of the evaluation and management service provided.  I agree with the history and physical, and plan which I have reviewed and edited where appropriate.\par

## 2019-09-01 NOTE — REASON FOR VISIT
[Initial Consultation] : an initial consultation [Follow-Up Visit] : a follow-up [FreeTextEntry2] : left breast cancer.

## 2019-09-01 NOTE — HISTORY OF PRESENT ILLNESS
[de-identified] : 8/29/19\par Patient is here today for follow up visit accompanied by her daughter, Brie. She started Anastrozole 6/2019.  Initially she experienced fatigue but now tolerating it a little better. She takes Oxycodone prn for low back pain. Her daughter will take her to pain management, Dr. Harley. She wants to continue Anastrozole for now but may need to switch AI if pain worsens.\par She saw quinten Morales, today for follow up of PE on Eliquis.  Repeat VQ scan and CXR ordered.  F/U appt 10/16/19.\par She had bone density on 6/13/19 which showed osteopenia in femoral neck.  She takes calcium and Vit D.\par   [de-identified] : 73 yo female is referred by Dr. Stewart for consultation of systemic adjuvant therapy for newly diagnosed left sided breast cancer. The patient had a screening mammogram on 12/13/2018 where segmental calcifications were identified in the left breast at 12:00 position middle depth. She was subsequently recalled for a diagnostic mammogram, performed on 1/22/2019. Spot magnification views demonstrated a 6 cm area of segmental amorphous calcifications in the left breast at 12:00 position middle depth, for which a stereotactic biopsy was recommended. \par \par A stereotactic biopsy performed on 1/22/2019 demonstrated invasive moderately differentiated ductal carcinoma and sclerosing intraductal papilloma at the most anterior aspect of the biopsied calcifications and fibroadenomatoid change at the most posterior aspect of the biopsied calcifications. \par Estrogen receptor positive %\par Progesterone receptor positive %\par HER2 negative Negative 0\par Ki-67: 3%\par \par On 2/23/19, b/l breast MRI showed:\par RIGHT BREAST: \par Slightly irregular, linear nonmass enhancement in the central aspect of the right breast measures approximately 0.7 cm. MRI guided biopsy is recommended. No additional suspicious abnormal enhancement is seen in the right breast. No right axillary adenopathy. \par LEFT BREAST: \par 2 biopsy clips are seen in the superior aspect of the left breast. Mild patchy nonmass enhancement is seen surrounding the biopsy clips; linear nonmass enhancement is seen along the anterior and lateral aspect of the superior biopsy clip measuring approximately 1.8 cm. These correspond to the calcifications seen on the mammogram and to the biopsy-proven carcinoma. \par \par On 3/7/19, MRI guided core biopsy of right nonmass enhancement showed proliferative type fibrocystic changes. \par \par On 4/3/19, she underwent Left UOQ NLOC and Left SLNB. The pathology demonstrated 20 mm invasive well differentiated IDC and non extensive DCIS solid and cribriform types with comedo necrosis and calcifications low to intermediate nuclear grade; no LVI is seen; with negative margins. 0/2 (-) SLNB; AJCC 8th Edition Pathologic Stage: pT1c, p(sn)N0, pMx. \par \par The surgical specimen was sent for Oncotype dx analysis. her RS is 6, in the low risk range and predicting 3% risk of distant recurrence with endocrine therapy.\par \par The patient recovered well from surgery. She is here with her daughter to discuss systemic adjuvant therapy. \par \par Her PMH is significant for subsegmental PE found in 4/2019. She has been taking Eliquis. She has b/l leg venous insufficiency. \par \par She has no family history of breast cancer and ovarian cancer. \par

## 2019-09-01 NOTE — ASSESSMENT
[FreeTextEntry1] : 75 yo female has stage IA ([id number]) IDC of the left breast, G1, ER/IN positive, Her-2 negative, s/p left breast lumpectomy and SLNB.\par RS 6.\par \par Recommendation:\par -- Continue Anastrozole daily, calcium and vitamin D supplement. The sided effects of AI reviewed again. She wants to stay on Anastrozole for now and consider switch if muscular skeletal symptoms get worse. \par -- She had provoked segmental PE in 4/2019 and has been on Eliquis. She should continue anticoagulation for at least 6 months. Followup with Dr. Hickey.\par -- She will have surveillance mammogram of left breast every 6 months and right side annually.\par -- For low back pain, will order MRI of LS spine without contrast given.  Financial aware for auth if needed.\par -- RTO for followup in 3 months.\par \par Case was seen and discussed with Dr. Flores  who agreed with the assessment and plan.\par \par \par \par \par

## 2019-09-01 NOTE — CONSULT LETTER
[Dear  ___] : Dear  [unfilled], [Please see my note below.] : Please see my note below. [Sincerely,] : Sincerely, [DrBrittany  ___] : Dr. LOGAN [Courtesy Letter:] : I had the pleasure of seeing your patient, [unfilled], in my office today. [FreeTextEntry3] : Herman Flores MD

## 2019-09-01 NOTE — PHYSICAL EXAM
[Restricted in physically strenuous activity but ambulatory and able to carry out work of a light or sedentary nature] : Status 1- Restricted in physically strenuous activity but ambulatory and able to carry out work of a light or sedentary nature, e.g., light house work, office work [Normal] : affect appropriate [de-identified] : Status post left breast lumpectomy and SLNB. The surgical scars are healing well.

## 2019-09-06 DIAGNOSIS — Z79.811 LONG TERM (CURRENT) USE OF AROMATASE INHIBITORS: ICD-10-CM

## 2019-09-08 ENCOUNTER — FORM ENCOUNTER (OUTPATIENT)
Age: 74
End: 2019-09-08

## 2019-09-09 ENCOUNTER — OUTPATIENT (OUTPATIENT)
Dept: OUTPATIENT SERVICES | Facility: HOSPITAL | Age: 74
LOS: 1 days | Discharge: HOME | End: 2019-09-09
Payer: MEDICARE

## 2019-09-09 DIAGNOSIS — Z98.890 OTHER SPECIFIED POSTPROCEDURAL STATES: Chronic | ICD-10-CM

## 2019-09-09 DIAGNOSIS — I26.99 OTHER PULMONARY EMBOLISM WITHOUT ACUTE COR PULMONALE: ICD-10-CM

## 2019-09-09 DIAGNOSIS — C50.412 MALIGNANT NEOPLASM OF UPPER-OUTER QUADRANT OF LEFT FEMALE BREAST: ICD-10-CM

## 2019-09-09 PROCEDURE — 78582 LUNG VENTILAT&PERFUS IMAGING: CPT | Mod: 26

## 2019-09-09 PROCEDURE — 72148 MRI LUMBAR SPINE W/O DYE: CPT | Mod: 26

## 2019-09-09 PROCEDURE — 71046 X-RAY EXAM CHEST 2 VIEWS: CPT | Mod: 26

## 2019-09-11 DIAGNOSIS — C50.412 MALIGNANT NEOPLASM OF UPPER-OUTER QUADRANT OF LEFT FEMALE BREAST: ICD-10-CM

## 2019-09-11 DIAGNOSIS — Z02.9 ENCOUNTER FOR ADMINISTRATIVE EXAMINATIONS, UNSPECIFIED: ICD-10-CM

## 2019-09-24 ENCOUNTER — LABORATORY RESULT (OUTPATIENT)
Age: 74
End: 2019-09-24

## 2019-09-24 ENCOUNTER — APPOINTMENT (OUTPATIENT)
Dept: PEDIATRIC ALLERGY IMMUNOLOGY | Facility: CLINIC | Age: 74
End: 2019-09-24
Payer: MEDICARE

## 2019-09-24 VITALS
WEIGHT: 293 LBS | OXYGEN SATURATION: 95 % | HEART RATE: 96 BPM | SYSTOLIC BLOOD PRESSURE: 168 MMHG | DIASTOLIC BLOOD PRESSURE: 100 MMHG | BODY MASS INDEX: 47.09 KG/M2 | HEIGHT: 65.98 IN

## 2019-09-24 VITALS — DIASTOLIC BLOOD PRESSURE: 82 MMHG | SYSTOLIC BLOOD PRESSURE: 138 MMHG | HEART RATE: 52 BPM

## 2019-09-24 PROCEDURE — 99204 OFFICE O/P NEW MOD 45 MIN: CPT

## 2019-09-24 PROCEDURE — 36415 COLL VENOUS BLD VENIPUNCTURE: CPT

## 2019-09-25 RX ORDER — BUDESONIDE AND FORMOTEROL FUMARATE DIHYDRATE 80; 4.5 UG/1; UG/1
80-4.5 AEROSOL RESPIRATORY (INHALATION)
Refills: 0 | Status: COMPLETED | COMMUNITY
End: 2019-09-25

## 2019-09-25 NOTE — REASON FOR VISIT
[Initial Consultation] : an initial consultation for [Allergy Evaluation/ Skin Testing] : allergy evaluation and or skin testing [Other: _____] : [unfilled]

## 2019-09-25 NOTE — PHYSICAL EXAM
[Alert] : alert [Well Nourished] : well nourished [Healthy Appearance] : healthy appearance [No Acute Distress] : no acute distress [No Discharge] : no discharge [No Photophobia] : no photophobia [Sclera Not Icteric] : sclera not icteric [Normal Nasal Mucosa] : the nasal mucosa was normal [Normal TMs] : both tympanic membranes were normal [Normal Outer Ear/Nose] : the ears and nose were normal in appearance [Normal Lips/Tongue] : the lips and tongue were normal [No Thrush] : no thrush [No Oral Lesions or Ulcers] : no oral lesions or ulcers [Supple] : the neck was supple [Normal Rate and Effort] : normal respiratory rhythm and effort [No Crackles] : no crackles [Bilateral Audible Breath Sounds] : bilateral audible breath sounds [Normal S1, S2] : normal S1 and S2 [Normal Rate] : heart rate was normal  [Regular Rhythm] : with a regular rhythm [Soft] : abdomen soft [Not Tender] : non-tender [No HSM] : no hepato-splenomegaly [Normal Cervical Lymph Nodes] : cervical [Skin Intact] : skin intact  [No Rash] : no rash [No Joint Swelling or Erythema] : no joint swelling or erythema [No clubbing] : no clubbing [No Edema] : no edema [No Cyanosis] : no cyanosis [Normal Affect] : affect was normal [Normal Mood] : mood was normal [Alert, Awake, Oriented as Age-Appropriate] : alert, awake, oriented as age appropriate [Conjunctival Erythema] : no conjunctival erythema [Pharyngeal erythema] : no pharyngeal erythema [Exudate] : no exudate [Posterior Pharyngeal Cobblestoning] : no posterior pharyngeal cobblestoning [Wheezing] : no wheezing was heard

## 2019-09-25 NOTE — CONSULT LETTER
[Dear  ___] : Dear  [unfilled], [Please see my note below.] : Please see my note below. [Consult Letter:] : I had the pleasure of evaluating your patient, [unfilled]. [Sincerely,] : Sincerely, [Consult Closing:] : Thank you very much for allowing me to participate in the care of this patient.  If you have any questions, please do not hesitate to contact me. [DrBrittany  ___] : Dr. LOGAN [FreeTextEntry3] : Nuha Adams MD FAAMICHAELI, LAINE\par Adult and Pediatric Allergy, Asthma and Clinical Immunology\par  of Medicine and Pediatrics at\par   [facility]\par Section Head, Adult Allergy and Immunology\par   [facility]\par   Division of Allergy, Asthma and Immunology\par   [address], [place] [address]\par   [place], [place] [address]\par   Phone [phone]  Fax [phone]\par \par \par

## 2019-09-25 NOTE — HISTORY OF PRESENT ILLNESS
[Asthma] : asthma [Allergic Rhinitis] : allergic rhinitis [Eczematous rashes] : eczematous rashes [Venom Reactions] : venom reactions [Food Allergies] : food allergies [de-identified] : EL STEIN is a 74 year  old female with breast cancer Stage A1, diagnosed in 2019,  presents for evaluation of facial swelling and stridor that occurred 4/3/2019 after the surgery/left breast lumpectomy at [facility]. \par \par Hospital and anesthesia record, including hand-written flow sheet reviewed. It appears that after anesthesia and after she was extubated post-surgery, patient developed itching and facial swelling. \par Nurses note at 11:38 am - patient scratching left side of the face, facial swelling and ?stridor. She was treated, re-intubation was contemplated but was not needed. Photocopied hand-written anesthesia note is poorly readable. If I read it correctly Propofol, Rocuronium ( Zemuron) and Sublimax ( fentanyl) were used, but this is not a complete list. \par \par She had thyroidectomy 2017 and had no problems, oophorectomy - more than 30 years ago. She has no history of swelling or allergic reactions to medications or foods. \par \par No history of reaction to latex. There is history of allergic reactions before.

## 2019-09-25 NOTE — REVIEW OF SYSTEMS
[Fatigue] : fatigue [Nl] : Integumentary [Fever] : no fever [Easy Bleeding] : no ~M tendency for easy bleeding [FreeTextEntry9] : low-back pain

## 2019-09-26 LAB
DEPRECATED LTX IGE RAST QL: 0
LTX IGE QN: <0.1 KUA/L
TRYPTASE: 3.5 NG/ML

## 2019-10-11 ENCOUNTER — APPOINTMENT (OUTPATIENT)
Dept: CARDIOLOGY | Facility: CLINIC | Age: 74
End: 2019-10-11
Payer: MEDICARE

## 2019-10-11 VITALS — DIASTOLIC BLOOD PRESSURE: 80 MMHG | HEART RATE: 88 BPM | SYSTOLIC BLOOD PRESSURE: 130 MMHG | RESPIRATION RATE: 18 BRPM

## 2019-10-11 VITALS — WEIGHT: 293 LBS | BODY MASS INDEX: 48.82 KG/M2 | HEIGHT: 65 IN

## 2019-10-11 PROCEDURE — 99214 OFFICE O/P EST MOD 30 MIN: CPT

## 2019-10-11 PROCEDURE — 93000 ELECTROCARDIOGRAM COMPLETE: CPT

## 2019-10-11 NOTE — PHYSICAL EXAM
[Normal Appearance] : normal appearance [General Appearance - Well Developed] : well developed [Well Groomed] : well groomed [General Appearance - Well Nourished] : well nourished [No Deformities] : no deformities [Normal Conjunctiva] : the conjunctiva exhibited no abnormalities [General Appearance - In No Acute Distress] : no acute distress [Eyelids - No Xanthelasma] : the eyelids demonstrated no xanthelasmas [Normal Oral Mucosa] : normal oral mucosa [No Oral Pallor] : no oral pallor [No Oral Cyanosis] : no oral cyanosis [Normal Jugular Venous A Waves Present] : normal jugular venous A waves present [No Jugular Venous Schmitt A Waves] : no jugular venous schmitt A waves [Normal Jugular Venous V Waves Present] : normal jugular venous V waves present [Heart Rate And Rhythm] : heart rate and rhythm were normal [Heart Sounds] : normal S1 and S2 [Murmurs] : no murmurs present [Respiration, Rhythm And Depth] : normal respiratory rhythm and effort [Exaggerated Use Of Accessory Muscles For Inspiration] : no accessory muscle use [Auscultation Breath Sounds / Voice Sounds] : lungs were clear to auscultation bilaterally [Bowel Sounds] : normal bowel sounds [Abdomen Soft] : soft [Abdomen Tenderness] : non-tender [] : no hepato-splenomegaly [Abdomen Mass (___ Cm)] : no abdominal mass palpated [Abnormal Walk] : normal gait [Oriented To Time, Place, And Person] : oriented to person, place, and time [FreeTextEntry1] : venous stasis

## 2019-10-21 NOTE — ED PROVIDER NOTE - CROS ED PSYCH ALL NEG
"Anesthesia Transfer of Care Note    Patient: Spencer Levi    Procedure(s) Performed: Procedure(s) (LRB):  COLONOSCOPY (N/A)    Patient location: GI    Anesthesia Type: MAC    Post pain: adequate analgesia    Post assessment: no apparent anesthetic complications    Post vital signs: stable    Level of consciousness: awake and alert    Nausea/Vomiting: no nausea/vomiting    Complications: none    Transfer of care protocol was followed      Last vitals:   Visit Vitals  BP (!) 140/93 (BP Location: Left arm, Patient Position: Lying)   Pulse 70   Temp 36.6 °C (97.9 °F) (Oral)   Resp 16   Ht 5' 9" (1.753 m)   Wt 72.6 kg (160 lb)   SpO2 100%   Breastfeeding? No   BMI 23.63 kg/m²     "
negative...

## 2019-11-06 ENCOUNTER — FORM ENCOUNTER (OUTPATIENT)
Age: 74
End: 2019-11-06

## 2019-11-07 ENCOUNTER — OUTPATIENT (OUTPATIENT)
Dept: OUTPATIENT SERVICES | Facility: HOSPITAL | Age: 74
LOS: 1 days | Discharge: HOME | End: 2019-11-07
Payer: MEDICARE

## 2019-11-07 DIAGNOSIS — Z98.890 OTHER SPECIFIED POSTPROCEDURAL STATES: Chronic | ICD-10-CM

## 2019-11-07 DIAGNOSIS — R92.8 OTHER ABNORMAL AND INCONCLUSIVE FINDINGS ON DIAGNOSTIC IMAGING OF BREAST: ICD-10-CM

## 2019-11-07 PROCEDURE — 76642 ULTRASOUND BREAST LIMITED: CPT | Mod: 26,LT

## 2019-11-07 PROCEDURE — 77066 DX MAMMO INCL CAD BI: CPT | Mod: 26

## 2019-11-07 PROCEDURE — G0279: CPT | Mod: 26

## 2019-11-14 ENCOUNTER — OUTPATIENT (OUTPATIENT)
Dept: OUTPATIENT SERVICES | Facility: HOSPITAL | Age: 74
LOS: 1 days | Discharge: HOME | End: 2019-11-14

## 2019-11-14 ENCOUNTER — APPOINTMENT (OUTPATIENT)
Dept: HEMATOLOGY ONCOLOGY | Facility: CLINIC | Age: 74
End: 2019-11-14
Payer: MEDICARE

## 2019-11-14 ENCOUNTER — APPOINTMENT (OUTPATIENT)
Dept: BREAST CENTER | Facility: CLINIC | Age: 74
End: 2019-11-14

## 2019-11-14 VITALS
SYSTOLIC BLOOD PRESSURE: 150 MMHG | BODY MASS INDEX: 48.65 KG/M2 | HEIGHT: 65 IN | TEMPERATURE: 97.6 F | DIASTOLIC BLOOD PRESSURE: 80 MMHG | HEART RATE: 97 BPM | WEIGHT: 292 LBS

## 2019-11-14 DIAGNOSIS — Z98.890 OTHER SPECIFIED POSTPROCEDURAL STATES: Chronic | ICD-10-CM

## 2019-11-14 PROCEDURE — 99214 OFFICE O/P EST MOD 30 MIN: CPT

## 2019-11-19 ENCOUNTER — APPOINTMENT (OUTPATIENT)
Dept: BREAST CENTER | Facility: CLINIC | Age: 74
End: 2019-11-19
Payer: MEDICARE

## 2019-11-19 VITALS
DIASTOLIC BLOOD PRESSURE: 82 MMHG | TEMPERATURE: 98.1 F | HEIGHT: 65 IN | SYSTOLIC BLOOD PRESSURE: 148 MMHG | WEIGHT: 292 LBS | BODY MASS INDEX: 48.65 KG/M2

## 2019-11-19 PROCEDURE — 99212 OFFICE O/P EST SF 10 MIN: CPT

## 2019-11-19 NOTE — HISTORY OF PRESENT ILLNESS
[de-identified] : 75 yo female is referred by Dr. Stewart for consultation of systemic adjuvant therapy for newly diagnosed left sided breast cancer. The patient had a screening mammogram on 12/13/2018 where segmental calcifications were identified in the left breast at 12:00 position middle depth. She was subsequently recalled for a diagnostic mammogram, performed on 1/22/2019. Spot magnification views demonstrated a 6 cm area of segmental amorphous calcifications in the left breast at 12:00 position middle depth, for which a stereotactic biopsy was recommended. \par \par A stereotactic biopsy performed on 1/22/2019 demonstrated invasive moderately differentiated ductal carcinoma and sclerosing intraductal papilloma at the most anterior aspect of the biopsied calcifications and fibroadenomatoid change at the most posterior aspect of the biopsied calcifications. \par Estrogen receptor positive %\par Progesterone receptor positive %\par HER2 negative Negative 0\par Ki-67: 3%\par \par On 2/23/19, b/l breast MRI showed:\par RIGHT BREAST: \par Slightly irregular, linear nonmass enhancement in the central aspect of the right breast measures approximately 0.7 cm. MRI guided biopsy is recommended. No additional suspicious abnormal enhancement is seen in the right breast. No right axillary adenopathy. \par LEFT BREAST: \par 2 biopsy clips are seen in the superior aspect of the left breast. Mild patchy nonmass enhancement is seen surrounding the biopsy clips; linear nonmass enhancement is seen along the anterior and lateral aspect of the superior biopsy clip measuring approximately 1.8 cm. These correspond to the calcifications seen on the mammogram and to the biopsy-proven carcinoma. \par \par On 3/7/19, MRI guided core biopsy of right nonmass enhancement showed proliferative type fibrocystic changes. \par \par On 4/3/19, she underwent Left UOQ NLOC and Left SLNB. The pathology demonstrated 20 mm invasive well differentiated IDC and non extensive DCIS solid and cribriform types with comedo necrosis and calcifications low to intermediate nuclear grade; no LVI is seen; with negative margins. 0/2 (-) SLNB; AJCC 8th Edition Pathologic Stage: pT1c, p(sn)N0, pMx. \par \par The surgical specimen was sent for Oncotype dx analysis. her RS is 6, in the low risk range and predicting 3% risk of distant recurrence with endocrine therapy.\par \par The patient recovered well from surgery. She is here with her daughter to discuss systemic adjuvant therapy. \par \par Her PMH is significant for subsegmental PE found in 4/2019. She has been taking Eliquis. She has b/l leg venous insufficiency. \par \par She has no family history of breast cancer and ovarian cancer. \par  [de-identified] : 8/29/19\par Patient is here today for follow up visit accompanied by her daughter, Brie. She started Anastrozole 6/2019.  Initially she experienced fatigue but now tolerating it a little better. She takes Oxycodone prn for low back pain. Her daughter will take her to pain management, Dr. Harley. She wants to continue Anastrozole for now but may need to switch AI if pain worsens.\par She saw nash Morales, today for follow up of PE on Eliquis.  Repeat VQ scan and CXR ordered.  F/U appt 10/16/19.\par She had bone density on 6/13/19 which showed osteopenia in femoral neck.  She takes calcium and Vit D.\par \par 11/14/19:\par Patient is here today for follow up visit accompanied by her daughter. She has been taking Anastrozole daily since 6/2019. She has chronic joint aching and back pain. She takes Oxycodone prn for low back pain. In 9/2019, she had MRI lumbar spine which showed degenerative change, disc bulging and spinal stenosis. She sees Dr. Harley for pain management. She is taking Eliquis for h/o PE.\par She does not have breast related symptoms. She had b/l dx mammo in 11/2019. There is no suspicious finding. Bone density in 6/2019 showed mild osteopenia in femoral neck.  \par

## 2019-11-19 NOTE — ASSESSMENT
[FreeTextEntry1] : 73 yo female has stage IA ([id number]) IDC of the left breast, G1, ER/IN positive, Her-2 negative, s/p left breast lumpectomy and SLNB.\par RS 6.\par \par Recommendation:\par -- Continue Anastrozole daily, calcium and vitamin D supplement. The sided effects of AI reviewed again. She wants to stay on Anastrozole for now and consider switch if muscular skeletal symptoms get worse. \par -- She had provoked segmental PE in 4/2019 and has been on Eliquis.  Followup with Dr. Hickey.\par -- She will have surveillance mammogram of left breast every 6 months and right side annually.\par -- Followup with PCP for other medical problems.\par -- RTO for followup in 6 months.\par \par Case was seen and discussed with Dr. Flores  who agreed with the assessment and plan.\par \par \par \par \par

## 2019-11-19 NOTE — DATA REVIEWED
[FreeTextEntry1] : EXAM: US BREAST LIMITED LT \par EXAM: MG MAMMO DIAG W TORSTEN BI# \par PROCEDURE DATE: 11/07/2019 \par INTERPRETATION: CLINICAL HISTORY / REASON FOR EXAM: Left lumpectomy follow-up, surgery in April 2019. \par The patient reports her last clinical breast examination was performed 3 months ago. \par Comparison is made to the prior examinations dated back to December 2018. \par FINDINGS: \par Mammogram2-D/tomosynthesis full field ML as well as spot views in the CC and MLO projections of the left breast. Computer-aided \par detection was utilized in the interpretation of this examination. \par Breast composition: There are scattered areas of fibroglandular density. \par The lumpectomy site in the upper outer quadrant of the left breast demonstrates benign postoperative architectural \par distortion as well as a hyperdense collection measuring approximately 6.0 x 4.8 cm, most consistent with a seroma. Stable \par oval mass within the left lateral breast. No new microcalcifications. \par Left Breast SonogramTargeted breast ultrasound was performed. \par Within the lumpectomy site at the 12:00 position, 7 cm from the nipple, there is an oval circumscribed anechoic mass \par measuring up to 6.1 cm in maximum diameter, containing floating debris and thin septations, corresponding to the \par mammographically seen mass and consistent with a postoperative seroma. \par IMPRESSION: \par Mammographically and sonographically apparent mass in the left breast at 12:00 lumpectomy site, consistent with a \par postoperative seroma. \par RECOMMENDATION: As per post lumpectomy routine, a follow-up unilateral left mammogram are recommended.\par BI-RADS Category 2: Benign \par MARIVN ROSE M.D., RESIDENT RADIOLOGIST \par This document has been electronically signed. \par URIEL GUILLAUME M.D., ATTENDING RADIOLOGIST \par This document has been electronically signed. Nov 7 2019 4:26PM

## 2019-11-19 NOTE — HISTORY OF PRESENT ILLNESS
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 \par \par Right benign breast tissue with proliferative type fibrocystic changes on MR guided core biopsy 3/7/19; Central, 7 mm (cork).  \par \par \par Status post Left NLOC and Left SLNB 4/3/19 demonstrating 0/2 (-) SLNB; LUOQ NLOC two healing prior biopsy sites 20 mm with invasive well differentiated IDC and non extensive DCIS solid and cribiform types with comedo necrosis and calcifications low to intermediate nuclear grade; no LVI is seen; with negative margins.  AJCC 8th Edition Pathologic Stage: pT1c, p(sn)N0, pMx.\par \par Patient met with medical oncology; she will start anastrazole.\par Patient met with radiation oncology; patient and her daughter refuse radiation therapy.  
n/a

## 2019-11-19 NOTE — CONSULT LETTER
[Dear  ___] : Dear  [unfilled], [Courtesy Letter:] : I had the pleasure of seeing your patient, [unfilled], in my office today. [Please see my note below.] : Please see my note below. [Sincerely,] : Sincerely, [DrBrittany  ___] : Dr. LOGAN [FreeTextEntry3] : Herman Flores MD

## 2019-11-19 NOTE — PHYSICAL EXAM
[No Supraclavicular Adenopathy] : no supraclavicular adenopathy [Examined in the supine and seated position] : examined in the supine and seated position [Symmetrical] : symmetrical [No dominant masses] : no dominant masses in right breast  [No dominant masses] : no dominant masses left breast [No Nipple Retraction] : no left nipple retraction [No Nipple Discharge] : no left nipple discharge [Breast Mass Right Breast ___cm] : no masses [Breast Mass Left Breast ___cm] : no masses [Breast Nipple Inversion] : nipples not inverted [Breast Nipple Retraction] : nipples not retracted [Breast Nipple Flattening] : nipples not flattened [Breast Nipple Fissures] : nipples not fissured [Breast Abnormal Lactation (Galactorrhea)] : no galactorrhea [Breast Abnormal Secretion Bloody Fluid] : no bloody discharge [Breast Abnormal Secretion Serous Fluid] : no serous discharge [Breast Abnormal Secretion Opalescent Fluid] : no milky discharge [No Axillary Lymphadenopathy] : no left axillary lymphadenopathy [No Edema] : no edema [No Swelling] : no swelling [No Rashes] : no rashes [Full ROM] : full range of motion [No Ulceration] : no ulceration

## 2019-11-19 NOTE — REASON FOR VISIT
[Follow-Up Visit] : a follow-up [Initial Consultation] : an initial consultation [FreeTextEntry2] : left breast cancer.

## 2019-11-19 NOTE — ASSESSMENT
[FreeTextEntry1] : 74 year old female with history of left breast estrogen receptor positive, HER2 negative invasive moderately differentiated ductal carcinoma on stereotactic core biopsy, status post lumpectomy and SLNB 4/3/19.  Patient and her family refused radiation therapy.  She was not a candidate for chemotherapy.  She is currently on anastrazole.  She also has a history of left breast fibrocystic changes on core biopsy 1/22/19 and right breast fibrocystic changes on MR core biopsy 3/7/19.  She has no family history of breast or ovarian cancer.   \par \par Bilateral diagnostic mammogram and ultrasound for short term follow up were performed in November. This demonstrated scattered areas of fibroglandular densities.  The lumpectomy site in the upper outer quadrant of the left breast demonstrates benign post operative architectural distortion as well as hyper dense collection measuring approximately 60 mm consistent with a seroma.  Stable oval mass within the left lateral breast.  No new microcalcifications.  Targeted left breast ultrasound was performed and demonstrated post operative seroma.\par \par She is here for evaluation of these findings.  At this time, these findings do not present the need for surgical intervention.  She has a benign clinical breast examination and no current complaints related to the breasts. For now, she will need a left breast diagnostic mammogram for May 2020, with subsequent follow up clinical breast examination.  I spent a total of 10 minutes of face to face time with this patient, greater than 50% of which was spent in counseling and/or coordination of care.  All of her questions were appropriately answered.\par

## 2019-11-19 NOTE — PAST MEDICAL HISTORY
[Postmenopausal] : The patient is postmenopausal [Menopause Age____] : age at menopause was [unfilled] [Menarche Age ____] : age at menarche was [unfilled] [Total Preg ___] : G[unfilled] [Age At Live Birth ___] : Age at live birth: [unfilled] [Live Births ___] : P[unfilled]  [History of Hormone Replacement Treatment] : has no history of hormone replacement treatment [FreeTextEntry5] : Unilateral oophorectomy due to fibroids 1988. [FreeTextEntry6] : none [FreeTextEntry7] : OCP for 0-5 years; discontinued in 1988. [FreeTextEntry8] : none

## 2019-11-19 NOTE — PHYSICAL EXAM
[Restricted in physically strenuous activity but ambulatory and able to carry out work of a light or sedentary nature] : Status 1- Restricted in physically strenuous activity but ambulatory and able to carry out work of a light or sedentary nature, e.g., light house work, office work [Normal] : affect appropriate [de-identified] : Status post left breast lumpectomy and SLNB. The surgical scars are healing well.

## 2019-11-20 DIAGNOSIS — C50.412 MALIGNANT NEOPLASM OF UPPER-OUTER QUADRANT OF LEFT FEMALE BREAST: ICD-10-CM

## 2019-11-20 DIAGNOSIS — Z79.811 LONG TERM (CURRENT) USE OF AROMATASE INHIBITORS: ICD-10-CM

## 2019-11-22 ENCOUNTER — APPOINTMENT (OUTPATIENT)
Dept: VASCULAR SURGERY | Facility: CLINIC | Age: 74
End: 2019-11-22
Payer: MEDICARE

## 2019-11-22 ENCOUNTER — APPOINTMENT (OUTPATIENT)
Dept: VASCULAR SURGERY | Facility: CLINIC | Age: 74
End: 2019-11-22

## 2019-11-22 VITALS
BODY MASS INDEX: 48.65 KG/M2 | DIASTOLIC BLOOD PRESSURE: 85 MMHG | SYSTOLIC BLOOD PRESSURE: 140 MMHG | WEIGHT: 292 LBS | HEIGHT: 65 IN

## 2019-11-22 DIAGNOSIS — M19.90 UNSPECIFIED OSTEOARTHRITIS, UNSPECIFIED SITE: ICD-10-CM

## 2019-11-22 PROCEDURE — 99214 OFFICE O/P EST MOD 30 MIN: CPT

## 2019-11-22 RX ORDER — LEVOTHYROXINE SODIUM 150 UG/1
150 TABLET ORAL DAILY
Refills: 0 | Status: COMPLETED | COMMUNITY
End: 2019-11-22

## 2019-11-22 RX ORDER — LEVOTHYROXINE AND LIOTHYRONINE 9.5; 2.25 UG/1; UG/1
15 TABLET ORAL
Refills: 0 | Status: COMPLETED | COMMUNITY
End: 2019-11-22

## 2019-11-22 NOTE — REVIEW OF SYSTEMS
[Constipation] : constipation [Negative] : Heme/Lymph [Abdominal Pain] : no abdominal pain [Vomiting] : no vomiting [Heartburn] : no heartburn [Melena] : no melena

## 2019-11-22 NOTE — HISTORY OF PRESENT ILLNESS
[FreeTextEntry1] : 73 y/o female 5 mon f/u is have swelling in left leg , she has been wearing compression stockings everyday

## 2020-01-21 ENCOUNTER — APPOINTMENT (OUTPATIENT)
Dept: PEDIATRIC ALLERGY IMMUNOLOGY | Facility: CLINIC | Age: 75
End: 2020-01-21
Payer: MEDICARE

## 2020-01-21 VITALS — DIASTOLIC BLOOD PRESSURE: 108 MMHG | HEART RATE: 82 BPM | SYSTOLIC BLOOD PRESSURE: 152 MMHG | OXYGEN SATURATION: 98 %

## 2020-01-21 VITALS — OXYGEN SATURATION: 96 % | HEART RATE: 74 BPM | SYSTOLIC BLOOD PRESSURE: 173 MMHG | DIASTOLIC BLOOD PRESSURE: 95 MMHG

## 2020-01-21 DIAGNOSIS — R60.0 LOCALIZED EDEMA: ICD-10-CM

## 2020-01-21 PROCEDURE — 95018 ALL TSTG PERQ&IQ DRUGS/BIOL: CPT | Mod: GC

## 2020-01-21 PROCEDURE — 95004 PERQ TESTS W/ALRGNC XTRCS: CPT | Mod: GC

## 2020-01-21 PROCEDURE — 96372 THER/PROPH/DIAG INJ SC/IM: CPT | Mod: GC

## 2020-01-21 PROCEDURE — 99214 OFFICE O/P EST MOD 30 MIN: CPT | Mod: 25,GC

## 2020-01-21 RX ORDER — BUDESONIDE AND FORMOTEROL FUMARATE DIHYDRATE 160; 4.5 UG/1; UG/1
160-4.5 AEROSOL RESPIRATORY (INHALATION)
Refills: 0 | Status: ACTIVE | COMMUNITY

## 2020-01-21 RX ORDER — LIDOCAINE HCL 4 %
250-60 CREAM (GRAM) TOPICAL
Refills: 0 | Status: ACTIVE | COMMUNITY

## 2020-01-30 NOTE — HISTORY OF PRESENT ILLNESS
[de-identified] : 74 year old female with breast cancer Stage A1, diagnosed in 2019, presents for evaluation of facial swelling and stridor that occurred 4/3/2019 after the surgery/left breast lumpectomy at [facility]. After discussion with anesthesiologist from case, it was decided to test the following medications: propofol, rocuronium, fentanyl, succinylcholine, dexamethasone, esmolol and lidocaine. ROS negative for rash or any other complaints. Medication list updated.

## 2020-01-30 NOTE — IMPRESSION
[FreeTextEntry1] : Epicutaneous and intradermal skin tests were performed and POSITIVE TO ROCURONIUM AND negative to Propofol, Fentanyl,  Dexamethasone, Esmolol with adequate controls. \par Proper testing concentrations to Esmolol are poorly defined. Skin test (prick) was negative with undiluted esmolol and ID was negative with dilutions of  1:1000- 1:10.\par Negative Lidocaine skin testing and  challenge.

## 2020-01-30 NOTE — REASON FOR VISIT
[Routine Follow-Up] : a routine follow-up visit for [Allergy Evaluation/ Skin Testing] : allergy evaluation and or skin testing [Family Member] : family member [FreeTextEntry2] : Skin testing

## 2020-01-30 NOTE — PHYSICAL EXAM
[Well Nourished] : well nourished [Alert] : alert [No Acute Distress] : no acute distress [Healthy Appearance] : healthy appearance [Normal Voice/Communication] : normal voice communication [Well Developed] : well developed [Normal Rate] : heart rate was normal  [Bilateral Audible Breath Sounds] : bilateral audible breath sounds [Normal S1, S2] : normal S1 and S2 [No murmur] : no murmur [Regular Rhythm] : with a regular rhythm [Normal Affect] : affect was normal [Normal Mood] : mood was normal [Judgment and Insight Age Appropriate] : judgement and insight is age appropriate [Alert, Awake, Oriented as Age-Appropriate] : alert, awake, oriented as age appropriate [Sclera Not Icteric] : sclera not icteric [No Thrush] : no thrush [No Oral Lesions or Ulcers] : no oral lesions or ulcers [Normal Rate and Effort] : normal respiratory rhythm and effort [No Crackles] : no crackles [Soft] : abdomen soft [No Rash] : no rash [No clubbing] : no clubbing [No Cyanosis] : no cyanosis [Conjunctival Erythema] : no conjunctival erythema [Exudate] : no exudate [Wheezing] : no wheezing was heard [Eczematous Patches] : no eczematous patches

## 2020-01-30 NOTE — CONSULT LETTER
[Dear  ___] : Dear  [unfilled], [Consult Letter:] : I had the pleasure of evaluating your patient, [unfilled]. [Please see my note below.] : Please see my note below. [Consult Closing:] : Thank you very much for allowing me to participate in the care of this patient.  If you have any questions, please do not hesitate to contact me. [Sincerely,] : Sincerely, [DrBrittany  ___] : Dr. LOGAN [FreeTextEntry2] : Dr Honeycutt\par [address], [place], [address] [FreeTextEntry3] : Nuha Adams MD FAAMICHAELI, LAINE\par Adult and Pediatric Allergy, Asthma and Clinical Immunology\par  of Medicine and Pediatrics at\par   [facility]\par Section Head, Adult Allergy and Immunology\par   [facility]\par   Division of Allergy, Asthma and Immunology\par   [address], [place] [address]\par   [place], [place] [address]\par   Phone [phone]  Fax [phone]\par \par \par

## 2020-01-30 NOTE — REVIEW OF SYSTEMS
[Chest Pain] : no chest pain or discomfort [Difficulty Breathing] : no dyspnea [SOB at Rest] : no shortness of breath at rest [Cough] : no cough [Urticaria] : no urticaria [Pruritis] : no pruritis

## 2020-02-10 ENCOUNTER — APPOINTMENT (OUTPATIENT)
Dept: PEDIATRIC ALLERGY IMMUNOLOGY | Facility: CLINIC | Age: 75
End: 2020-02-10
Payer: MEDICARE

## 2020-02-10 VITALS
DIASTOLIC BLOOD PRESSURE: 96 MMHG | HEART RATE: 72 BPM | OXYGEN SATURATION: 94 % | HEIGHT: 65 IN | BODY MASS INDEX: 48.32 KG/M2 | WEIGHT: 290 LBS | SYSTOLIC BLOOD PRESSURE: 150 MMHG

## 2020-02-10 DIAGNOSIS — T78.40XD ALLERGY, UNSPECIFIED, SUBSEQUENT ENCOUNTER: ICD-10-CM

## 2020-02-10 DIAGNOSIS — T78.2XXS ANAPHYLACTIC SHOCK, UNSPECIFIED, SEQUELA: ICD-10-CM

## 2020-02-10 DIAGNOSIS — Z01.89 ENCOUNTER FOR OTHER SPECIFIED SPECIAL EXAMINATIONS: ICD-10-CM

## 2020-02-10 DIAGNOSIS — T50.905S ANAPHYLACTIC SHOCK, UNSPECIFIED, SEQUELA: ICD-10-CM

## 2020-02-10 PROCEDURE — 95018 ALL TSTG PERQ&IQ DRUGS/BIOL: CPT

## 2020-02-10 PROCEDURE — 99214 OFFICE O/P EST MOD 30 MIN: CPT | Mod: 25

## 2020-02-11 PROBLEM — T78.40XD ALLERGIC REACTION TO DRUG, SUBSEQUENT ENCOUNTER: Status: ACTIVE | Noted: 2020-01-21

## 2020-02-11 PROBLEM — Z01.89 DIAGNOSTIC SKIN TEST: Status: ACTIVE | Noted: 2020-02-11

## 2020-02-11 PROBLEM — T78.2XXS: Status: ACTIVE | Noted: 2019-09-24

## 2020-02-11 NOTE — REVIEW OF SYSTEMS
[Fever] : no fever [Chest Pain] : no chest pain or discomfort [Difficulty Breathing] : no dyspnea [SOB at Rest] : no shortness of breath at rest [Cough] : no cough [Urticaria] : no urticaria [Atopic Dermatitis] : no atopic dermatitis [Pruritis] : no pruritis [Nl] : Cardiovascular

## 2020-02-11 NOTE — CONSULT LETTER
[Dear  ___] : Dear  [unfilled], [Consult Letter:] : I had the pleasure of evaluating your patient, [unfilled]. [Please see my note below.] : Please see my note below. [Consult Closing:] : Thank you very much for allowing me to participate in the care of this patient.  If you have any questions, please do not hesitate to contact me. [FreeTextEntry2] : Dr Honeycutt\par [address], [place], [address] [Sincerely,] : Sincerely, [FreeTextEntry3] : Nuha Adams MD FAAMICHAELI, LAINE\par Adult and Pediatric Allergy, Asthma and Clinical Immunology\par  of Medicine and Pediatrics at\par   [facility]\par Section Head, Adult Allergy and Immunology\par   [facility]\par   Division of Allergy, Asthma and Immunology\par   [address], [place] [address]\par   [place], [place] [address]\par   Phone [phone]  Fax [phone]\par \par \par  [DrBrittany  ___] : Dr. LOGAN [___] : [unfilled]

## 2020-02-11 NOTE — PHYSICAL EXAM
[Alert] : alert [Well Nourished] : well nourished [No Acute Distress] : no acute distress [Healthy Appearance] : healthy appearance [Normal Voice/Communication] : normal voice communication [Sclera Not Icteric] : sclera not icteric [Conjunctival Erythema] : no conjunctival erythema [No Thrush] : no thrush [No Oral Lesions or Ulcers] : no oral lesions or ulcers [Exudate] : no exudate [Normal Rate and Effort] : normal respiratory rhythm and effort [No Crackles] : no crackles [Wheezing] : no wheezing was heard [Bilateral Audible Breath Sounds] : bilateral audible breath sounds [Normal Rate] : heart rate was normal  [Regular Rhythm] : with a regular rhythm [Soft] : abdomen soft [No Rash] : no rash [Eczematous Patches] : no eczematous patches [No clubbing] : no clubbing [No Cyanosis] : no cyanosis [Normal Mood] : mood was normal [Judgment and Insight Age Appropriate] : judgement and insight is age appropriate [Normal Affect] : affect was normal [Alert, Awake, Oriented as Age-Appropriate] : alert, awake, oriented as age appropriate

## 2020-02-11 NOTE — HISTORY OF PRESENT ILLNESS
[de-identified] : 74 year old female with breast cancer Stage A1, diagnosed in 2019, presents for evaluation of facial swelling and stridor that occurred 4/3/2019 after the surgery/left breast lumpectomy at [facility]. After discussion with anesthesiologist from case, it was decided to test the following medications: propofol, rocuronium, fentanyl, succinylcholine, dexamethasone, esmolol and lidocaine. ROS negative for rash or any other complaints. Medication list updated.

## 2020-02-11 NOTE — IMPRESSION
[FreeTextEntry1] : 1/21/20- Epicutaneous and intradermal skin tests were performed and POSITIVE TO ROCURONIUM AND negative to Propofol, Fentanyl,  Dexamethasone, Esmolol with adequate controls. \par Proper testing concentrations to Esmolol are poorly defined. Skin test (prick) was negative with undiluted esmolol and ID was negative with dilutions of  1:1000- 1:10.\par Negative Lidocaine skin testing and  challenge. \par \par 2/10/20- Skin test was negative to Cisatracurium Besylate (Nimbex) and Succinylcholine.

## 2020-02-11 NOTE — REASON FOR VISIT
[Routine Follow-Up] : a routine follow-up visit for [FreeTextEntry2] : anesthetic skin testing [Other: _____] : [unfilled]

## 2020-07-09 ENCOUNTER — RESULT REVIEW (OUTPATIENT)
Age: 75
End: 2020-07-09

## 2020-07-09 ENCOUNTER — LABORATORY RESULT (OUTPATIENT)
Age: 75
End: 2020-07-09

## 2020-07-09 ENCOUNTER — OUTPATIENT (OUTPATIENT)
Dept: OUTPATIENT SERVICES | Facility: HOSPITAL | Age: 75
LOS: 1 days | Discharge: HOME | End: 2020-07-09
Payer: MEDICARE

## 2020-07-09 ENCOUNTER — APPOINTMENT (OUTPATIENT)
Dept: HEMATOLOGY ONCOLOGY | Facility: CLINIC | Age: 75
End: 2020-07-09
Payer: MEDICARE

## 2020-07-09 ENCOUNTER — OUTPATIENT (OUTPATIENT)
Dept: OUTPATIENT SERVICES | Facility: HOSPITAL | Age: 75
LOS: 1 days | Discharge: HOME | End: 2020-07-09

## 2020-07-09 VITALS
SYSTOLIC BLOOD PRESSURE: 167 MMHG | DIASTOLIC BLOOD PRESSURE: 99 MMHG | HEART RATE: 79 BPM | TEMPERATURE: 97.2 F | RESPIRATION RATE: 145 BRPM

## 2020-07-09 DIAGNOSIS — Z98.890 OTHER SPECIFIED POSTPROCEDURAL STATES: Chronic | ICD-10-CM

## 2020-07-09 DIAGNOSIS — R92.8 OTHER ABNORMAL AND INCONCLUSIVE FINDINGS ON DIAGNOSTIC IMAGING OF BREAST: ICD-10-CM

## 2020-07-09 PROCEDURE — 99214 OFFICE O/P EST MOD 30 MIN: CPT

## 2020-07-09 PROCEDURE — G0279: CPT | Mod: 26

## 2020-07-09 PROCEDURE — 77065 DX MAMMO INCL CAD UNI: CPT | Mod: 26,LT

## 2020-07-11 LAB
ALBUMIN SERPL ELPH-MCNC: 4.3 G/DL
ALP BLD-CCNC: 57 U/L
ALT SERPL-CCNC: 18 U/L
ANION GAP SERPL CALC-SCNC: 14 MMOL/L
AST SERPL-CCNC: 26 U/L
BILIRUB SERPL-MCNC: 0.3 MG/DL
BUN SERPL-MCNC: 8 MG/DL
CALCIUM SERPL-MCNC: 9.1 MG/DL
CHLORIDE SERPL-SCNC: 103 MMOL/L
CO2 SERPL-SCNC: 27 MMOL/L
CREAT SERPL-MCNC: 0.7 MG/DL
GLUCOSE SERPL-MCNC: 80 MG/DL
HCT VFR BLD CALC: 38.9 %
HGB BLD-MCNC: 12.4 G/DL
MCHC RBC-ENTMCNC: 30.3 PG
MCHC RBC-ENTMCNC: 31.9 G/DL
MCV RBC AUTO: 95.1 FL
PLATELET # BLD AUTO: 139 K/UL
PMV BLD: 12 FL
POTASSIUM SERPL-SCNC: 4.7 MMOL/L
PROT SERPL-MCNC: 6.7 G/DL
RBC # BLD: 4.09 M/UL
RBC # FLD: 12.3 %
SODIUM SERPL-SCNC: 144 MMOL/L
WBC # FLD AUTO: 5.39 K/UL

## 2020-07-11 NOTE — HISTORY OF PRESENT ILLNESS
[de-identified] : 73 yo female is referred by Dr. Stewart for consultation of systemic adjuvant therapy for newly diagnosed left sided breast cancer. The patient had a screening mammogram on 12/13/2018 where segmental calcifications were identified in the left breast at 12:00 position middle depth. She was subsequently recalled for a diagnostic mammogram, performed on 1/22/2019. Spot magnification views demonstrated a 6 cm area of segmental amorphous calcifications in the left breast at 12:00 position middle depth, for which a stereotactic biopsy was recommended. \par \par A stereotactic biopsy performed on 1/22/2019 demonstrated invasive moderately differentiated ductal carcinoma and sclerosing intraductal papilloma at the most anterior aspect of the biopsied calcifications and fibroadenomatoid change at the most posterior aspect of the biopsied calcifications. \par Estrogen receptor positive %\par Progesterone receptor positive %\par HER2 negative Negative 0\par Ki-67: 3%\par \par On 2/23/19, b/l breast MRI showed:\par RIGHT BREAST: \par Slightly irregular, linear nonmass enhancement in the central aspect of the right breast measures approximately 0.7 cm. MRI guided biopsy is recommended. No additional suspicious abnormal enhancement is seen in the right breast. No right axillary adenopathy. \par LEFT BREAST: \par 2 biopsy clips are seen in the superior aspect of the left breast. Mild patchy nonmass enhancement is seen surrounding the biopsy clips; linear nonmass enhancement is seen along the anterior and lateral aspect of the superior biopsy clip measuring approximately 1.8 cm. These correspond to the calcifications seen on the mammogram and to the biopsy-proven carcinoma. \par \par On 3/7/19, MRI guided core biopsy of right nonmass enhancement showed proliferative type fibrocystic changes. \par \par On 4/3/19, she underwent Left UOQ NLOC and Left SLNB. The pathology demonstrated 20 mm invasive well differentiated IDC and non extensive DCIS solid and cribriform types with comedo necrosis and calcifications low to intermediate nuclear grade; no LVI is seen; with negative margins. 0/2 (-) SLNB; AJCC 8th Edition Pathologic Stage: pT1c, p(sn)N0, pMx. \par \par The surgical specimen was sent for Oncotype dx analysis. her RS is 6, in the low risk range and predicting 3% risk of distant recurrence with endocrine therapy.\par \par The patient recovered well from surgery. She is here with her daughter to discuss systemic adjuvant therapy. \par \par Her PMH is significant for subsegmental PE found in 4/2019. She has been taking Eliquis. She has b/l leg venous insufficiency. \par \par She has no family history of breast cancer and ovarian cancer. \par  [de-identified] : 8/29/19\par Patient is here today for follow up visit accompanied by her daughter, Brie. She started Anastrozole 6/2019.  Initially she experienced fatigue but now tolerating it a little better. She takes Oxycodone prn for low back pain. Her daughter will take her to pain management, Dr. Harley. She wants to continue Anastrozole for now but may need to switch AI if pain worsens.\par She saw nash Morales, today for follow up of PE on Eliquis.  Repeat VQ scan and CXR ordered.  F/U appt 10/16/19.\par She had bone density on 6/13/19 which showed osteopenia in femoral neck.  She takes calcium and Vit D.\par \par 11/14/19:\par Patient is here today for follow up visit accompanied by her daughter. She has been taking Anastrozole daily since 6/2019. She has chronic joint aching and back pain. She takes Oxycodone prn for low back pain. In 9/2019, she had MRI lumbar spine which showed degenerative change, disc bulging and spinal stenosis. She sees Dr. Harley for pain management. She is taking Eliquis for h/o PE.\par She does not have breast related symptoms. She had b/l dx mammo in 11/2019. There is no suspicious finding. Bone density in 6/2019 showed mild osteopenia in femoral neck.  \par  \par 7/9/2020:\par Patient is here today for follow up visit. Her daughter is with her. She was diagnosed with stage IA ([id number]) IDC of the left breast, G1, ER/MI positive, Her-2 negative, s/p left breast lumpectomy and SLNB in 4/2019. She has been taking Anastrozole daily since 6/2019. She has chronic joint aching and back pain. She takes Oxycodone prn for low back pain. In 9/2019, she had MRI lumbar spine which showed degenerative change, disc bulging and spinal stenosis. She sees Dr. Harley for pain management. She had provoked segmental PE in 4/2019 and has been on Eliquis for more than one year.  \par She had left breast dx mammo and US today. There was no suspicious finding. She does not have breast related symptoms. Bone density in 6/2019 showed mild osteopenia in femoral neck.  \par

## 2020-07-11 NOTE — PHYSICAL EXAM
[Restricted in physically strenuous activity but ambulatory and able to carry out work of a light or sedentary nature] : Status 1- Restricted in physically strenuous activity but ambulatory and able to carry out work of a light or sedentary nature, e.g., light house work, office work [Normal] : affect appropriate [de-identified] : Status post left breast lumpectomy and SLNB. The surgical scars are healing well.

## 2020-07-11 NOTE — CONSULT LETTER
[Courtesy Letter:] : I had the pleasure of seeing your patient, [unfilled], in my office today. [Please see my note below.] : Please see my note below. [Dear  ___] : Dear  [unfilled], [FreeTextEntry3] : Herman Flores MD [Sincerely,] : Sincerely, [DrBrittany  ___] : Dr. LOGAN

## 2020-07-11 NOTE — ASSESSMENT
[FreeTextEntry1] : 75 yo female has stage IA ([id number]) IDC of the left breast, G1, ER/ME positive, Her-2 negative, s/p left breast lumpectomy and SLNB.\par RS 6.\par \par Recommendation:\par -- Continue Anastrozole daily, calcium and vitamin D supplement. The sided effects of AI reviewed again. She wants to stay on Anastrozole for now and consider switch if muscular skeletal symptoms get worse. \par -- She had provoked segmental PE in 4/2019 and has been on Eliquis for > one year. Will order CT chest with contrast for re-evaluation.  Followup with Dr. Hickey.\par -- Will order blood work for CBC and CMP.\par -- b/l dx mammo and left breast US in 6 months. \par -- Followup with PCP for other medical problems.\par -- RTO for followup in 6 months.\par \par \par \par \par

## 2020-07-13 DIAGNOSIS — Z02.9 ENCOUNTER FOR ADMINISTRATIVE EXAMINATIONS, UNSPECIFIED: ICD-10-CM

## 2020-07-14 DIAGNOSIS — Z79.811 LONG TERM (CURRENT) USE OF AROMATASE INHIBITORS: ICD-10-CM

## 2020-07-14 DIAGNOSIS — C50.412 MALIGNANT NEOPLASM OF UPPER-OUTER QUADRANT OF LEFT FEMALE BREAST: ICD-10-CM

## 2020-07-14 DIAGNOSIS — Z86.711 PERSONAL HISTORY OF PULMONARY EMBOLISM: ICD-10-CM

## 2020-07-15 DIAGNOSIS — R92.8 OTHER ABNORMAL AND INCONCLUSIVE FINDINGS ON DIAGNOSTIC IMAGING OF BREAST: ICD-10-CM

## 2020-07-22 ENCOUNTER — APPOINTMENT (OUTPATIENT)
Dept: CARDIOLOGY | Facility: CLINIC | Age: 75
End: 2020-07-22
Payer: MEDICARE

## 2020-07-22 ENCOUNTER — APPOINTMENT (OUTPATIENT)
Dept: VASCULAR SURGERY | Facility: CLINIC | Age: 75
End: 2020-07-22
Payer: MEDICARE

## 2020-07-22 VITALS — HEART RATE: 64 BPM | RESPIRATION RATE: 18 BRPM | SYSTOLIC BLOOD PRESSURE: 130 MMHG | DIASTOLIC BLOOD PRESSURE: 80 MMHG

## 2020-07-22 VITALS — BODY MASS INDEX: 48.82 KG/M2 | WEIGHT: 293 LBS | HEIGHT: 65 IN

## 2020-07-22 PROCEDURE — 93970 EXTREMITY STUDY: CPT

## 2020-07-22 PROCEDURE — 99213 OFFICE O/P EST LOW 20 MIN: CPT

## 2020-07-22 PROCEDURE — 93000 ELECTROCARDIOGRAM COMPLETE: CPT

## 2020-07-22 PROCEDURE — 99214 OFFICE O/P EST MOD 30 MIN: CPT

## 2020-07-22 RX ORDER — FUROSEMIDE 40 MG/1
40 TABLET ORAL
Refills: 0 | Status: ACTIVE | COMMUNITY

## 2020-07-22 RX ORDER — OXYCODONE AND ACETAMINOPHEN 10; 325 MG/1; MG/1
10-325 TABLET ORAL
Refills: 0 | Status: ACTIVE | COMMUNITY

## 2020-07-22 NOTE — HISTORY OF PRESENT ILLNESS
[FreeTextEntry1] : 75 y/o female  f/u has a history of swelling in left leg and cellulitis. She has been wearing compression stockings everyday.

## 2020-07-22 NOTE — REVIEW OF SYSTEMS
[Constipation] : constipation [Negative] : Heme/Lymph [Abdominal Pain] : no abdominal pain [Heartburn] : no heartburn [Vomiting] : no vomiting [Melena] : no melena

## 2020-07-22 NOTE — PHYSICAL EXAM
[General Appearance - Well Developed] : well developed [Normal Appearance] : normal appearance [Well Groomed] : well groomed [General Appearance - Well Nourished] : well nourished [No Deformities] : no deformities [General Appearance - In No Acute Distress] : no acute distress [Normal Conjunctiva] : the conjunctiva exhibited no abnormalities [Eyelids - No Xanthelasma] : the eyelids demonstrated no xanthelasmas [No Oral Cyanosis] : no oral cyanosis [Normal Oral Mucosa] : normal oral mucosa [No Oral Pallor] : no oral pallor [Normal Jugular Venous V Waves Present] : normal jugular venous V waves present [Normal Jugular Venous A Waves Present] : normal jugular venous A waves present [Heart Rate And Rhythm] : heart rate and rhythm were normal [No Jugular Venous Schmitt A Waves] : no jugular venous schmitt A waves [Murmurs] : no murmurs present [Heart Sounds] : normal S1 and S2 [Exaggerated Use Of Accessory Muscles For Inspiration] : no accessory muscle use [Auscultation Breath Sounds / Voice Sounds] : lungs were clear to auscultation bilaterally [Respiration, Rhythm And Depth] : normal respiratory rhythm and effort [] : no hepato-splenomegaly [Abdomen Tenderness] : non-tender [Bowel Sounds] : normal bowel sounds [Abdomen Soft] : soft [Abdomen Mass (___ Cm)] : no abdominal mass palpated [Abnormal Walk] : normal gait [FreeTextEntry1] : venous stasis [Oriented To Time, Place, And Person] : oriented to person, place, and time

## 2020-07-24 ENCOUNTER — APPOINTMENT (OUTPATIENT)
Dept: BREAST CENTER | Facility: CLINIC | Age: 75
End: 2020-07-24
Payer: MEDICARE

## 2020-07-24 ENCOUNTER — OUTPATIENT (OUTPATIENT)
Dept: OUTPATIENT SERVICES | Facility: HOSPITAL | Age: 75
LOS: 1 days | Discharge: HOME | End: 2020-07-24
Payer: MEDICARE

## 2020-07-24 ENCOUNTER — RESULT REVIEW (OUTPATIENT)
Age: 75
End: 2020-07-24

## 2020-07-24 VITALS — TEMPERATURE: 97.2 F | HEIGHT: 65 IN | WEIGHT: 293 LBS | BODY MASS INDEX: 48.82 KG/M2

## 2020-07-24 DIAGNOSIS — Z98.890 OTHER SPECIFIED POSTPROCEDURAL STATES: Chronic | ICD-10-CM

## 2020-07-24 DIAGNOSIS — I26.99 OTHER PULMONARY EMBOLISM WITHOUT ACUTE COR PULMONALE: ICD-10-CM

## 2020-07-24 PROCEDURE — 71260 CT THORAX DX C+: CPT | Mod: 26

## 2020-07-24 PROCEDURE — 99213 OFFICE O/P EST LOW 20 MIN: CPT

## 2020-07-24 NOTE — PAST MEDICAL HISTORY
[Menarche Age ____] : age at menarche was [unfilled] [Postmenopausal] : The patient is postmenopausal [Menopause Age____] : age at menopause was [unfilled] [Total Preg ___] : G[unfilled] [Live Births ___] : P[unfilled]  [Age At Live Birth ___] : Age at live birth: [unfilled] [History of Hormone Replacement Treatment] : has no history of hormone replacement treatment [FreeTextEntry5] : Unilateral oophorectomy due to fibroids 1988. [FreeTextEntry6] : none [FreeTextEntry8] : none [FreeTextEntry7] : OCP for 0-5 years; discontinued in 1988.

## 2020-07-24 NOTE — HISTORY OF PRESENT ILLNESS
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 \par \par Right benign breast tissue with proliferative type fibrocystic changes on MR guided core biopsy 3/7/19; Central, 7 mm (cork).  \par \par \par Status post Left NLOC and Left SLNB 4/3/19 demonstrating 0/2 (-) SLNB; LUOQ NLOC two healing prior biopsy sites 20 mm with invasive well differentiated IDC and non extensive DCIS solid and cribiform types with comedo necrosis and calcifications low to intermediate nuclear grade; no LVI is seen; with negative margins.  AJCC 8th Edition Pathologic Stage: pT1c, p(sn)N0, pMx.\par \par Dr. Sandra Rondon.\par Patient met with radiation oncology; patient and her daughter refuse radiation therapy.  \par \par EL STEIN is a 75 year old female patient who presents today in follow up for left breast cancer.\par Since her last visit, she has no new breast related complaints. \par Imaging of the left breast was done on 07/09/2020, which revealed improving post lumpectomy changes in the left breast. No mammographic evidence of malignancy.\par \par She presents today for evaluation and imaging review.

## 2020-07-24 NOTE — REVIEW OF SYSTEMS
[Limb Swelling] : limb swelling [Joint Stiffness] : joint stiffness [Negative] : Constitutional [Breast Pain] : no breast pain [Breast Lump] : no breast lump [Nipple Inverted] : no inversion of the nipple [Nipple Discharge] : no nipple discharge

## 2020-07-24 NOTE — PHYSICAL EXAM
[Atraumatic] : atraumatic [Normocephalic] : normocephalic [No dominant masses] : no dominant masses in right breast  [No dominant masses] : no dominant masses left breast [No Nipple Discharge] : no left nipple discharge [No Rashes] : no rashes [Breast Nipple Inversion] : nipples not inverted [de-identified] : well healed surgical scars.\par Palpable scar tissue.  [Breast Nipple Retraction] : nipples not retracted [de-identified] : No axillary lymphadenopathy appreciated. [de-identified] : No axillary lymphadenopathy appreciated.

## 2020-07-24 NOTE — ASSESSMENT
[FreeTextEntry1] : EL is a agusto 75 year old patient who presented today in follow up for a history of left breast cancer.  \par She has been doing well with no new breast related complaints. \par Imaging was done recently which revealed  improving post lumpectomy changes in the left breast. No mammographic evidence of malignancy, as detailed above. \par Physical exam was unrevealing today.\par \par Imaging with a bilateral diagnostic mammogram and left breast sonogram will be due in January 2021, and that will be scheduled today. \par She will return for follow-up and clinical breast exam in January 2021.\par She will continue follow-up with medical oncology as well. \par \par I spent a total of 15 minutes of face to face time with this patient, greater than 50% of which was spent in counseling and/or coordination of care.\par All of her questions were appropriately answered.\par She knows to call with any concerns.

## 2020-09-09 NOTE — PATIENT PROFILE ADULT - NSPROGENPREVTRANSF_GEN_A_NUR
[] : septum deviated to the left [Normal] : lingual tonsils are normal [Midline] : trachea located in midline position [Nasal Endoscopy Performed] : nasal endoscopy was performed, see procedure section for findings no

## 2020-11-19 ENCOUNTER — OUTPATIENT (OUTPATIENT)
Dept: OUTPATIENT SERVICES | Facility: HOSPITAL | Age: 75
LOS: 1 days | Discharge: HOME | End: 2020-11-19
Payer: MEDICARE

## 2020-11-19 DIAGNOSIS — Z98.890 OTHER SPECIFIED POSTPROCEDURAL STATES: Chronic | ICD-10-CM

## 2020-11-19 DIAGNOSIS — R91.8 OTHER NONSPECIFIC ABNORMAL FINDING OF LUNG FIELD: ICD-10-CM

## 2020-11-19 PROCEDURE — 71250 CT THORAX DX C-: CPT | Mod: 26

## 2021-01-01 NOTE — ED ADULT NURSE NOTE - CAS DISCH TRANSFER METHOD
Placed call to pt, she will switch to Similar sensitive  Regarding rash - she will send more pictures through the portal  She had no further questions  Private car

## 2021-01-12 ENCOUNTER — RESULT REVIEW (OUTPATIENT)
Age: 76
End: 2021-01-12

## 2021-01-12 ENCOUNTER — OUTPATIENT (OUTPATIENT)
Dept: OUTPATIENT SERVICES | Facility: HOSPITAL | Age: 76
LOS: 1 days | Discharge: HOME | End: 2021-01-12
Payer: MEDICARE

## 2021-01-12 DIAGNOSIS — Z98.890 OTHER SPECIFIED POSTPROCEDURAL STATES: Chronic | ICD-10-CM

## 2021-01-12 DIAGNOSIS — R92.8 OTHER ABNORMAL AND INCONCLUSIVE FINDINGS ON DIAGNOSTIC IMAGING OF BREAST: ICD-10-CM

## 2021-01-12 PROCEDURE — 77066 DX MAMMO INCL CAD BI: CPT | Mod: 26

## 2021-01-12 PROCEDURE — 76642 ULTRASOUND BREAST LIMITED: CPT | Mod: 26,LT

## 2021-01-12 PROCEDURE — G0279: CPT | Mod: 26

## 2021-01-21 ENCOUNTER — OUTPATIENT (OUTPATIENT)
Dept: OUTPATIENT SERVICES | Facility: HOSPITAL | Age: 76
LOS: 1 days | Discharge: HOME | End: 2021-01-21

## 2021-01-21 ENCOUNTER — APPOINTMENT (OUTPATIENT)
Dept: HEMATOLOGY ONCOLOGY | Facility: CLINIC | Age: 76
End: 2021-01-21
Payer: MEDICARE

## 2021-01-21 ENCOUNTER — APPOINTMENT (OUTPATIENT)
Dept: CARDIOLOGY | Facility: CLINIC | Age: 76
End: 2021-01-21
Payer: MEDICARE

## 2021-01-21 VITALS
WEIGHT: 293 LBS | HEART RATE: 66 BPM | TEMPERATURE: 96.7 F | DIASTOLIC BLOOD PRESSURE: 106 MMHG | SYSTOLIC BLOOD PRESSURE: 149 MMHG | HEIGHT: 64 IN | BODY MASS INDEX: 50.02 KG/M2

## 2021-01-21 VITALS — HEIGHT: 64 IN | BODY MASS INDEX: 50.02 KG/M2 | WEIGHT: 293 LBS | TEMPERATURE: 96.9 F

## 2021-01-21 VITALS — RESPIRATION RATE: 18 BRPM | DIASTOLIC BLOOD PRESSURE: 86 MMHG | SYSTOLIC BLOOD PRESSURE: 130 MMHG | HEART RATE: 80 BPM

## 2021-01-21 DIAGNOSIS — Z98.890 OTHER SPECIFIED POSTPROCEDURAL STATES: Chronic | ICD-10-CM

## 2021-01-21 PROCEDURE — 99214 OFFICE O/P EST MOD 30 MIN: CPT

## 2021-01-21 PROCEDURE — 93000 ELECTROCARDIOGRAM COMPLETE: CPT

## 2021-01-21 NOTE — PHYSICAL EXAM
[Normal Appearance] : normal appearance [General Appearance - Well Developed] : well developed [Well Groomed] : well groomed [No Deformities] : no deformities [General Appearance - Well Nourished] : well nourished [General Appearance - In No Acute Distress] : no acute distress [Normal Conjunctiva] : the conjunctiva exhibited no abnormalities [Eyelids - No Xanthelasma] : the eyelids demonstrated no xanthelasmas [Normal Oral Mucosa] : normal oral mucosa [No Oral Pallor] : no oral pallor [No Oral Cyanosis] : no oral cyanosis [Normal Jugular Venous A Waves Present] : normal jugular venous A waves present [Normal Jugular Venous V Waves Present] : normal jugular venous V waves present [No Jugular Venous Schmitt A Waves] : no jugular venous schmitt A waves [Heart Rate And Rhythm] : heart rate and rhythm were normal [Heart Sounds] : normal S1 and S2 [Murmurs] : no murmurs present [Respiration, Rhythm And Depth] : normal respiratory rhythm and effort [Exaggerated Use Of Accessory Muscles For Inspiration] : no accessory muscle use [Bowel Sounds] : normal bowel sounds [Auscultation Breath Sounds / Voice Sounds] : lungs were clear to auscultation bilaterally [Abdomen Soft] : soft [Abdomen Tenderness] : non-tender [] : no hepato-splenomegaly [Abdomen Mass (___ Cm)] : no abdominal mass palpated [Abnormal Walk] : normal gait [FreeTextEntry1] : venous stasis [Oriented To Time, Place, And Person] : oriented to person, place, and time

## 2021-01-21 NOTE — ASSESSMENT
[FreeTextEntry1] : 73 yo female has stage IA ([id number]) IDC of the left breast, G1, ER/LA positive, Her-2 negative, s/p left breast lumpectomy and SLNB.\par RS 6.\par \par Recommendation:\par -- Continue Anastrozole daily, calcium and vitamin D supplement.\par -- Reviewed bone density report. She has mild osteopenia in the femoral neck. Encourage more physical activities. She will have repeat bone density in 6/2021.  \par -- Mammo and US results reviewed with the patient. It shows Less prominent postoperative changes in the left breast. She will have repeat left breast dx mammo and US in 7/2021.\par -- She had provoked segmental PE in 4/2019 and completed Eliquis. Repeat CT showed resolution of subsegmental pulmonary emboli in the right lower lobe.\par -- Followup with PCP for other medical problems.\par -- Followup with Dr. Harley for back pain.\par -- RTO for followup in 6 months.\par \par \par \par \par

## 2021-01-21 NOTE — PHYSICAL EXAM
[Restricted in physically strenuous activity but ambulatory and able to carry out work of a light or sedentary nature] : Status 1- Restricted in physically strenuous activity but ambulatory and able to carry out work of a light or sedentary nature, e.g., light house work, office work [Normal] : affect appropriate [de-identified] : Status post left breast lumpectomy and SLNB. The surgical scars are healing well.

## 2021-01-21 NOTE — HISTORY OF PRESENT ILLNESS
[de-identified] : 75 yo female is referred by Dr. Stewart for consultation of systemic adjuvant therapy for newly diagnosed left sided breast cancer. The patient had a screening mammogram on 12/13/2018 where segmental calcifications were identified in the left breast at 12:00 position middle depth. She was subsequently recalled for a diagnostic mammogram, performed on 1/22/2019. Spot magnification views demonstrated a 6 cm area of segmental amorphous calcifications in the left breast at 12:00 position middle depth, for which a stereotactic biopsy was recommended. \par \par A stereotactic biopsy performed on 1/22/2019 demonstrated invasive moderately differentiated ductal carcinoma and sclerosing intraductal papilloma at the most anterior aspect of the biopsied calcifications and fibroadenomatoid change at the most posterior aspect of the biopsied calcifications. \par Estrogen receptor positive %\par Progesterone receptor positive %\par HER2 negative Negative 0\par Ki-67: 3%\par \par On 2/23/19, b/l breast MRI showed:\par RIGHT BREAST: \par Slightly irregular, linear nonmass enhancement in the central aspect of the right breast measures approximately 0.7 cm. MRI guided biopsy is recommended. No additional suspicious abnormal enhancement is seen in the right breast. No right axillary adenopathy. \par LEFT BREAST: \par 2 biopsy clips are seen in the superior aspect of the left breast. Mild patchy nonmass enhancement is seen surrounding the biopsy clips; linear nonmass enhancement is seen along the anterior and lateral aspect of the superior biopsy clip measuring approximately 1.8 cm. These correspond to the calcifications seen on the mammogram and to the biopsy-proven carcinoma. \par \par On 3/7/19, MRI guided core biopsy of right nonmass enhancement showed proliferative type fibrocystic changes. \par \par On 4/3/19, she underwent Left UOQ NLOC and Left SLNB. The pathology demonstrated 20 mm invasive well differentiated IDC and non extensive DCIS solid and cribriform types with comedo necrosis and calcifications low to intermediate nuclear grade; no LVI is seen; with negative margins. 0/2 (-) SLNB; AJCC 8th Edition Pathologic Stage: pT1c, p(sn)N0, pMx. \par \par The surgical specimen was sent for Oncotype dx analysis. her RS is 6, in the low risk range and predicting 3% risk of distant recurrence with endocrine therapy.\par \par The patient recovered well from surgery. She is here with her daughter to discuss systemic adjuvant therapy. \par \par Her PMH is significant for subsegmental PE found in 4/2019. She has been taking Eliquis. She has b/l leg venous insufficiency. \par \par She has no family history of breast cancer and ovarian cancer. \par  [de-identified] : 8/29/19\par Patient is here today for follow up visit accompanied by her daughter, Brie. She started Anastrozole 6/2019.  Initially she experienced fatigue but now tolerating it a little better. She takes Oxycodone prn for low back pain. Her daughter will take her to pain management, Dr. Harley. She wants to continue Anastrozole for now but may need to switch AI if pain worsens.\par She saw nash Morales, today for follow up of PE on Eliquis.  Repeat VQ scan and CXR ordered.  F/U appt 10/16/19.\par She had bone density on 6/13/19 which showed osteopenia in femoral neck.  She takes calcium and Vit D.\par \par 11/14/19:\par Patient is here today for follow up visit accompanied by her daughter. She has been taking Anastrozole daily since 6/2019. She has chronic joint aching and back pain. She takes Oxycodone prn for low back pain. In 9/2019, she had MRI lumbar spine which showed degenerative change, disc bulging and spinal stenosis. She sees Dr. Harley for pain management. She is taking Eliquis for h/o PE.\par She does not have breast related symptoms. She had b/l dx mammo in 11/2019. There is no suspicious finding. Bone density in 6/2019 showed mild osteopenia in femoral neck.  \par  \par 7/9/2020:\par Patient is here today for follow up visit. Her daughter is with her. She was diagnosed with stage IA ([id number]) IDC of the left breast, G1, ER/[place] positive, Her-2 negative, s/p left breast lumpectomy and SLNB in 4/2019. She has been taking Anastrozole daily since 6/2019. She has chronic joint aching and back pain. She takes Oxycodone prn for low back pain. In 9/2019, she had MRI lumbar spine which showed degenerative change, disc bulging and spinal stenosis. She sees Dr. Harley for pain management. She had provoked segmental PE in 4/2019 and has been on Eliquis for more than one year.  \par She had left breast dx mammo and US today. There was no suspicious finding. She does not have breast related symptoms. Bone density in 6/2019 showed mild osteopenia in femoral neck.  \par  \par 1/21/2021:\par Patient is here today for follow up visit. Her daughter is with her. She was diagnosed with stage IA ([id number]) IDC of the left breast, G1, ER/[place] positive, Her-2 negative, s/p left breast lumpectomy and SLNB in 4/2019. She has been taking Anastrozole daily since 6/2019. She has chronic joint aching and back pain. She takes Oxycodone prn for low back pain. In 9/2019, she had MRI lumbar spine which showed degenerative change, disc bulging and spinal stenosis. She sees Dr. Harley for pain management. \par She had provoked segmental PE in 4/2019 and was Eliquis for more than one year.  The repeat CT chest in 7/2020 showed No filling defects in the main pulmonary artery or segmental branches to suggest pulmonary embolus. Interval resolution of subsegmental pulmonary emboli in the right lower lobe. New groundglass opacity  in the left upper lobe. Differential includes infectious or inflammatory etiology. She saw Dr. Hickey and was given a course of ABx and had repeat CT chest which showed resolution of groundglass opacity.\par She had b/l breast dx mammo and left breast US on 1/12/2021 which showed Within the lumpectomy site at the 12:00 position, 7 cm from the nipple, there is an oval circumscribed pocket of complex fluid measuring 2.3 x 2.5 x 1.4 cm. This has markedly decreased since the prior exam with measured up to 6.1 cm\par

## 2021-02-10 DIAGNOSIS — C50.412 MALIGNANT NEOPLASM OF UPPER-OUTER QUADRANT OF LEFT FEMALE BREAST: ICD-10-CM

## 2021-02-10 DIAGNOSIS — Z79.811 LONG TERM (CURRENT) USE OF AROMATASE INHIBITORS: ICD-10-CM

## 2021-03-20 ENCOUNTER — APPOINTMENT (OUTPATIENT)
Dept: ENDOCRINOLOGY | Facility: CLINIC | Age: 76
End: 2021-03-20
Payer: MEDICARE

## 2021-03-20 PROCEDURE — G2012 BRIEF CHECK IN BY MD/QHP: CPT

## 2021-04-20 ENCOUNTER — APPOINTMENT (OUTPATIENT)
Dept: BREAST CENTER | Facility: CLINIC | Age: 76
End: 2021-04-20
Payer: MEDICARE

## 2021-04-20 PROCEDURE — 99213 OFFICE O/P EST LOW 20 MIN: CPT

## 2021-04-20 NOTE — DATA REVIEWED
[FreeTextEntry1] : B/L Dx Mammo & Left Sono - 01/12/2021:\par FINDINGS:\par \par Mammogram-\par 2-D/tomosynthesis full field ML as well as spot views in the CC and MLO projections of the left breast. Right CC and MLO views were obtained as well. Computer-aided detection was utilized in the interpretation of this examination.\par \par Breast composition: There are scattered areas of fibroglandular density.\par \par The lumpectomy site in the upper outer quadrant of the left breast demonstrates benign postoperative architectural distortion as well as essentially resolved collection. Several calcifications are noted at the lumpectomy site, stable. No new microcalcifications.\par \par Left Breast Sonogram-\par \par Targeted breast ultrasound was performed.\par \par Within the lumpectomy site at the 12:00 position, 7 cm from the nipple, there is an oval circumscribed pocket of complex fluid measuring 2.3 x 2.5 x 1.4 cm. This has markedly decreased since the prior exam with measured up to 6.1 cm\par \par IMPRESSION:\par \par Less prominent postoperative changes in the left breast.\par \par \par RECOMMENDATION: As per post lumpectomy routine, a follow-up unilateral left\par mammogram are recommended.\par \par BI-RADS Category 2: Benign

## 2021-04-20 NOTE — REVIEW OF SYSTEMS
[Joint Stiffness] : joint stiffness [Limb Swelling] : limb swelling [Negative] : Constitutional [Breast Pain] : no breast pain [Breast Lump] : no breast lump [Nipple Discharge] : no nipple discharge [Nipple Inverted] : no inversion of the nipple

## 2021-04-20 NOTE — REASON FOR VISIT
[Follow-Up: _____] : a [unfilled] follow-up visit [Family Member] : family member [FreeTextEntry1] : left breast cancer; imaging review.

## 2021-04-20 NOTE — HISTORY OF PRESENT ILLNESS
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 \par \par Right benign breast tissue with proliferative type fibrocystic changes on MR guided core biopsy 3/7/19; Central, 7 mm (cork).  \par \par \par Status post Left NLOC and Left SLNB 4/3/19 demonstrating 0/2 (-) SLNB; LUOQ NLOC two healing prior biopsy sites 20 mm with invasive well differentiated IDC and non extensive DCIS solid and cribiform types with comedo necrosis and calcifications low to intermediate nuclear grade; no LVI is seen; with negative margins.  AJCC 8th Edition Pathologic Stage: pT1c, p(sn)N0, pMx.\par \par Dr. Sandra Rondon.\par Patient met with radiation oncology; patient and her daughter refuse radiation therapy.  \par \par EL STEIN is a 76 year old female patient who presents today in follow up for left breast cancer.\par Since her last visit, she has no new breast related complaints. \par Imaging was done on 01/12/2021, which revealed less prominent postoperative changes in the left breast.\par \par She presents today for evaluation and imaging review.

## 2021-04-20 NOTE — PHYSICAL EXAM
[Normocephalic] : normocephalic [Atraumatic] : atraumatic [No Supraclavicular Adenopathy] : no supraclavicular adenopathy [No dominant masses] : no dominant masses in right breast  [No dominant masses] : no dominant masses left breast [No Nipple Discharge] : no left nipple discharge [No Rashes] : no rashes [No Ulceration] : no ulceration [Breast Nipple Inversion] : nipples not inverted [Breast Nipple Retraction] : nipples not retracted [de-identified] : well healed surgical scars.\par Palpable scar tissue.  [de-identified] : No axillary lymphadenopathy appreciated. [de-identified] : No axillary lymphadenopathy appreciated.

## 2021-04-20 NOTE — ASSESSMENT
[FreeTextEntry1] : EL is a agusto 76 year old patient who presented today in follow up for a history of left breast cancer.  \par She has been doing well with no new breast related complaints. \par Imaging was done recently which revealed less prominent postoperative changes in the left breast, as detailed above. \par Physical exam was unrevealing today.\par \par Imaging with a unilateral left diagnostic mammogram and sonogram will be due in July 2021, and that will be scheduled today. \par She will return for follow-up and clinical breast exam in Sept 2021 with Dr. Stewart.\par She will continue follow-up with medical oncology as well. \par \par I spent a total of 20 minutes of face to face time with this patient, greater than 50% of which was spent in counseling and/or coordination of care.\par All of her questions were appropriately answered.\par She knows to call with any concerns.

## 2021-07-13 ENCOUNTER — OUTPATIENT (OUTPATIENT)
Dept: OUTPATIENT SERVICES | Facility: HOSPITAL | Age: 76
LOS: 1 days | Discharge: HOME | End: 2021-07-13
Payer: MEDICARE

## 2021-07-13 ENCOUNTER — RESULT REVIEW (OUTPATIENT)
Age: 76
End: 2021-07-13

## 2021-07-13 DIAGNOSIS — Z98.890 OTHER SPECIFIED POSTPROCEDURAL STATES: Chronic | ICD-10-CM

## 2021-07-13 DIAGNOSIS — R92.8 OTHER ABNORMAL AND INCONCLUSIVE FINDINGS ON DIAGNOSTIC IMAGING OF BREAST: ICD-10-CM

## 2021-07-13 PROCEDURE — 76642 ULTRASOUND BREAST LIMITED: CPT | Mod: 26,LT

## 2021-07-13 PROCEDURE — G0279: CPT | Mod: 26

## 2021-07-13 PROCEDURE — 77065 DX MAMMO INCL CAD UNI: CPT | Mod: 26,LT

## 2021-07-14 ENCOUNTER — NON-APPOINTMENT (OUTPATIENT)
Age: 76
End: 2021-07-14

## 2021-07-21 ENCOUNTER — APPOINTMENT (OUTPATIENT)
Dept: VASCULAR SURGERY | Facility: CLINIC | Age: 76
End: 2021-07-21
Payer: MEDICARE

## 2021-07-21 ENCOUNTER — APPOINTMENT (OUTPATIENT)
Dept: HEMATOLOGY ONCOLOGY | Facility: CLINIC | Age: 76
End: 2021-07-21
Payer: MEDICARE

## 2021-07-21 ENCOUNTER — APPOINTMENT (OUTPATIENT)
Dept: CARDIOLOGY | Facility: CLINIC | Age: 76
End: 2021-07-21
Payer: MEDICARE

## 2021-07-21 VITALS — HEART RATE: 76 BPM | RESPIRATION RATE: 18 BRPM | SYSTOLIC BLOOD PRESSURE: 120 MMHG | DIASTOLIC BLOOD PRESSURE: 84 MMHG

## 2021-07-21 VITALS — WEIGHT: 293 LBS | HEIGHT: 66 IN | BODY MASS INDEX: 47.09 KG/M2

## 2021-07-21 VITALS
HEART RATE: 82 BPM | DIASTOLIC BLOOD PRESSURE: 98 MMHG | SYSTOLIC BLOOD PRESSURE: 189 MMHG | HEIGHT: 64 IN | TEMPERATURE: 97.8 F | WEIGHT: 292 LBS | BODY MASS INDEX: 49.85 KG/M2

## 2021-07-21 VITALS — TEMPERATURE: 98 F | BODY MASS INDEX: 47.09 KG/M2 | WEIGHT: 293 LBS | HEIGHT: 66 IN

## 2021-07-21 DIAGNOSIS — Z86.711 PERSONAL HISTORY OF PULMONARY EMBOLISM: ICD-10-CM

## 2021-07-21 DIAGNOSIS — I89.0 LYMPHEDEMA, NOT ELSEWHERE CLASSIFIED: ICD-10-CM

## 2021-07-21 DIAGNOSIS — I26.99 OTHER PULMONARY EMBOLISM W/OUT ACUTE COR PULMONALE: ICD-10-CM

## 2021-07-21 DIAGNOSIS — J45.909 UNSPECIFIED ASTHMA, UNCOMPLICATED: ICD-10-CM

## 2021-07-21 PROCEDURE — 99213 OFFICE O/P EST LOW 20 MIN: CPT

## 2021-07-21 PROCEDURE — 99214 OFFICE O/P EST MOD 30 MIN: CPT

## 2021-07-21 PROCEDURE — 93000 ELECTROCARDIOGRAM COMPLETE: CPT

## 2021-07-24 ENCOUNTER — INPATIENT (INPATIENT)
Facility: HOSPITAL | Age: 76
LOS: 3 days | Discharge: HOME HEALTH | End: 2021-07-28
Attending: INTERNAL MEDICINE | Admitting: INTERNAL MEDICINE
Payer: MEDICARE

## 2021-07-24 VITALS
RESPIRATION RATE: 16 BRPM | SYSTOLIC BLOOD PRESSURE: 202 MMHG | HEIGHT: 66 IN | OXYGEN SATURATION: 100 % | WEIGHT: 293 LBS | HEART RATE: 98 BPM | DIASTOLIC BLOOD PRESSURE: 97 MMHG | TEMPERATURE: 100 F
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
## 2021-07-24 DIAGNOSIS — J45.909 UNSPECIFIED ASTHMA, UNCOMPLICATED: ICD-10-CM

## 2021-07-24 DIAGNOSIS — Z79.890 HORMONE REPLACEMENT THERAPY: ICD-10-CM

## 2021-07-24 DIAGNOSIS — Z98.890 OTHER SPECIFIED POSTPROCEDURAL STATES: Chronic | ICD-10-CM

## 2021-07-24 DIAGNOSIS — T40.605A ADVERSE EFFECT OF UNSPECIFIED NARCOTICS, INITIAL ENCOUNTER: ICD-10-CM

## 2021-07-24 DIAGNOSIS — Z86.711 PERSONAL HISTORY OF PULMONARY EMBOLISM: ICD-10-CM

## 2021-07-24 DIAGNOSIS — M17.0 BILATERAL PRIMARY OSTEOARTHRITIS OF KNEE: ICD-10-CM

## 2021-07-24 DIAGNOSIS — M54.5 LOW BACK PAIN: ICD-10-CM

## 2021-07-24 DIAGNOSIS — E66.01 MORBID (SEVERE) OBESITY DUE TO EXCESS CALORIES: ICD-10-CM

## 2021-07-24 DIAGNOSIS — I08.2 RHEUMATIC DISORDERS OF BOTH AORTIC AND TRICUSPID VALVES: ICD-10-CM

## 2021-07-24 DIAGNOSIS — M25.562 PAIN IN LEFT KNEE: ICD-10-CM

## 2021-07-24 DIAGNOSIS — Z79.82 LONG TERM (CURRENT) USE OF ASPIRIN: ICD-10-CM

## 2021-07-24 DIAGNOSIS — R42 DIZZINESS AND GIDDINESS: ICD-10-CM
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
## 2021-07-24 DIAGNOSIS — M25.561 PAIN IN RIGHT KNEE: ICD-10-CM

## 2021-07-24 DIAGNOSIS — K21.9 GASTRO-ESOPHAGEAL REFLUX DISEASE WITHOUT ESOPHAGITIS: ICD-10-CM

## 2021-07-24 DIAGNOSIS — I37.1 NONRHEUMATIC PULMONARY VALVE INSUFFICIENCY: ICD-10-CM

## 2021-07-24 DIAGNOSIS — I87.2 VENOUS INSUFFICIENCY (CHRONIC) (PERIPHERAL): ICD-10-CM

## 2021-07-24 DIAGNOSIS — G89.29 OTHER CHRONIC PAIN: ICD-10-CM

## 2021-07-24 DIAGNOSIS — I27.20 PULMONARY HYPERTENSION, UNSPECIFIED: ICD-10-CM

## 2021-07-24 DIAGNOSIS — E89.0 POSTPROCEDURAL HYPOTHYROIDISM: ICD-10-CM

## 2021-07-24 DIAGNOSIS — Z85.3 PERSONAL HISTORY OF MALIGNANT NEOPLASM OF BREAST: ICD-10-CM

## 2021-07-24 DIAGNOSIS — R77.8 OTHER SPECIFIED ABNORMALITIES OF PLASMA PROTEINS: ICD-10-CM

## 2021-07-24 DIAGNOSIS — I10 ESSENTIAL (PRIMARY) HYPERTENSION: ICD-10-CM

## 2021-07-24 LAB
ALBUMIN SERPL ELPH-MCNC: 4.3 G/DL — SIGNIFICANT CHANGE UP (ref 3.5–5.2)
ALP SERPL-CCNC: 77 U/L — SIGNIFICANT CHANGE UP (ref 30–115)
ALT FLD-CCNC: 22 U/L — SIGNIFICANT CHANGE UP (ref 0–41)
ANION GAP SERPL CALC-SCNC: 12 MMOL/L — SIGNIFICANT CHANGE UP (ref 7–14)
APPEARANCE UR: CLEAR — SIGNIFICANT CHANGE UP
APTT BLD: 35.1 SEC — SIGNIFICANT CHANGE UP (ref 27–39.2)
AST SERPL-CCNC: 33 U/L — SIGNIFICANT CHANGE UP (ref 0–41)
BASOPHILS # BLD AUTO: 0.04 K/UL — SIGNIFICANT CHANGE UP (ref 0–0.2)
BASOPHILS NFR BLD AUTO: 0.4 % — SIGNIFICANT CHANGE UP (ref 0–1)
BILIRUB SERPL-MCNC: 0.9 MG/DL — SIGNIFICANT CHANGE UP (ref 0.2–1.2)
BILIRUB UR-MCNC: NEGATIVE — SIGNIFICANT CHANGE UP
BUN SERPL-MCNC: 14 MG/DL — SIGNIFICANT CHANGE UP (ref 10–20)
CALCIUM SERPL-MCNC: 8.9 MG/DL — SIGNIFICANT CHANGE UP (ref 8.5–10.1)
CHLORIDE SERPL-SCNC: 98 MMOL/L — SIGNIFICANT CHANGE UP (ref 98–110)
CK SERPL-CCNC: 635 U/L — HIGH (ref 0–225)
CO2 SERPL-SCNC: 27 MMOL/L — SIGNIFICANT CHANGE UP (ref 17–32)
COLOR SPEC: YELLOW — SIGNIFICANT CHANGE UP
CREAT SERPL-MCNC: 0.7 MG/DL — SIGNIFICANT CHANGE UP (ref 0.7–1.5)
DIFF PNL FLD: NEGATIVE — SIGNIFICANT CHANGE UP
EOSINOPHIL # BLD AUTO: 0.03 K/UL — SIGNIFICANT CHANGE UP (ref 0–0.7)
EOSINOPHIL NFR BLD AUTO: 0.3 % — SIGNIFICANT CHANGE UP (ref 0–8)
ETHANOL SERPL-MCNC: <10 MG/DL — SIGNIFICANT CHANGE UP
GLUCOSE SERPL-MCNC: 104 MG/DL — HIGH (ref 70–99)
GLUCOSE UR QL: NEGATIVE — SIGNIFICANT CHANGE UP
HCT VFR BLD CALC: 40.6 % — SIGNIFICANT CHANGE UP (ref 37–47)
HGB BLD-MCNC: 13 G/DL — SIGNIFICANT CHANGE UP (ref 12–16)
IMM GRANULOCYTES NFR BLD AUTO: 0.5 % — HIGH (ref 0.1–0.3)
INR BLD: 1.16 RATIO — SIGNIFICANT CHANGE UP (ref 0.65–1.3)
KETONES UR-MCNC: ABNORMAL
LACTATE SERPL-SCNC: 1.2 MMOL/L — SIGNIFICANT CHANGE UP (ref 0.7–2)
LEUKOCYTE ESTERASE UR-ACNC: NEGATIVE — SIGNIFICANT CHANGE UP
LIDOCAIN IGE QN: 12 U/L — SIGNIFICANT CHANGE UP (ref 7–60)
LYMPHOCYTES # BLD AUTO: 0.82 K/UL — LOW (ref 1.2–3.4)
LYMPHOCYTES # BLD AUTO: 7.5 % — LOW (ref 20.5–51.1)
MCHC RBC-ENTMCNC: 30.2 PG — SIGNIFICANT CHANGE UP (ref 27–31)
MCHC RBC-ENTMCNC: 32 G/DL — SIGNIFICANT CHANGE UP (ref 32–37)
MCV RBC AUTO: 94.2 FL — SIGNIFICANT CHANGE UP (ref 81–99)
MONOCYTES # BLD AUTO: 0.59 K/UL — SIGNIFICANT CHANGE UP (ref 0.1–0.6)
MONOCYTES NFR BLD AUTO: 5.4 % — SIGNIFICANT CHANGE UP (ref 1.7–9.3)
NEUTROPHILS # BLD AUTO: 9.36 K/UL — HIGH (ref 1.4–6.5)
NEUTROPHILS NFR BLD AUTO: 85.9 % — HIGH (ref 42.2–75.2)
NITRITE UR-MCNC: NEGATIVE — SIGNIFICANT CHANGE UP
NRBC # BLD: 0 /100 WBCS — SIGNIFICANT CHANGE UP (ref 0–0)
PH UR: 6.5 — SIGNIFICANT CHANGE UP (ref 5–8)
PLATELET # BLD AUTO: 157 K/UL — SIGNIFICANT CHANGE UP (ref 130–400)
POTASSIUM SERPL-MCNC: 3.9 MMOL/L — SIGNIFICANT CHANGE UP (ref 3.5–5)
POTASSIUM SERPL-SCNC: 3.9 MMOL/L — SIGNIFICANT CHANGE UP (ref 3.5–5)
PROT SERPL-MCNC: 6.8 G/DL — SIGNIFICANT CHANGE UP (ref 6–8)
PROT UR-MCNC: SIGNIFICANT CHANGE UP
PROTHROM AB SERPL-ACNC: 13.3 SEC — HIGH (ref 9.95–12.87)
RBC # BLD: 4.31 M/UL — SIGNIFICANT CHANGE UP (ref 4.2–5.4)
RBC # FLD: 12.7 % — SIGNIFICANT CHANGE UP (ref 11.5–14.5)
SODIUM SERPL-SCNC: 137 MMOL/L — SIGNIFICANT CHANGE UP (ref 135–146)
SP GR SPEC: 1.03 — HIGH (ref 1.01–1.03)
TROPONIN T SERPL-MCNC: 0.02 NG/ML — HIGH
UROBILINOGEN FLD QL: SIGNIFICANT CHANGE UP
WBC # BLD: 10.89 K/UL — HIGH (ref 4.8–10.8)
WBC # FLD AUTO: 10.89 K/UL — HIGH (ref 4.8–10.8)

## 2021-07-24 PROCEDURE — 71260 CT THORAX DX C+: CPT | Mod: 26,MA

## 2021-07-24 PROCEDURE — 99285 EMERGENCY DEPT VISIT HI MDM: CPT

## 2021-07-24 PROCEDURE — 72170 X-RAY EXAM OF PELVIS: CPT | Mod: 26

## 2021-07-24 PROCEDURE — 93010 ELECTROCARDIOGRAM REPORT: CPT

## 2021-07-24 PROCEDURE — 71045 X-RAY EXAM CHEST 1 VIEW: CPT | Mod: 26

## 2021-07-24 PROCEDURE — 70450 CT HEAD/BRAIN W/O DYE: CPT | Mod: 26,MA

## 2021-07-24 PROCEDURE — 74177 CT ABD & PELVIS W/CONTRAST: CPT | Mod: 26,MA

## 2021-07-24 PROCEDURE — 72125 CT NECK SPINE W/O DYE: CPT | Mod: 26,MA

## 2021-07-24 RX ORDER — MORPHINE SULFATE 50 MG/1
2 CAPSULE, EXTENDED RELEASE ORAL ONCE
Refills: 0 | Status: DISCONTINUED | OUTPATIENT
Start: 2021-07-24 | End: 2021-07-25

## 2021-07-24 RX ORDER — SODIUM CHLORIDE 9 MG/ML
1000 INJECTION INTRAMUSCULAR; INTRAVENOUS; SUBCUTANEOUS ONCE
Refills: 0 | Status: COMPLETED | OUTPATIENT
Start: 2021-07-24 | End: 2021-07-24

## 2021-07-24 RX ADMIN — SODIUM CHLORIDE 1000 MILLILITER(S): 9 INJECTION INTRAMUSCULAR; INTRAVENOUS; SUBCUTANEOUS at 23:43

## 2021-07-24 RX ADMIN — SODIUM CHLORIDE 1000 MILLILITER(S): 9 INJECTION INTRAMUSCULAR; INTRAVENOUS; SUBCUTANEOUS at 21:18

## 2021-07-24 NOTE — ED ADULT NURSE NOTE - ASSOCIATED SYMPTOMS
pt was found on floor by pts daughter - pt doesn't recall events - per daughter pt was gropggy from pain meds and ambien

## 2021-07-24 NOTE — ED PROVIDER NOTE - PROGRESS NOTE DETAILS
ED Attending SUMMER [facility]  troponin  noted, pt with no chest pain, ekg with no ischemic changes, no syncope, no lh//dizziness, pt denies any pre syncope symptoms prior to fall, pending imaging results will continue to monitor and reassess. ED Attending Mariela, S  Pt and daughter aware of all results, agree with plan for admission as pt with increased rsik of fall lives at home along, as well as troponin noted, medical admitting team aware of pt and admission.

## 2021-07-24 NOTE — ED ADULT NURSE NOTE - CHIEF COMPLAINT QUOTE
Patient BIBA from home s/p fall while walking with walker sometime between last night and this morning. Patient denies LOC or any head trauma and states she was unable to get up due to chronic back and knee pain. Patient was found sitting on floor this afternoon by her daughter. Patient takes oxycodone 10mg Q6H daily, pupils pinpoint in triage. Cleared for main for MD Gaston.

## 2021-07-24 NOTE — ED PROVIDER NOTE - CLINICAL SUMMARY MEDICAL DECISION MAKING FREE TEXT BOX
pt presented s/p fall, ekg with no adam or depressions, troponin noted, pt lives at home alone, increased risk of fall, on opioids which contributes to her drowsiness, as well as other meds, daughter at bedside with pt, aware of plan for admission and agree, medical admitting team aware of pt and admission.

## 2021-07-24 NOTE — ED ADULT TRIAGE NOTE - CHIEF COMPLAINT QUOTE
Patient BIBA from home s/p fall while walking with walker sometime between last night and this morning. Patient denies LOC or any head trauma and states she was unable to get up due to chronic back and knee pain. Patient was found sitting on floor this afternoon by her daughter. Patient takes oxycodone 10mg Q6H daily, pupils pinpoint in triage. Patient BIBA from home s/p fall while walking with walker sometime between last night and this morning. Patient denies LOC or any head trauma and states she was unable to get up due to chronic back and knee pain. Patient was found sitting on floor this afternoon by her daughter. Patient takes oxycodone 10mg Q6H daily, pupils pinpoint in triage. Cleared for main for MD Gaston.

## 2021-07-24 NOTE — ED PROVIDER NOTE - CARE PLAN
Assessment and plan of treatment:	Plan: EKG, CXR, pelvic xray, pan scan, labs urine, reassess.   Principal Discharge DX:	Fall  Assessment and plan of treatment:	Plan: EKG, CXR, pelvic xray, pan scan, labs urine, reassess.  Secondary Diagnosis:	Elevated troponin

## 2021-07-24 NOTE — ED PROVIDER NOTE - ATTENDING CONTRIBUTION TO CARE
77 y/o f/ pmhx of cbp on percocet 10 mg four times a day (starts taking it a 7am) - follows with pain management (next appoint next week), asthma, breast cancer in remission, RAYMOND, PVD, lives in one floor home alone, presents s/p fall. daughter at bedside and reports that she usually leaves pt at 11pm and returns the next day at 2pm to check on pt as she is usually sleeping until about 1-2 pm every day. yesterday pt took her percocet 10 ms and ambient at midnight after daughter left at 11pm, when she returned today found pt in between door laying on floor sleeping. pt reports she did not hit her head. she went to walk to get a snack last night not sure exactly what time but was after daughter left, and tripped and fell on buttock, no head trauma or loc. not on AC. unwitnessed. daughter reports her and her  tried to get her off floor but due to generalized pain pt is always in gave her 15 mf of percocet to help.  Pt denies any fever, chills, n/v, cp,  pleuritic cp, sob, palpitations, diaphoresis, cough, chest wall/rib pain, clavicular pain, ankle pain,  shoulder pain, wrist pain,  no hip pain, no ha/lh/dizziness, numbness/tingling, neck pain/ stiffness, abd pain,  melena/brbpr, urinary symptoms, edema, calf pain/swelling/erythema, sick contacts, recent travel . GCS 15. Pt denies taking her morning dose of percocet- only took the 15 mg that her daughter gave her, no concern for OD.     On Exam:  Vital Signs: I have reviewed the initial vital signs.  Constitutional: pt laying on stretcher intermittently falls asleep- but awaken to name calling easily. daughter reports baseline due to percocet she gets.   HEAD: No signs of basilar skull fracture.  Integumentary: No rash. No laceration, abrasions, or swelling. No signs of acute trauma on external exam.   EYES: No periorbital swelling/ecchymoses. Pinpoint pupils, EOM intact. No nystagmus. No subconjunctival hemorrhage.   ENT: MMM. No rhinorrhea/otorrhea. No epistaxis,  No septal hematoma. No mastoid ecchymoses. No intraoral bleeding, No loose or cracked teeth, no active bleeding. No malocclusion. No TMJ pain.  NECK: Supple, non-tender, No palpable shelves or step-offs.  BACK: No spinous tenderness. No palpable shelves or step-offs.  Cardiovascular: RRR, radial pulses 2/4 b/l. dp and pt pulses 2/4/ b/l. No pain to palpation to chest wall.  Respiratory: BS present b/l, ctabl, no wheezing or crackles, no stridor. No pain to palpation to ribs b/l. No crepitus. No subq emphysema.   Gastrointestinal: BS present throughout all 4 quadrants, soft, nd, nt. no r/g.  Musculoskeletal: FROM of all extremities at her baseline- pt has hard time bending both knees but this is baseline due to OA and body habitus.. No bony tenderness. No pain to palpation to clavicles, no obvious bony deformities. No hip pain. No short leg. No internal or external rotation of LE  Neurologic: GCS 15. CN II-XII intact, no facial droop or slurring of speech. Motor 5/5 and sensation intact throughout upper and lower extremities. (-) Pronator. NIHSS0.

## 2021-07-24 NOTE — ED PROVIDER NOTE - OBJECTIVE STATEMENT
75 yo female with a pmh of HTN, PE, asthma, arthritis and breast CA presents for a fall. last night pt was given her prescribed percocet and ambien then went to get a snack and fell onto her buttock. pt denies any head trauma/LOC but was unable to get herself off the floor. pt c/o bilateral knee pain and lower back pain that was present prior to the fall but now worsened. pt denies any other symptoms including fevers, chill, headache, recent illness/travel, cough, abdominal pain, chest pain, or SOB.

## 2021-07-24 NOTE — ED PROVIDER NOTE - PMH
Asthma    Breast cancer  LEFT  HTN (hypertension)    OA (osteoarthritis)    Pulmonary embolism  reports resolved and not on AC  PVD (peripheral vascular disease)

## 2021-07-25 LAB
ALBUMIN SERPL ELPH-MCNC: 3.7 G/DL — SIGNIFICANT CHANGE UP (ref 3.5–5.2)
ALP SERPL-CCNC: 69 U/L — SIGNIFICANT CHANGE UP (ref 30–115)
ALT FLD-CCNC: 18 U/L — SIGNIFICANT CHANGE UP (ref 0–41)
ANION GAP SERPL CALC-SCNC: 14 MMOL/L — SIGNIFICANT CHANGE UP (ref 7–14)
APTT BLD: 37.8 SEC — SIGNIFICANT CHANGE UP (ref 27–39.2)
AST SERPL-CCNC: 28 U/L — SIGNIFICANT CHANGE UP (ref 0–41)
BILIRUB SERPL-MCNC: 1 MG/DL — SIGNIFICANT CHANGE UP (ref 0.2–1.2)
BUN SERPL-MCNC: 11 MG/DL — SIGNIFICANT CHANGE UP (ref 10–20)
CALCIUM SERPL-MCNC: 8.4 MG/DL — LOW (ref 8.5–10.1)
CHLORIDE SERPL-SCNC: 103 MMOL/L — SIGNIFICANT CHANGE UP (ref 98–110)
CHOLEST SERPL-MCNC: 182 MG/DL — SIGNIFICANT CHANGE UP
CK MB CFR SERPL CALC: 1.8 NG/ML — SIGNIFICANT CHANGE UP (ref 0.6–6.3)
CK SERPL-CCNC: 396 U/L — HIGH (ref 0–225)
CO2 SERPL-SCNC: 22 MMOL/L — SIGNIFICANT CHANGE UP (ref 17–32)
CREAT SERPL-MCNC: 0.6 MG/DL — LOW (ref 0.7–1.5)
D DIMER BLD IA.RAPID-MCNC: 3292 NG/ML DDU — HIGH (ref 0–230)
GLUCOSE SERPL-MCNC: 83 MG/DL — SIGNIFICANT CHANGE UP (ref 70–99)
HCT VFR BLD CALC: 37.2 % — SIGNIFICANT CHANGE UP (ref 37–47)
HDLC SERPL-MCNC: 66 MG/DL — SIGNIFICANT CHANGE UP
HGB BLD-MCNC: 12.2 G/DL — SIGNIFICANT CHANGE UP (ref 12–16)
INR BLD: 1.13 RATIO — SIGNIFICANT CHANGE UP (ref 0.65–1.3)
LIPID PNL WITH DIRECT LDL SERPL: 104 MG/DL — HIGH
MAGNESIUM SERPL-MCNC: 1.9 MG/DL — SIGNIFICANT CHANGE UP (ref 1.8–2.4)
MCHC RBC-ENTMCNC: 30.7 PG — SIGNIFICANT CHANGE UP (ref 27–31)
MCHC RBC-ENTMCNC: 32.8 G/DL — SIGNIFICANT CHANGE UP (ref 32–37)
MCV RBC AUTO: 93.5 FL — SIGNIFICANT CHANGE UP (ref 81–99)
NON HDL CHOLESTEROL: 116 MG/DL — SIGNIFICANT CHANGE UP
NRBC # BLD: 0 /100 WBCS — SIGNIFICANT CHANGE UP (ref 0–0)
PLATELET # BLD AUTO: 130 K/UL — SIGNIFICANT CHANGE UP (ref 130–400)
POTASSIUM SERPL-MCNC: 4.3 MMOL/L — SIGNIFICANT CHANGE UP (ref 3.5–5)
POTASSIUM SERPL-SCNC: 4.3 MMOL/L — SIGNIFICANT CHANGE UP (ref 3.5–5)
PROT SERPL-MCNC: 5.9 G/DL — LOW (ref 6–8)
PROTHROM AB SERPL-ACNC: 13 SEC — HIGH (ref 9.95–12.87)
RBC # BLD: 3.98 M/UL — LOW (ref 4.2–5.4)
RBC # FLD: 12.6 % — SIGNIFICANT CHANGE UP (ref 11.5–14.5)
SARS-COV-2 RNA SPEC QL NAA+PROBE: SIGNIFICANT CHANGE UP
SODIUM SERPL-SCNC: 139 MMOL/L — SIGNIFICANT CHANGE UP (ref 135–146)
TRIGL SERPL-MCNC: 75 MG/DL — SIGNIFICANT CHANGE UP
TROPONIN T SERPL-MCNC: 0.02 NG/ML — HIGH
WBC # BLD: 9.24 K/UL — SIGNIFICANT CHANGE UP (ref 4.8–10.8)
WBC # FLD AUTO: 9.24 K/UL — SIGNIFICANT CHANGE UP (ref 4.8–10.8)

## 2021-07-25 PROCEDURE — 99221 1ST HOSP IP/OBS SF/LOW 40: CPT | Mod: GC,AI

## 2021-07-25 PROCEDURE — 93306 TTE W/DOPPLER COMPLETE: CPT | Mod: 26

## 2021-07-25 PROCEDURE — 93970 EXTREMITY STUDY: CPT | Mod: 26

## 2021-07-25 RX ORDER — MONTELUKAST 4 MG/1
1 TABLET, CHEWABLE ORAL
Qty: 0 | Refills: 0 | DISCHARGE

## 2021-07-25 RX ORDER — LOSARTAN POTASSIUM 100 MG/1
25 TABLET, FILM COATED ORAL DAILY
Refills: 0 | Status: DISCONTINUED | OUTPATIENT
Start: 2021-07-25 | End: 2021-07-28

## 2021-07-25 RX ORDER — LEVOTHYROXINE SODIUM 125 MCG
200 TABLET ORAL DAILY
Refills: 0 | Status: DISCONTINUED | OUTPATIENT
Start: 2021-07-25 | End: 2021-07-28

## 2021-07-25 RX ORDER — ESCITALOPRAM OXALATE 10 MG/1
1 TABLET, FILM COATED ORAL
Qty: 0 | Refills: 0 | DISCHARGE

## 2021-07-25 RX ORDER — CHLORHEXIDINE GLUCONATE 213 G/1000ML
1 SOLUTION TOPICAL
Refills: 0 | Status: DISCONTINUED | OUTPATIENT
Start: 2021-07-25 | End: 2021-07-28

## 2021-07-25 RX ORDER — ENOXAPARIN SODIUM 100 MG/ML
40 INJECTION SUBCUTANEOUS DAILY
Refills: 0 | Status: DISCONTINUED | OUTPATIENT
Start: 2021-07-25 | End: 2021-07-25

## 2021-07-25 RX ORDER — ZOLPIDEM TARTRATE 10 MG/1
1 TABLET ORAL
Qty: 0 | Refills: 0 | DISCHARGE

## 2021-07-25 RX ORDER — PANTOPRAZOLE SODIUM 20 MG/1
40 TABLET, DELAYED RELEASE ORAL
Refills: 0 | Status: DISCONTINUED | OUTPATIENT
Start: 2021-07-25 | End: 2021-07-28

## 2021-07-25 RX ORDER — ALPRAZOLAM 0.25 MG
1 TABLET ORAL
Qty: 0 | Refills: 0 | DISCHARGE

## 2021-07-25 RX ORDER — MONTELUKAST 4 MG/1
10 TABLET, CHEWABLE ORAL DAILY
Refills: 0 | Status: DISCONTINUED | OUTPATIENT
Start: 2021-07-25 | End: 2021-07-28

## 2021-07-25 RX ORDER — BUDESONIDE AND FORMOTEROL FUMARATE DIHYDRATE 160; 4.5 UG/1; UG/1
2 AEROSOL RESPIRATORY (INHALATION)
Refills: 0 | Status: DISCONTINUED | OUTPATIENT
Start: 2021-07-25 | End: 2021-07-28

## 2021-07-25 RX ORDER — OXYCODONE AND ACETAMINOPHEN 5; 325 MG/1; MG/1
1 TABLET ORAL EVERY 6 HOURS
Refills: 0 | Status: DISCONTINUED | OUTPATIENT
Start: 2021-07-25 | End: 2021-07-27

## 2021-07-25 RX ORDER — ACETAMINOPHEN 500 MG
650 TABLET ORAL EVERY 6 HOURS
Refills: 0 | Status: DISCONTINUED | OUTPATIENT
Start: 2021-07-25 | End: 2021-07-27

## 2021-07-25 RX ORDER — LEVOTHYROXINE SODIUM 125 MCG
1 TABLET ORAL
Qty: 0 | Refills: 0 | DISCHARGE

## 2021-07-25 RX ORDER — CALCITRIOL 0.5 UG/1
1 CAPSULE ORAL
Qty: 0 | Refills: 0 | DISCHARGE

## 2021-07-25 RX ORDER — ASPIRIN/CALCIUM CARB/MAGNESIUM 324 MG
81 TABLET ORAL DAILY
Refills: 0 | Status: DISCONTINUED | OUTPATIENT
Start: 2021-07-25 | End: 2021-07-25

## 2021-07-25 RX ORDER — ENOXAPARIN SODIUM 100 MG/ML
40 INJECTION SUBCUTANEOUS
Refills: 0 | Status: DISCONTINUED | OUTPATIENT
Start: 2021-07-25 | End: 2021-07-28

## 2021-07-25 RX ORDER — ASPIRIN/CALCIUM CARB/MAGNESIUM 324 MG
1 TABLET ORAL
Qty: 0 | Refills: 0 | DISCHARGE

## 2021-07-25 RX ORDER — ATORVASTATIN CALCIUM 80 MG/1
1 TABLET, FILM COATED ORAL
Qty: 0 | Refills: 0 | DISCHARGE

## 2021-07-25 RX ORDER — BUDESONIDE AND FORMOTEROL FUMARATE DIHYDRATE 160; 4.5 UG/1; UG/1
2 AEROSOL RESPIRATORY (INHALATION)
Qty: 0 | Refills: 0 | DISCHARGE

## 2021-07-25 RX ORDER — ASPIRIN/CALCIUM CARB/MAGNESIUM 324 MG
81 TABLET ORAL DAILY
Refills: 0 | Status: DISCONTINUED | OUTPATIENT
Start: 2021-07-25 | End: 2021-07-28

## 2021-07-25 RX ADMIN — ENOXAPARIN SODIUM 40 MILLIGRAM(S): 100 INJECTION SUBCUTANEOUS at 16:40

## 2021-07-25 RX ADMIN — MONTELUKAST 10 MILLIGRAM(S): 4 TABLET, CHEWABLE ORAL at 11:43

## 2021-07-25 RX ADMIN — LOSARTAN POTASSIUM 25 MILLIGRAM(S): 100 TABLET, FILM COATED ORAL at 05:33

## 2021-07-25 RX ADMIN — Medication 81 MILLIGRAM(S): at 11:40

## 2021-07-25 RX ADMIN — CHLORHEXIDINE GLUCONATE 1 APPLICATION(S): 213 SOLUTION TOPICAL at 05:35

## 2021-07-25 RX ADMIN — ENOXAPARIN SODIUM 40 MILLIGRAM(S): 100 INJECTION SUBCUTANEOUS at 05:33

## 2021-07-25 RX ADMIN — MORPHINE SULFATE 2 MILLIGRAM(S): 50 CAPSULE, EXTENDED RELEASE ORAL at 01:49

## 2021-07-25 RX ADMIN — BUDESONIDE AND FORMOTEROL FUMARATE DIHYDRATE 2 PUFF(S): 160; 4.5 AEROSOL RESPIRATORY (INHALATION) at 11:44

## 2021-07-25 RX ADMIN — PANTOPRAZOLE SODIUM 40 MILLIGRAM(S): 20 TABLET, DELAYED RELEASE ORAL at 05:34

## 2021-07-25 RX ADMIN — Medication 200 MICROGRAM(S): at 05:34

## 2021-07-25 RX ADMIN — OXYCODONE AND ACETAMINOPHEN 1 TABLET(S): 5; 325 TABLET ORAL at 13:50

## 2021-07-25 NOTE — H&P ADULT - ASSESSMENT
Assessment and Plan  Case of a 76 year old female patient with HTN, DL, OA, Asthma, Hypothyroidism, history of breast cancer, and PE who was brought to the ED on 07/25 following a fall after a dose of percocet and ambien, found to have negative trauma workup, admitted to telemetry for monitoring and for placement. Currently hemodynamically stable.      Likely Mechanical Fall in Setting of Percocet and Ambien Doses  * No history of recurrent falls  *   * Troponin 0.02 x1   * Serum Alcohol <10  * XR pelvis (07/25) no pelvic fracture  * CT H without contrast (07/25) no acute intracranial process  * CT C-spine wo contrast (07/25) no fracture or subluxation  * CT CAP IC (07/25) no acute intra thoracic or abdominal process, left suprahilar patchy groundglass opacities suggestive of inflammation  * ECG in ED noted    - Avoid sedatives unless truly needed  - Follow up orthostatic vital signs to rule out orthostatic hypotension  - Monitor Telemetry on 3C  - Follow up TTE once performed. Last TTE 04/29/19 trace NH, mod AR, mild-mod TR  - Trend cardiac enzymes: troponin 0.02 07/25 -> follow up repeat troponin and CK + CKMB 07/25 AM  - No need for EEG since seizure is low on differential  - Physical Therapy/ Rehab consulted      Left Suprahilar Patchy Ground Glass Opacity   * Likely inflammatory  * No fever  * No leukocytosis  * On room air with SaO2 100%  - In absence of WBC, fevers, or oxygen requirements, infection is less likely  - Follow up chest x ray in morning    Hypertension  * Home medication Losartan 25mg QD  * ED Vital Signs: /97 mmHg    - Monitor BP closely  - Continue Losartan 25mg QD      History of Subsegmental PE at bifurcation of posterior and lateral subsegment of RLL   * Subsegmental PE at bifurcation of posterior and lateral subsegment of RLL on 04/27/2019.   * Currently off anticoagulation (was on eliquis 5mg BID)  - Will start Lovenox 40mg QD for DVT Prophylaxis  - Monitor SaO2: currently on RA with SaO2 100%  - No need for duplex or D-dimer for now      History of Left Sided Ductal Breast Carcinoma   * s/p left sided Lumpectomy  * Home medication Oxycodone 10mg every 6 hours  - Outpatient follow up as needed  - Avoid medications that would predispose to falls      Asthma  * Controlled  * Home med Montelukast 10mg QD, Symbicort 160-4.5 2 puffs BID , Epi pen  - Continue Montelukast 10,g QD and Symbicort      Bilateral Knee Osteoarthritis  - Monitor pain and keep score at 01/10  - Start Tylenol 650mg Q6h PRN for pain control  - Physical Therapy/ Rehab       Dyslipidemia  * Last Lipid Profile 05/09/2019 Chol 160, LDL 99, HDL 60, TG 86.   * Home medication Lipitor 40mg QD  - Follow up lipid profile   - Continue lipitor 40mg QD      Hypothyroidism  * Last TSH 1.95 05/09/2019  * Home medication Levothyroxine 25 mcg QD  - Follow up TSH 07/25  - Continue Levothyroxine 25 mcg QD      Others  - DVT Prophylaxis: Lovenox 40mg Subcutaneously daily  - GI Prophylaxis: Pantoprazole 40mg PO QD  - IV Hydration with D5 0.45 NSS at a rate of 20cc/hour   - Diet: Regular  - Code Status: Full    Barriers to learning: YES/NO  Discharge Planning: Patient will be discharged once stable and  Plan was communicated with   Education given to:   Educated about:       Al Snyder MD  PGY - 1 Internal Medicine      Assessment and Plan  Case of a 76 year old female patient with HTN, DL, OA, Asthma, Hypothyroidism, history of breast cancer, and PE who was brought to the ED on 07/25 following a fall after a dose of percocet and ambien, found to have negative trauma workup, admitted to telemetry for monitoring and for placement. Currently hemodynamically stable.      Likely Mechanical Fall in Setting of Percocet and Ambien Doses  * No history of recurrent falls  *   * Troponin 0.02 x1   * Serum Alcohol <10  * XR pelvis (07/25) no pelvic fracture  * CT H without contrast (07/25) no acute intracranial process  * CT C-spine wo contrast (07/25) no fracture or subluxation  * CT CAP IC (07/25) no acute intra thoracic or abdominal process, left suprahilar patchy groundglass opacities suggestive of inflammation  * ECG in ED noted    - Avoid sedatives unless truly needed  - Follow up orthostatic vital signs to rule out orthostatic hypotension  - Monitor Telemetry on 3C  - Follow up TTE once performed. Last TTE 04/29/19 trace ND, mod AR, mild-mod TR  - Trend cardiac enzymes: troponin 0.02 07/25 -> follow up repeat troponin and CK + CKMB 07/25 AM  - No need for EEG since seizure is low on differential  - Physical Therapy/ Rehab consulted      History of Subsegmental PE at bifurcation of posterior and lateral subsegment of RLL   Stasis Dermatitis of Lower Extremities  * Subsegmental PE at bifurcation of posterior and lateral subsegment of RLL on 04/27/2019.   * Currently off anticoagulation (was on eliquis 5mg BID)  * Last TTE 04/29/19 trace ND, mod AR, mild-mod TR  * On room air with SaO2 100%    - Will start Lovenox 40mg QD for DVT Prophylaxis  - Monitor SaO2: currently on RA with SaO2 100%  - Follow up venous duplex of LE  - Follow up D-dimer 07/25 AM      Left Suprahilar Patchy Ground Glass Opacity   * Likely inflammatory  * No fever  * No leukocytosis  * On room air with SaO2 100%  - In absence of WBC, fevers, or oxygen requirements, infection is less likely  - Follow up chest x ray in morning      Hypertension  * Home medication Losartan 25mg QD  * ED Vital Signs: /97 mmHg    - Monitor BP closely  - Continue Losartan 25mg QD      History of Left Sided Ductal Breast Carcinoma   * s/p left sided Lumpectomy  * Home medication Oxycodone 10mg every 6 hours  - Outpatient follow up as needed      Asthma  * Controlled  * Home med Montelukast 10mg QD, Symbicort 160-4.5 2 puffs BID , Epi pen  - Continue Montelukast 10,g QD and Symbicort      Bilateral Knee Osteoarthritis  * Home med Oxycodone 10mg Q6h  - Monitor pain and keep score at 01/10  - Start Tylenol 650mg Q6h PRN for pain control   - Physical Therapy/ Rehab       Dyslipidemia  * Last Lipid Profile 05/09/2019 Chol 160, LDL 99, HDL 60, TG 86.   * Home medication Lipitor 40mg QD  - Follow up lipid profile   - Continue lipitor 40mg QD      Hypothyroidism  * Last TSH 1.95 05/09/2019  * Home medication Levothyroxine 25 mcg QD  - Follow up TSH 07/25  - Continue Levothyroxine 25 mcg QD      Others  - DVT Prophylaxis: Lovenox 40mg Subcutaneously daily  - GI Prophylaxis: Pantoprazole 40mg PO QD  - IV Hydration with D5 0.45 NSS at a rate of 20cc/hour   - Diet: Regular  - Code Status: Full    Barriers to learning: YES/NO  Discharge Planning: Patient will be discharged once stable and  Plan was communicated with   Education given to:   Educated about:       Al Snyder MD  PGY - 1 Internal Medicine   [facility]   Assessment and Plan  Case of a 76 year old female patient with HTN, DL, OA, Asthma, Hypothyroidism, history of breast cancer, and PE who was brought to the ED on 07/25 following a fall after a dose of percocet and ambien, found to have negative trauma workup, admitted to telemetry for monitoring and for placement. Currently hemodynamically stable.      Likely Mechanical Fall in Setting of Percocet and Ambien Doses  * No history of recurrent falls  *   * Troponin 0.02 x1   * Serum Alcohol <10  * XR pelvis (07/25) no pelvic fracture  * CT H without contrast (07/25) no acute intracranial process  * CT C-spine wo contrast (07/25) no fracture or subluxation  * CT CAP IC (07/25) no acute intra thoracic or abdominal process, left suprahilar patchy groundglass opacities suggestive of inflammation  * ECG in ED noted    - Avoid sedatives unless truly needed  - Follow up orthostatic vital signs to rule out orthostatic hypotension  - Monitor Telemetry on 3C  - Follow up TTE once performed. Last TTE 04/29/19 trace MO, mod AR, mild-mod TR  - Trend cardiac enzymes: troponin 0.02 07/25 -> follow up repeat troponin and CK + CKMB 07/25 AM  - No need for EEG since seizure is low on differential  - Physical Therapy/ OT consulted      History of Subsegmental PE at bifurcation of posterior and lateral subsegment of RLL (2019)  Venous Insufficiency and Stasis Dermatitis of Lower Extremities  * Pulmonologist Dr Mimi Hickey  * Subsegmental PE at bifurcation of posterior and lateral subsegment of RLL on 04/27/2019.   * Currently off anticoagulation (was on eliquis 5mg BID until end of 2019)  * Last TTE 04/29/19 trace MO, mod AR, mild-mod TR  * On room air with SaO2 100%    - Will start Lovenox 40mg QD for DVT Prophylaxis (was on eliquis 5mg BID until end of 2019)  - Monitor SaO2: currently on RA with SaO2 100%  - Follow up venous duplex of LE  - Follow up D-dimer 07/25 AM  - Outpatient follow up with Dr Mcgregor       Left Suprahilar Patchy Ground Glass Opacity   * Likely inflammatory  * No fever  * No leukocytosis  * On room air with SaO2 100%  - In absence of WBC, fevers, or oxygen requirements, infection is less likely  - Follow up chest x ray in morning      Hypertension  * Home medication Losartan 25mg QD  * ED Vital Signs: /97 mmHg    - Monitor BP closely  - Continue Losartan 25mg QD      History of Left Sided Ductal Breast Carcinoma   * s/p left sided Lumpectomy  * Follows with Dr Flores    - Outpatient follow up with Dr Flores as needed      Asthma  * Controlled  * Home med Montelukast 10mg QD, Symbicort 160-4.5 2 puffs BID , Epi pen    - Continue Montelukast 10,g QD and Symbicort  - Outpatient follow up with Dr Euceda as needed      Bilateral Knee Osteoarthritis and Back  * Pain Specialist Dr Thomason s/p epidural with no improvement.   * Home medication Oxycodone 10mg every 6 hours (additional 5 allowed in between)    - Monitor pain and keep score at 01/10  - Start Tylenol 650mg Q6h PRN for pain control   - Consider pain team consult in case of no improvement in pain. There was a plan for nerve block in case of persistence of pain  - Physical Therapy/ OT consulted      Hypothyroidism  * s/p Thyroidectomy in 2017  * Last TSH 1.95 05/09/2019  * Home medication Levothyroxine 200 mcg QD  - Follow up TSH 07/25  - Continue Levothyroxine 200 mcg QD      Others  - DVT Prophylaxis: Lovenox 40mg Subcutaneously daily  - GI Prophylaxis: Pantoprazole 40mg PO QD  - Diet: DASH/TLC  - Code Status: Full      Barriers to learning: NO  Discharge Planning: Patient will be discharged once stable   Plan was communicated with patient and medical team      Al Snyder MD  PGY - 1 Internal Medicine   [facility]   Assessment and Plan  Case of a 76 year old female patient with HTN, DL, OA, Asthma, Hypothyroidism, history of breast cancer, and PE who was brought to the ED on 07/25 following a fall after a dose of percocet and ambien, found to have negative trauma workup, admitted to telemetry for monitoring and for placement. Currently hemodynamically stable.      Likely Mechanical Fall in Setting of Percocet and Ambien Doses  * No history of recurrent falls  *   * Troponin 0.02 x1   * Serum Alcohol <10  * XR pelvis (07/25) no pelvic fracture  * CT H without contrast (07/25) no acute intracranial process  * CT C-spine wo contrast (07/25) no fracture or subluxation  * CT CAP IC (07/25) no acute intra thoracic or abdominal process, left suprahilar patchy groundglass opacities suggestive of inflammation  * ECG in ED noted    - Avoid sedatives unless truly needed  - Follow up orthostatic vital signs to rule out orthostatic hypotension  - Monitor Telemetry on 3C  - Follow up TTE once performed. Last TTE 04/29/19 trace NV, mod AR, mild-mod TR  - Trend cardiac enzymes: troponin 0.02 07/25 -> follow up repeat troponin and CK + CKMB 07/25 AM  - No need for EEG since seizure is low on differential  - Physical Therapy/ OT consulted      History of Subsegmental PE at bifurcation of posterior and lateral subsegment of RLL (2019)  Venous Insufficiency and Stasis Dermatitis of Lower Extremities  * Pulmonologist Dr Mimi Hickey  * Subsegmental PE at bifurcation of posterior and lateral subsegment of RLL on 04/27/2019.   * Currently off anticoagulation (was on eliquis 5mg BID until end of 2019)  * Last TTE 04/29/19 trace NV, mod AR, mild-mod TR  * On room air with SaO2 100%    - Will start Lovenox 40mg QD for DVT Prophylaxis (was on eliquis 5mg BID until end of 2019)  - Monitor SaO2: currently on RA with SaO2 100%  - Follow up venous duplex of LE  - Follow up D-dimer 07/25 AM  - For stasis dermatitis, apply Silvadene gel twice daily  - Outpatient follow up with Dr Mcgregor       Left Suprahilar Patchy Ground Glass Opacity   * Likely inflammatory  * No fever  * No leukocytosis  * On room air with SaO2 100%  - In absence of WBC, fevers, or oxygen requirements, infection is less likely  - Follow up chest x ray in morning      Hypertension  * Home medication Losartan 25mg QD  * ED Vital Signs: /97 mmHg    - Monitor BP closely  - Continue Losartan 25mg QD      History of Left Sided Ductal Breast Carcinoma   * s/p left sided Lumpectomy  * Follows with Dr Flores    - Outpatient follow up with Dr Flores as needed      Asthma  * Controlled  * Home med Montelukast 10mg QD, Symbicort 160-4.5 2 puffs BID , Epi pen    - Continue Montelukast 10,g QD and Symbicort  - Outpatient follow up with Dr Euceda as needed      Bilateral Knee Osteoarthritis and Back  * Pain Specialist Dr Thomason s/p epidural with no improvement.   * Home medication Oxycodone 10mg every 6 hours (additional 5 allowed in between)    - Monitor pain and keep score at 01/10  - Start Tylenol 650mg Q6h PRN for pain control   - Consider pain team consult in case of no improvement in pain. There was a plan for nerve block in case of persistence of pain  - Physical Therapy/ OT consulted      Hypothyroidism  * s/p Thyroidectomy in 2017  * Last TSH 1.95 05/09/2019  * Home medication Levothyroxine 200 mcg QD  - Follow up TSH 07/25  - Continue Levothyroxine 200 mcg QD      Others  - DVT Prophylaxis: Lovenox 40mg Subcutaneously daily  - GI Prophylaxis: Pantoprazole 40mg PO QD  - Diet: DASH/TLC  - Code Status: Full      Barriers to learning: NO  Discharge Planning: Patient will be discharged once stable   Plan was communicated with patient and medical team      Al Snyder MD  PGY - 1 Internal Medicine   [facility]

## 2021-07-25 NOTE — PATIENT PROFILE ADULT - NSPROMEDSBROUGHTTOHOSP_GEN_A_NUR
no
Please follow up with your primary care provider for further concerns you may have regarding your general health. Attached you will find your results from today's visit. Continue taking your medications as prescribed and keep your upcoming medical appointments.    Use mederma scar gel over your laceration once it heals.   Have your remaining sutures taken out in 5-7 days.   Your steri strips will fall off on their own.   Read the attached handout on suture strip care.

## 2021-07-25 NOTE — H&P ADULT - NSHPPHYSICALEXAM_GEN_ALL_CORE
Physical Exam  * General Appearance: Alert, cooperative, interactive, oriented to time, place, and person, in no acute distress  * Head: Normocephalic, without obvious abnormality, atraumatic  * Eyes: PERRL, conjunctiva/corneas clear, EOM's intact, fundi benign, both eyes  * Ears: Normal TM's and external ear canals bilaterally  * Neck: Supple, symmetrical, trachea midline, no adenopathy;   * Thyroid:  No enlargement/tenderness/nodules; no carotid bruit or JVD  * Lungs: Respirations unlabored, Good bilateral air entry, normal breath sounds (Clear to auscultation bilaterally, no audible wheezes, crackles, or rhonchi)  * Heart: Regular Rate and Rhythm, normal S1 and S2, no audible murmur, rub, or gallop  * Abdomen: Symmetric, non-distended, no scar, soft, non-tender, bowel sounds active all four quadrants, no masses, no organomegaly (no hepatosplenomegaly)  * Extremities: Extremities normal, atraumatic, no cyanosis, no lower extremity pitting edema bilaterally, adequate dorsalis pedis pulses  * Pulses: 2+ and symmetric all extremities  * Skin: Skin color, texture, turgor normal, no rashes or lesions  * Lymph nodes: Cervical, supraclavicular, and axillary nodes normal  * Neurologic: CNII-XII intact, normal strength, sensation and reflexes throughout Physical Exam  * General Appearance: Alert, cooperative, interactive, oriented to time, place, and person  * Head: Normocephalic, without obvious abnormality, atraumatic  * Thyroid:  No enlargement/tenderness/nodules; no carotid bruit or JVD  * Lungs: Respirations unlabored, Good bilateral air entry, normal breath sounds (Clear to auscultation bilaterally, no audible wheezes, crackles, or rhonchi)  * Heart: Regular Rate and Rhythm, normal S1 and S2, no audible murmur, rub, or gallop  * Abdomen: Symmetric, non-distended, no scar, soft, non-tender, bowel sounds active all four quadrants, no masses, no organomegaly (no hepatosplenomegaly)  * Extremities: Extremities swollen non pitting, stasis dermatitis, no cyanosis, adequate dorsalis pedis pulses  * Pulses: 2+ and symmetric all extremities  * Lymph nodes: Cervical, supraclavicular, and axillary nodes normal  * Neurologic: CNII-XII intact, normal strength, sensation and reflexes throughout Physical Exam  * General Appearance: Alert, cooperative, interactive, oriented to time, place, and person  * Head: Normocephalic, without obvious abnormality, atraumatic  * Thyroid:  No enlargement/tenderness/nodules; no carotid bruit or JVD  * Lungs: Respirations unlabored, Good bilateral air entry, normal breath sounds (Clear to auscultation bilaterally, no audible wheezes, crackles, or rhonchi)  * Heart: Regular Rate and Rhythm, normal S1 and S2, no audible murmur, rub, or gallop  * Abdomen: Symmetric, non-distended, no scar, soft, non-tender, bowel sounds active all four quadrants, no masses, no organomegaly (no hepatosplenomegaly)  * Extremities: Extremities swollen non pitting, stasis dermatitis, mild warmth to touch, no cyanosis, adequate dorsalis pedis pulses  * Pulses: 2+ and symmetric all extremities  * Lymph nodes: Cervical, supraclavicular, and axillary nodes normal  * Neurologic: CNII-XII intact, normal strength, sensation and reflexes throughout

## 2021-07-25 NOTE — H&P ADULT - ATTENDING COMMENTS
HPI:  History of Present Illness    Ms. Callahan is a 76 year old female patient known to have:  - Baseline: lives alone (daughter lives 2 minutes away), alert and oriented to time, place, and person, ambulates with roll aider, Ambien 10mg at bedtime and Xanax 1mg PRN  - Hypertension. Home medication Losartan 25mg QD  - Venous Insufficiency s/p venous graft. 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 Pulmonologist Dr Mimi Hickey. Currently off anticoagulation (was on eliquis 5mg BID). Last TTE 04/29/19 trace IA, mod AR, mild-mod TR   - History of Left Sided Ductal Breast Carcinoma s/p left sided Lumpectomy. Follows with Dr Flores  - Asthma. Controlled. Home med Montelukast 10mg QD, Symbicort 160-4.5 2 puffs BID , Epi pen  - Bilateral Knee Osteoarthritis and back. Pain Specialist Dr Thomason s/p epidural with no improvement.  There was a plan for nerve block in case of persistence of pain. Home medication Oxycodone 10mg every 6 hours (additional 5 allowed in between)  - Hypothyroidism s/p Thyroidectomy in 2017. Last TSH 1.95 05/09/2019. Home medication Levothyroxine 200 mcg QD  - GERD. Home med Protonix 40mg QD  - On Aspirin 81mg QD for primary prophylaxis  She was brought to the ED on 07/25 following a fall.  History goes back to one night prior to presentation when the patient received a dose of percocet 10mg and Ambien 10mg at night.  She then was on her way to the kitchen when she fell on her buttock.  Fall was not witnessed but by time daughter arrived, she was found on the floor (but not sure for how long she was on the floor).  Before the episode, patient denied any lightheadedness, chest pain, diaphoresis, or palpitations.  No sick contacts.   No recent travel or exposure to recent travelers.  Upon presentation to the ED, Vital Signs:  - /97 mmHg  - HR 98 bpm  - RR 16 bpm  - T 99.7  - SaO2 100% on RA  Investigations in ED  - CBC  --> WBC 10.89, Hb 13.0, Plt 157  - Chemistry  --> Na 137, K 3.9. AG 12, BUN 14, Cr 0.7, Ca 8.9  --> T bili 0.9, ALP 77, AST 33, ALT 22, Lipase 12  -->   --> Troponin 0.02 x1   --> Serum Alcohol <10    --> Urinalysis (07/25) negative  --> Urine culture (07/25) received  Patient was admitted to  for telemetry monitoring and placement.   (25 Jul 2021 02:29)    REVIEW OF SYSTEMS: see cc/HPI   CONSTITUTIONAL: generalized weakness/ fall, fevers or chills  EYES/ENT: No visual changes;  No vertigo or throat pain   NECK: No pain or stiffness  RESPIRATORY: No cough, wheezing, hemoptysis; No shortness of breath  CARDIOVASCULAR: No chest pain or palpitations  GASTROINTESTINAL: No abdominal or epigastric pain. No nausea, vomiting, or hematemesis; No diarrhea or constipation. No melena or hematochezia.  GENITOURINARY: No dysuria, frequency or hematuria  NEUROLOGICAL: No numbness or weakness  SKIN: No itching, rashes    Physical Exam:  General: WN/WD NAD  Neurology: A&Ox3, nonfocal, follows commands  Eyes: PERRLA/ EOMI  ENT/Neck: Neck supple, trachea midline, No JVD  Respiratory: CTA B/L, No wheezing, rales, rhonchi  CV: Normal rate regular rhythm, S1S2, no murmurs, rubs or gallops  Abdominal: Soft, NT, ND +BS,   Extremities: No edema, + peripheral pulses  Skin: No Rashes, Hematoma, Ecchymosis  Incisions: n/a  Tubes: n/A    A/p  Fall -unwitnessed and down for ?? hours - possible oversedation r/o arrhythmia r/o seizure  Inability to ambulate - possible oversedation   -Discussed w/ daughter Brie avoiding polypharmacy   -tele monitoring and serial EKG  -EEG   -PT/Rehab eval   -Fall precautions     Chronic pain -low back and bilateral knee OA   -avoid narcotic if possible   -agree w/ pain consult     HTN -elevated in the ED  -titrate Rx     Asthma   -c/w outpatient Rx ( PRN albuterol)  Social service consult  DVT prophylaxis

## 2021-07-25 NOTE — H&P ADULT - NSHPSOCIALHISTORY_GEN_ALL_CORE
- Smoking History:   - Alcohol Intake:  - Substance Abuse:  - Marital Status:  - Living Conditions:  - Sexual Activity:  - Work: - Smoking History: never smoker per patient  - Alcohol Intake: none per patient  - Substance Abuse: none per patient  - Living Conditions: Lives alone, 2 minute away from daughter

## 2021-07-25 NOTE — H&P ADULT - HISTORY OF PRESENT ILLNESS
History of Present Illness  Ms. Callahan is a 76 year old female patient known to have:  - Hypertension. Home medication Losartan 25mg QD  - History of Subsegmental PE at bifurcation of posterior and lateral subsegment of RLL on 04/27/2019. Currently off anticoagulation (was on eliquis 5mg BID). Last TTE 04/29/19 trace AR, mod AR, mild-mod TR  - History of Left Sided Ductal Breast Carcinoma s/p left sided Lumpectomy. Home medication Oxycodone 10mg every 6 hours  - Asthma. Controlled. Home med Montelukast 10mg QD, Symbicort 160-4.5 2 puffs BID , Epi pen  - Bilateral Knee Osteoarthritis  - Dyslipidemia. Last Lipid Profile 05/09/2019 Chol 160, LDL 99, HDL 60, TG 86. Home medication Lipitor 40mg QD  - Hypothyroidism. Last TSH 1.95 05/09/2019. Home medication Levothyroxine 25 mcg QD    She was brought to the ED on 07/25 following a fall.  History goes back to one night prior to presentation when the patient received a dose of percocet and Ambien at night.  She then was on her way to the kitchen when she fell on her buttock.  Before the episode, patient denied any light headedness, chest pain, diaphoresis, or palpitations.  During the episode, patient denies any head trauma, loss of consciousness, seizure like activity (jerky movements of extremities, incontinence, tongue biting, drooling, uprolling of eyes) but she was not able to stand up due to bilateral knee and lower back pain.  After the episode, patient was not confused and was at baseline mentation.    On review of systems, patient denies any recent fever, chills, night sweats, URTI symptoms (cough, rhinorrhea, sore throat), urinary symptoms (urinary frequency, urgency, intermittence, dysuria, foul smelling urine, cloudy urine), change in bowel movements (diarrhea or constipation), abdominal pain, headache, nausea, or vomiting.   No sick contacts.   No recent travel or exposure to recent travelers.      Upon presentation to the ED, Vital Signs:  - /97 mmHg  - HR 98 bpm  - RR 16 bpm  - T 99.7  - SaO2 100% on RA      Investigations in ED  - CBC  --> WBC 10.89, Hb 13.0, Plt 157    - Chemistry  --> Na 137, K 3.9. AG 12, BUN 14, Cr 0.7, Ca 8.9  --> T bili 0.9, ALP 77, AST 33, ALT 22, Lipase 12  -->   --> Troponin 0.02 x1   --> Serum Alcohol <10    - Imaging  --> XR pelvis (07/25) no pelvic fracture  --> CT H without contrast (07/25) no acute intracranial process  --> CT C-spine wo contrast (07/25) no fracture or subluxation  --> CT CAP IC (07/25) no acute intra thoracic or abdominal process, left suprahilar patchy groundglass opacities suggestive of inflammation    - Microbiology  --> COVID PCR negative 07/25  --> Urinalysis (07/25) negative  --> Urine culture (07/25) received      Patient was admitted to  for telemetry monitoring and placement.   History of Present Illness  Ms. Callahan is a 76 year old female patient known to have:  - Hypertension. Home medication Losartan 25mg QD  - History of Subsegmental PE at bifurcation of posterior and lateral subsegment of RLL on 04/27/2019. Currently off anticoagulation (was on eliquis 5mg BID). Last TTE 04/29/19 trace MS, mod AR, mild-mod TR  - History of Left Sided Ductal Breast Carcinoma s/p left sided Lumpectomy  - Asthma. Controlled. Home med Montelukast 10mg QD, Symbicort 160-4.5 2 puffs BID , Epi pen  - Bilateral Knee Osteoarthritis. Home medication Oxycodone 10mg every 6 hours  - Dyslipidemia. Last Lipid Profile 05/09/2019 Chol 160, LDL 99, HDL 60, TG 86. Home medication Lipitor 40mg QD  - Hypothyroidism. Last TSH 1.95 05/09/2019. Home medication Levothyroxine 25 mcg QD    She was brought to the ED on 07/25 following a fall.  History goes back to one night prior to presentation when the patient received a dose of percocet and Ambien at night.  She then was on her way to the kitchen when she fell on her buttock.  Before the episode, patient denied any light headedness, chest pain, diaphoresis, or palpitations.  During the episode, patient denies any head trauma, loss of consciousness, seizure like activity (jerky movements of extremities, incontinence, tongue biting, drooling, uprolling of eyes) but she was not able to stand up due to bilateral knee and lower back pain.  After the episode, patient was not confused and was at baseline mentation.    On review of systems, patient denies any recent fever, chills, night sweats, URTI symptoms (cough, rhinorrhea, sore throat), urinary symptoms (urinary frequency, urgency, intermittence, dysuria, foul smelling urine, cloudy urine), change in bowel movements (diarrhea or constipation), abdominal pain, headache, nausea, or vomiting.   No sick contacts.   No recent travel or exposure to recent travelers.      Upon presentation to the ED, Vital Signs:  - /97 mmHg  - HR 98 bpm  - RR 16 bpm  - T 99.7  - SaO2 100% on RA      Investigations in ED  - CBC  --> WBC 10.89, Hb 13.0, Plt 157    - Chemistry  --> Na 137, K 3.9. AG 12, BUN 14, Cr 0.7, Ca 8.9  --> T bili 0.9, ALP 77, AST 33, ALT 22, Lipase 12  -->   --> Troponin 0.02 x1   --> Serum Alcohol <10    - Imaging  --> XR pelvis (07/25) no pelvic fracture  --> CT H without contrast (07/25) no acute intracranial process  --> CT C-spine wo contrast (07/25) no fracture or subluxation  --> CT CAP IC (07/25) no acute intra thoracic or abdominal process, left suprahilar patchy groundglass opacities suggestive of inflammation    - Microbiology  --> COVID PCR negative 07/25  --> Urinalysis (07/25) negative  --> Urine culture (07/25) received      Patient was admitted to  for telemetry monitoring and placement.   History of Present Illness    Ms. Callahan is a 76 year old female patient known to have:  - Baseline: lives alone (daughter lives 2 minutes away), alert and oriented to time, place, and person, ambulates with roll aider, Ambien 10mg at bedtime and Xanax 1mg PRN  - Hypertension. Home medication Losartan 25mg QD  - Venous Insufficiency s/p venous graft. 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 Pulmonologist Dr Mimi Hickey. Currently off anticoagulation (was on eliquis 5mg BID). Last TTE 04/29/19 trace HI, mod AR, mild-mod TR   - History of Left Sided Ductal Breast Carcinoma s/p left sided Lumpectomy. Follows with Dr Flores  - Asthma. Controlled. Home med Montelukast 10mg QD, Symbicort 160-4.5 2 puffs BID , Epi pen  - Bilateral Knee Osteoarthritis and back. Pain Specialist Dr Thomason s/p epidural with no improvement.  There was a plan for nerve block in case of persistence of pain. Home medication Oxycodone 10mg every 6 hours (additional 5 allowed in between)  - Hypothyroidism s/p Thyroidectomy in 2017. Last TSH 1.95 05/09/2019. Home medication Levothyroxine 200 mcg QD  - GERD. Home med Protonix 40mg QD  - On Aspirin 81mg QD for primary prophylaxis    She was brought to the ED on 07/25 following a fall.  History goes back to one night prior to presentation when the patient received a dose of percocet 10mg and Ambien 10mg at night.  She then was on her way to the kitchen when she fell on her buttock.  Fall was not witnessed but by time daughter arrived, she was found on the floor (but not sure for how long she was on the floor).  Before the episode, patient denied any light headedness, chest pain, diaphoresis, or palpitations.  During the episode, patient denies any head trauma, loss of consciousness, seizure like activity (jerky movements of extremities, incontinence, tongue biting, drooling, uprolling of eyes) but she was not able to stand up due to bilateral knee and lower back pain.  After the episode, patient was not confused and was at baseline mentation.    On review of systems, patient denies any recent fever, chills, night sweats, URTI symptoms (cough, rhinorrhea, sore throat), urinary symptoms (urinary frequency, urgency, intermittence, dysuria, foul smelling urine, cloudy urine), change in bowel movements (diarrhea or constipation), abdominal pain, headache, nausea, or vomiting.   No sick contacts.   No recent travel or exposure to recent travelers.      Upon presentation to the ED, Vital Signs:  - /97 mmHg  - HR 98 bpm  - RR 16 bpm  - T 99.7  - SaO2 100% on RA      Investigations in ED  - CBC  --> WBC 10.89, Hb 13.0, Plt 157    - Chemistry  --> Na 137, K 3.9. AG 12, BUN 14, Cr 0.7, Ca 8.9  --> T bili 0.9, ALP 77, AST 33, ALT 22, Lipase 12  -->   --> Troponin 0.02 x1   --> Serum Alcohol <10    - Imaging  --> XR pelvis (07/25) no pelvic fracture  --> CT H without contrast (07/25) no acute intracranial process  --> CT C-spine wo contrast (07/25) no fracture or subluxation  --> CT CAP IC (07/25) no acute intra thoracic or abdominal process, left suprahilar patchy groundglass opacities suggestive of inflammation    - Microbiology  --> COVID PCR negative 07/25  --> Urinalysis (07/25) negative  --> Urine culture (07/25) received      Patient was admitted to  for telemetry monitoring and placement.

## 2021-07-25 NOTE — ASSESSMENT
[FreeTextEntry1] : 77 yo female has stage IA ([id number]) IDC of the left breast, G1, ER/ME positive, Her-2 negative, s/p left breast lumpectomy and SLNB.\par RS 6.\par \par Recommendation:\par -- Continue Anastrozole daily, calcium and vitamin D supplement.\par -- She has mild osteopenia in the femoral neck. Encourage more physical activities. She will have repeat bone density this month, was due in June.\par -- Breast exam is unrevealing. Mammo and US results reviewed with the patient. Repeat in 6 months recommended.\par -- She had provoked segmental PE in 4/2019 and completed Eliquis. Repeat CT showed resolution of subsegmental pulmonary emboli in the right lower lobe.\par -- Followup with PCP for other medical problems.\par -- Followup with Dr. Harley for back pain.\par -- RTO for followup in 6 months.\par \par Case discussed and patient seen with Dr. Flores.\par \par \par \par \par

## 2021-07-25 NOTE — PHYSICAL EXAM
[Restricted in physically strenuous activity but ambulatory and able to carry out work of a light or sedentary nature] : Status 1- Restricted in physically strenuous activity but ambulatory and able to carry out work of a light or sedentary nature, e.g., light house work, office work [Normal] : affect appropriate [de-identified] : Status post left breast lumpectomy and SLNB. The surgical scars are healing well.

## 2021-07-25 NOTE — HISTORY OF PRESENT ILLNESS
[de-identified] : 75 yo female is referred by Dr. Stewart for consultation of systemic adjuvant therapy for newly diagnosed left sided breast cancer. The patient had a screening mammogram on 12/13/2018 where segmental calcifications were identified in the left breast at 12:00 position middle depth. She was subsequently recalled for a diagnostic mammogram, performed on 1/22/2019. Spot magnification views demonstrated a 6 cm area of segmental amorphous calcifications in the left breast at 12:00 position middle depth, for which a stereotactic biopsy was recommended. \par \par A stereotactic biopsy performed on 1/22/2019 demonstrated invasive moderately differentiated ductal carcinoma and sclerosing intraductal papilloma at the most anterior aspect of the biopsied calcifications and fibroadenomatoid change at the most posterior aspect of the biopsied calcifications. \par Estrogen receptor positive %\par Progesterone receptor positive %\par HER2 negative Negative 0\par Ki-67: 3%\par \par On 2/23/19, b/l breast MRI showed:\par RIGHT BREAST: \par Slightly irregular, linear nonmass enhancement in the central aspect of the right breast measures approximately 0.7 cm. MRI guided biopsy is recommended. No additional suspicious abnormal enhancement is seen in the right breast. No right axillary adenopathy. \par LEFT BREAST: \par 2 biopsy clips are seen in the superior aspect of the left breast. Mild patchy nonmass enhancement is seen surrounding the biopsy clips; linear nonmass enhancement is seen along the anterior and lateral aspect of the superior biopsy clip measuring approximately 1.8 cm. These correspond to the calcifications seen on the mammogram and to the biopsy-proven carcinoma. \par \par On 3/7/19, MRI guided core biopsy of right nonmass enhancement showed proliferative type fibrocystic changes. \par \par On 4/3/19, she underwent Left UOQ NLOC and Left SLNB. The pathology demonstrated 20 mm invasive well differentiated IDC and non extensive DCIS solid and cribriform types with comedo necrosis and calcifications low to intermediate nuclear grade; no LVI is seen; with negative margins. 0/2 (-) SLNB; AJCC 8th Edition Pathologic Stage: pT1c, p(sn)N0, pMx. \par \par The surgical specimen was sent for Oncotype dx analysis. her RS is 6, in the low risk range and predicting 3% risk of distant recurrence with endocrine therapy.\par \par The patient recovered well from surgery. She is here with her daughter to discuss systemic adjuvant therapy. \par \par Her PMH is significant for subsegmental PE found in 4/2019. She has been taking Eliquis. She has b/l leg venous insufficiency. \par \par She has no family history of breast cancer and ovarian cancer. \par  [de-identified] : 8/29/19\par Patient is here today for follow up visit accompanied by her daughter, Brie. She started Anastrozole 6/2019.  Initially she experienced fatigue but now tolerating it a little better. She takes Oxycodone prn for low back pain. Her daughter will take her to pain management, Dr. Harley. She wants to continue Anastrozole for now but may need to switch AI if pain worsens.\par She saw nash Morales, today for follow up of PE on Eliquis.  Repeat VQ scan and CXR ordered.  F/U appt 10/16/19.\par She had bone density on 6/13/19 which showed osteopenia in femoral neck.  She takes calcium and Vit D.\par \par 11/14/19:\par Patient is here today for follow up visit accompanied by her daughter. She has been taking Anastrozole daily since 6/2019. She has chronic joint aching and back pain. She takes Oxycodone prn for low back pain. In 9/2019, she had MRI lumbar spine which showed degenerative change, disc bulging and spinal stenosis. She sees Dr. Harley for pain management. She is taking Eliquis for h/o PE.\par She does not have breast related symptoms. She had b/l dx mammo in 11/2019. There is no suspicious finding. Bone density in 6/2019 showed mild osteopenia in femoral neck.  \par  \par 7/9/2020:\par Patient is here today for follow up visit. Her daughter is with her. She was diagnosed with stage IA ([id number]) IDC of the left breast, G1, ER/[place] positive, Her-2 negative, s/p left breast lumpectomy and SLNB in 4/2019. She has been taking Anastrozole daily since 6/2019. She has chronic joint aching and back pain. She takes Oxycodone prn for low back pain. In 9/2019, she had MRI lumbar spine which showed degenerative change, disc bulging and spinal stenosis. She sees Dr. Harley for pain management. She had provoked segmental PE in 4/2019 and has been on Eliquis for more than one year.  \par She had left breast dx mammo and US today. There was no suspicious finding. She does not have breast related symptoms. Bone density in 6/2019 showed mild osteopenia in femoral neck.  \par  \par 1/21/2021:\par Patient is here today for follow up visit. Her daughter is with her. She was diagnosed with stage IA ([id number]) IDC of the left breast, G1, ER/KY positive, Her-2 negative, s/p left breast lumpectomy and SLNB in 4/2019. She has been taking Anastrozole daily since 6/2019. She has chronic joint aching and back pain. She takes Oxycodone prn for low back pain. In 9/2019, she had MRI lumbar spine which showed degenerative change, disc bulging and spinal stenosis. She sees Dr. Harley for pain management. \par She had provoked segmental PE in 4/2019 and was Eliquis for more than one year.  The repeat CT chest in 7/2020 showed No filling defects in the main pulmonary artery or segmental branches to suggest pulmonary embolus. Interval resolution of subsegmental pulmonary emboli in the right lower lobe. New groundglass opacity  in the left upper lobe. Differential includes infectious or inflammatory etiology. She saw Dr. Hickey and was given a course of ABx and had repeat CT chest which showed resolution of groundglass opacity.\par She had b/l breast dx mammo and left breast US on 1/12/2021 which showed Within the lumpectomy site at the 12:00 position, 7 cm from the nipple, there is an oval circumscribed pocket of complex fluid measuring 2.3 x 2.5 x 1.4 cm. This has markedly decreased since the prior exam with measured up to 6.1 cm\par \par 7/21/21: Patient is here today for follow up visit. Her daughter is with her. She was diagnosed with stage IA ([id number]) IDC of the left breast, G1, ER/KY positive, Her-2 negative, s/p left breast lumpectomy and SLNB in 4/2019. She has been taking Anastrozole daily since 6/2019.  She is tolerating it well, no side effects.  Patient denies cough, shortness of breath, fever, chills, night sweats or bone pain.\par \par

## 2021-07-25 NOTE — RESULTS/DATA
[FreeTextEntry1] :  MG US BREAST LIMITED LT\par EXAM: MG MAMMO DIAG W TORSTEN LT#\par \par \par PROCEDURE DATE: 07/13/2021\par \par \par \par INTERPRETATION: Clinical History / Reason for exam: Left breast malignancy status post lumpectomy in 2019.\par \par The patient reports last clinical breast examination was performed about twelve months ago.\par \par Family history of breast cancer: There is no family history of breast cancer.\par \par Comparisons: Mammograms dating back to 2019.\par \par Views obtained: left full field digital 2D and digital tomosynthesis images as well as magnification views.\par \par Computer-aided detection was utilized in the interpretation of this examination.\par \par Breast composition: There are scattered areas of fibroglandular density.\par \par Findings:\par \par Mammogram:\par There is redemonstration of postsurgical changes at lumpectomy site in the lateral left breast. There are grouped calcifications noted at the lumpectomy site which may represent evolving fat necrosis. However, short interval follow-up recommended.\par No suspicious mass, or areas of architectural distortion seen in the left breast.\par \par Ultrasound:\par \par Targeted unilateral left breast ultrasound was performed.\par \par There are postsurgical changes noted at 12-1 o'clock left breast at lumpectomy site correlating with mammographic finding above.\par \par Impression: Grouped calcifications noted at the lumpectomy site which may represent evolving fat necrosis. However, short interval follow-up recommended.\par \par Recommendation: Follow-up unilateral diagnostic mammogram in 6 months.\par \par

## 2021-07-26 ENCOUNTER — TRANSCRIPTION ENCOUNTER (OUTPATIENT)
Age: 76
End: 2021-07-26

## 2021-07-26 LAB
A1C WITH ESTIMATED AVERAGE GLUCOSE RESULT: 5.2 % — SIGNIFICANT CHANGE UP (ref 4–5.6)
ANION GAP SERPL CALC-SCNC: 12 MMOL/L — SIGNIFICANT CHANGE UP (ref 7–14)
BUN SERPL-MCNC: 11 MG/DL — SIGNIFICANT CHANGE UP (ref 10–20)
CALCIUM SERPL-MCNC: 8.3 MG/DL — LOW (ref 8.5–10.1)
CHLORIDE SERPL-SCNC: 104 MMOL/L — SIGNIFICANT CHANGE UP (ref 98–110)
CO2 SERPL-SCNC: 24 MMOL/L — SIGNIFICANT CHANGE UP (ref 17–32)
COVID-19 NUCLEOCAPSID GAM AB INTERP: NEGATIVE — SIGNIFICANT CHANGE UP
COVID-19 NUCLEOCAPSID TOTAL GAM ANTIBODY RESULT: 0.19 INDEX — SIGNIFICANT CHANGE UP
COVID-19 SPIKE DOMAIN AB INTERP: NEGATIVE — SIGNIFICANT CHANGE UP
COVID-19 SPIKE DOMAIN ANTIBODY RESULT: 0.4 U/ML — SIGNIFICANT CHANGE UP
CREAT SERPL-MCNC: 0.7 MG/DL — SIGNIFICANT CHANGE UP (ref 0.7–1.5)
CULTURE RESULTS: SIGNIFICANT CHANGE UP
ESTIMATED AVERAGE GLUCOSE: 103 MG/DL — SIGNIFICANT CHANGE UP (ref 68–114)
GLUCOSE SERPL-MCNC: 125 MG/DL — HIGH (ref 70–99)
HCT VFR BLD CALC: 36.3 % — LOW (ref 37–47)
HGB BLD-MCNC: 11.6 G/DL — LOW (ref 12–16)
MCHC RBC-ENTMCNC: 30.2 PG — SIGNIFICANT CHANGE UP (ref 27–31)
MCHC RBC-ENTMCNC: 32 G/DL — SIGNIFICANT CHANGE UP (ref 32–37)
MCV RBC AUTO: 94.5 FL — SIGNIFICANT CHANGE UP (ref 81–99)
NRBC # BLD: 0 /100 WBCS — SIGNIFICANT CHANGE UP (ref 0–0)
PLATELET # BLD AUTO: 146 K/UL — SIGNIFICANT CHANGE UP (ref 130–400)
POTASSIUM SERPL-MCNC: 4 MMOL/L — SIGNIFICANT CHANGE UP (ref 3.5–5)
POTASSIUM SERPL-SCNC: 4 MMOL/L — SIGNIFICANT CHANGE UP (ref 3.5–5)
RBC # BLD: 3.84 M/UL — LOW (ref 4.2–5.4)
RBC # FLD: 12.5 % — SIGNIFICANT CHANGE UP (ref 11.5–14.5)
SARS-COV-2 IGG+IGM SERPL QL IA: 0.19 INDEX — SIGNIFICANT CHANGE UP
SARS-COV-2 IGG+IGM SERPL QL IA: 0.4 U/ML — SIGNIFICANT CHANGE UP
SARS-COV-2 IGG+IGM SERPL QL IA: NEGATIVE — SIGNIFICANT CHANGE UP
SARS-COV-2 IGG+IGM SERPL QL IA: NEGATIVE — SIGNIFICANT CHANGE UP
SODIUM SERPL-SCNC: 140 MMOL/L — SIGNIFICANT CHANGE UP (ref 135–146)
SPECIMEN SOURCE: SIGNIFICANT CHANGE UP
TROPONIN T SERPL-MCNC: <0.01 NG/ML — SIGNIFICANT CHANGE UP
WBC # BLD: 7.43 K/UL — SIGNIFICANT CHANGE UP (ref 4.8–10.8)
WBC # FLD AUTO: 7.43 K/UL — SIGNIFICANT CHANGE UP (ref 4.8–10.8)

## 2021-07-26 PROCEDURE — 99233 SBSQ HOSP IP/OBS HIGH 50: CPT

## 2021-07-26 PROCEDURE — 71045 X-RAY EXAM CHEST 1 VIEW: CPT | Mod: 26

## 2021-07-26 RX ADMIN — LOSARTAN POTASSIUM 25 MILLIGRAM(S): 100 TABLET, FILM COATED ORAL at 06:03

## 2021-07-26 RX ADMIN — ENOXAPARIN SODIUM 40 MILLIGRAM(S): 100 INJECTION SUBCUTANEOUS at 17:26

## 2021-07-26 RX ADMIN — PANTOPRAZOLE SODIUM 40 MILLIGRAM(S): 20 TABLET, DELAYED RELEASE ORAL at 06:03

## 2021-07-26 RX ADMIN — MONTELUKAST 10 MILLIGRAM(S): 4 TABLET, CHEWABLE ORAL at 12:09

## 2021-07-26 RX ADMIN — OXYCODONE AND ACETAMINOPHEN 1 TABLET(S): 5; 325 TABLET ORAL at 08:50

## 2021-07-26 RX ADMIN — CHLORHEXIDINE GLUCONATE 1 APPLICATION(S): 213 SOLUTION TOPICAL at 06:05

## 2021-07-26 RX ADMIN — Medication 200 MICROGRAM(S): at 06:03

## 2021-07-26 RX ADMIN — ENOXAPARIN SODIUM 40 MILLIGRAM(S): 100 INJECTION SUBCUTANEOUS at 06:05

## 2021-07-26 RX ADMIN — Medication 81 MILLIGRAM(S): at 12:09

## 2021-07-26 RX ADMIN — OXYCODONE AND ACETAMINOPHEN 1 TABLET(S): 5; 325 TABLET ORAL at 08:19

## 2021-07-26 RX ADMIN — BUDESONIDE AND FORMOTEROL FUMARATE DIHYDRATE 2 PUFF(S): 160; 4.5 AEROSOL RESPIRATORY (INHALATION) at 20:10

## 2021-07-26 NOTE — CONSULT NOTE ADULT - ASSESSMENT
76 year old female patient with HTN, DL, OA, Asthma, Hypothyroidism, history of breast cancer, on oxycodone/acetaminophen 10/325mg QID PRN and amvien 10mg QHS PRN managed by Dr. Harley for chronic low back and knee pain, and hx of PE who was brought to the ED on 07/25 following a fall after a dose of percocet and ambien, found to have negative trauma workup, admitted to telemetry for monitoring and for placement.  76 year old female patient with HTN, DL, OA, Asthma, Hypothyroidism, history of breast cancer, on oxycodone/acetaminophen 10/325mg QID PRN and amvien 10mg QHS PRN managed by Dr. Harley for chronic low back and knee pain, and hx of PE who was brought to the ED on 07/25 following a fall after a dose of percocet and ambien, found to have negative trauma workup, admitted to telemetry for monitoring and for placement.     May consider the following recommendations:  - Start tyleno 975mg TID standing  - Continue home dose oxycodone 10mg QID PRN  - Start tizanidine 2mg TID PRN  - Start lidocaine patch to low back q12hrs  - Start cyclobenzaprine 5mg QHS   - Follow up with Dr. Harley as outpatient

## 2021-07-26 NOTE — PHYSICAL THERAPY INITIAL EVALUATION ADULT - LEVEL OF INDEPENDENCE: SUPINE/SIT, REHAB EVAL
pt declined attempt to move to EOB 2 * back and knee pain as above, PT changed bed to semi chair position, pt stated it felt good to sit up, PT encourage pt to change position frequently t/o the day, pt agreed/unable to perform

## 2021-07-26 NOTE — CHART NOTE - NSCHARTNOTEFT_GEN_A_CORE
She was brought to the ED on 07/25 following a fall. History goes back to one night prior to presentation when the patient received a dose of percocet 10mg and Ambien 10mg at night. She then was on her way to the kitchen when she fell on her buttock. Fall was not witnessed but by time daughter arrived, she was found on the floor (but not sure for how long she was on the floor).    The pt has a mobility limitation that significantly impairs the pts ability to participate in one or more MRADLs such as toileting, eating, dressing, and bathing in customary locations in the home. The pts home provides adequate access between rooms for the use of the transport wheelchair. The transport wheelchair will significantly improve the pts ability to participate in MRADLs and will be used on a regular basis in the home. The pts mobility limitation cannot be resolved by the use of a walker or cane. The pt does not have sufficient upper extremity function to safely propel the manual wheelchair in the home during a typical day. The pt has agreed to use the transport wheelchair that is provided in the home. The pt has a caregiver that is willing and able to propel the transport wheelchair at anytime during the day. Transport wheelchair script / Letter of medical necessity  - Transport wheelchair, anti tippers, cushion, seat belt, elevated leg rests.   - Height 165 cm, weight 140 kg.  - Admit diagnosis: Fall  - Length of need - 99 months      She was brought to the ED on 07/25 following a fall. History goes back to one night prior to presentation when the patient received a dose of percocet 10mg and Ambien 10mg at night. She then was on her way to the kitchen when she fell on her buttock. Fall was not witnessed but by time daughter arrived, she was found on the floor (but not sure for how long she was on the floor). Before the episode, patient denied any light headedness, chest pain, diaphoresis, or palpitations. During the episode, patient denies any head trauma, loss of consciousness, seizure like activity (jerky movements of extremities, incontinence, tongue biting, drooling, uprolling of eyes) but she was not able to stand up due to bilateral knee and lower back pain. After the episode, patient was not confused and was at baseline mentation.    The pt has a mobility limitation that significantly impairs the pts ability to participate in one or more MRADLs such as toileting, eating, dressing, and bathing in customary locations in the home. The pts home provides adequate access between rooms for the use of the transport wheelchair. The transport wheelchair will significantly improve the pts ability to participate in MRADLs and will be used on a regular basis in the home. The pts mobility limitation cannot be resolved by the use of a walker or cane. The pt does not have sufficient upper extremity function to safely propel the manual wheelchair in the home during a typical day. The pt has agreed to use the transport wheelchair that is provided in the home. The pt has a caregiver that is willing and able to propel the transport wheelchair at anytime during the day.

## 2021-07-26 NOTE — DISCHARGE NOTE NURSING/CASE MANAGEMENT/SOCIAL WORK - PATIENT PORTAL LINK FT
You can access the FollowMyHealth Patient Portal offered by [facility] by registering at the following website: [email or web address]. By joining [employer]’s FollowMyHealth portal, you will also be able to view your health information using other applications (apps) compatible with our system.

## 2021-07-26 NOTE — PROGRESS NOTE ADULT - SUBJECTIVE AND OBJECTIVE BOX
EL STEIN 76y Female  MRN#: [id number]   CODE STATUS:________    Hospital Day: 2d    Pt is currently admitted with the primary diagnosis of elevated troponin, and fall.     The night prior to hospitalization, patient received a dose of percocet 10mg and Ambien 10mg at night and then fell on her buttock while walking to the kitchen around 11PM. Before the episode, patient denied any light headedness, chest pain, diaphoresis, or palpitations. During the fall the patient admitted she landed on buttock and denied head trauma, loss of consciousness, or seizure like activity (jerky movements of extremities, incontinence, tongue biting, drooling, uprolling of eyes). She was not able to stand up due to bilateral knee and lower back pain. Daughter found her the next day around 3PM and stated patient was lethargic when she found her.    On review of systems, patient denies recent fever, chills, night sweats, URTI symptoms (cough, rhinorrhea, sore throat), urinary symptoms (urinary frequency, urgency, intermittence, dysuria, foul smelling urine, cloudy urine), change in bowel movements (diarrhea or constipation), abdominal pain, headache, nausea, or vomiting.   No sick contacts.   No recent travel or exposure to recent travelers.    SUBJECTIVE  Hospital Course    She was brought to the ED on  following a fall. Xray    Overnight events     Subjective complaints     Present Today:   - Gomez:  No [  ], Yes [   ] : Indication:     - Type of IV Access:       .. CVC/Piccline:  No [  ], Yes [   ] : Indication:       .. Midline: No [  ], Yes [   ] : Indication:                                             ----------------------------------------------------------  OBJECTIVE  PAST MEDICAL & SURGICAL HISTORY  Asthma    HTN (hypertension)    PVD (peripheral vascular disease)    Breast cancer  LEFT    OA (osteoarthritis)    Pulmonary embolism  reports resolved and not on AC    H/O thyroidectomy  2017    S/P lumpectomy, left breast                                              -----------------------------------------------------------  ALLERGIES:  anesthesia (Anaphylaxis)  esmolol (Anaphylaxis)  rocuronium (Anaphylaxis)                                            ------------------------------------------------------------    HOME MEDICATIONS  Home Medications:  Ambien 10 mg oral tablet: 1 tab(s) orally once a day (at bedtime) (2021 04:37)  aspirin 81 mg oral tablet: 1 tab(s) orally once a day (2021 04:37)  calcitriol 0.5 mcg oral capsule: 1 cap(s) orally once a week (2021 04:37)  levothyroxine 200 mcg (0.2 mg) oral tablet: 1 tab(s) orally once a day (2021 04:37)  montelukast 10 mg oral tablet: 1 tab(s) orally once a day (2021 04:37)  Percocet 10/325 oral tablet: 1 tab(s) orally every 6 hours (2021 04:37)  Symbicort 160 mcg-4.5 mcg/inh inhalation aerosol: 2 puff(s) inhaled 2 times a day (2021 04:37)  Xanax 1 mg oral tablet: 1 tab(s) orally once a day, As Needed (2021 04:37)                           MEDICATIONS:  STANDING MEDICATIONS  aspirin  chewable 81 milliGRAM(s) Oral daily  budesonide 160 MICROgram(s)/formoterol 4.5 MICROgram(s) Inhaler 2 Puff(s) Inhalation two times a day  chlorhexidine 4% Liquid 1 Application(s) Topical <User Schedule>  enoxaparin Injectable 40 milliGRAM(s) SubCutaneous two times a day  levothyroxine 200 MICROGram(s) Oral daily  losartan 25 milliGRAM(s) Oral daily  montelukast 10 milliGRAM(s) Oral daily  pantoprazole    Tablet 40 milliGRAM(s) Oral before breakfast  silver sulfADIAZINE 1% Cream 1 Application(s) Topical daily    PRN MEDICATIONS  acetaminophen    Suspension .. 650 milliGRAM(s) Oral every 6 hours PRN  oxycodone    5 mG/acetaminophen 325 mG 1 Tablet(s) Oral every 6 hours PRN                                            ------------------------------------------------------------  VITAL SIGNS: Last 24 Hours  T(C): 37.1 (2021 05:32), Max: 37.1 (2021 14:06)  T(F): 98.8 (2021 05:32), Max: 98.8 (2021 05:32)  HR: 82 (2021 05:32) (82 - 95)  BP: 128/69 (2021 05:32) (128/63 - 132/61)  BP(mean): --  RR: 18 (2021 05:32) (18 - 18)  SpO2: 97% (2021 06:17) (97% - 97%)      21 @ 07:01  -  - @ 07:00  --------------------------------------------------------  IN: 440 mL / OUT: 1950 mL / NET: -1510 mL                                             --------------------------------------------------------------  LABS:                        12.2   9.24  )-----------( 130      ( 2021 11:55 )             37.2     07    139  |  103  |  11  ----------------------------<  83  4.3   |  22  |  0.6<L>    Ca    8.4<L>      2021 11:55  Mg     1.9         TPro  5.9<L>  /  Alb  3.7  /  TBili  1.0  /  DBili  x   /  AST  28  /  ALT  18  /  AlkPhos  69  -    PT/INR - ( 2021 11:55 )   PT: 13.00 sec;   INR: 1.13 ratio         PTT - ( 2021 11:55 )  PTT:37.8 sec  Urinalysis Basic - ( 2021 18:53 )    Color: Yellow / Appearance: Clear / S.031 / pH: x  Gluc: x / Ketone: Small  / Bili: Negative / Urobili: <2 mg/dL   Blood: x / Protein: Trace / Nitrite: Negative   Leuk Esterase: Negative / RBC: x / WBC x   Sq Epi: x / Non Sq Epi: x / Bacteria: x        Troponin T, Serum: 0.02 ng/mL *H* (21 @ 11:55)  Creatine Kinase, Serum: 396 U/L *H* (21 @ 11:55)          CARDIAC MARKERS ( 2021 11:55 )  x     / 0.02 ng/mL / 396 U/L / x     / 1.8 ng/mL  CARDIAC MARKERS ( 2021 20:05 )  x     / 0.02 ng/mL / 635 U/L / x     / x                                                  -------------------------------------------------------------  RADIOLOGY:                                            --------------------------------------------------------------    PHYSICAL EXAM:  General:   HEENT:  LUNGS:  HEART:  ABDOMEN:  EXT:  NEURO:  SKIN:                                           --------------------------------------------------------------    ASSESSMENT & PLAN    Past medical history and hospital course                                                                                                           ----------------------------------------------------  # DVT prophylaxis     # GI prophylaxis     # Diet     # Activity Score (AM-PAC)    # Code status     # Disposition                                                                              --------------------------------------------------------    # Jerzy      EL STEIN 76y Female  MRN#: [id number]   CODE STATUS:________    Hospital Day: 2d    Pt is currently admitted with the primary diagnosis of elevated troponin, and fall.     The night prior to hospitalization, patient received a dose of percocet 10mg and Ambien 10mg at night and then fell on her buttock while walking to the kitchen around 11PM. Before the episode, patient denied any light headedness, chest pain, diaphoresis, or palpitations. During the fall the patient admitted she landed on buttock and denied head trauma, loss of consciousness, or seizure like activity (jerky movements of extremities, incontinence, tongue biting, drooling, uprolling of eyes). She was not able to stand up due to bilateral knee and lower back pain. Daughter found her the next day around 3PM and stated patient was lethargic when she found her.    On review of systems, patient denies recent fever, chills, night sweats, URTI symptoms (cough, rhinorrhea, sore throat), urinary symptoms (urinary frequency, urgency, intermittence, dysuria, foul smelling urine, cloudy urine), change in bowel movements (diarrhea or constipation), abdominal pain, headache, nausea, or vomiting.   No sick contacts.   No recent travel or exposure to recent travelers.    SUBJECTIVE  Hospital Course    She was brought to the ED on  following a fall. XR of pelvis, CT Head, non-contrast CT C-spine, CT CAP IC showed no pelvic fractures, no acute intracranial process, no cervical fracture of subluxation, and no acute thoracic/abdominal process. Left suprahilar patchy groundglass opacities were seen suggestive of inflammation.    Admitted to  for telemetry monitoring and placement.    Overnight events     Subjective complaints     Present Today:   - Gomez:  No [  ], Yes [   ] : Indication:     - Type of IV Access:       .. CVC/Piccline:  No [  ], Yes [   ] : Indication:       .. Midline: No [  ], Yes [   ] : Indication:                                             ----------------------------------------------------------  OBJECTIVE  PAST MEDICAL & SURGICAL HISTORY  Asthma    HTN (hypertension)    PVD (peripheral vascular disease)    Breast cancer  LEFT    OA (osteoarthritis)    Pulmonary embolism  reports resolved and not on AC    H/O thyroidectomy  2017    S/P lumpectomy, left breast                                              -----------------------------------------------------------  ALLERGIES:  anesthesia (Anaphylaxis)  esmolol (Anaphylaxis)  rocuronium (Anaphylaxis)                                            ------------------------------------------------------------    HOME MEDICATIONS  Home Medications:  Ambien 10 mg oral tablet: 1 tab(s) orally once a day (at bedtime) (2021 04:37)  aspirin 81 mg oral tablet: 1 tab(s) orally once a day (2021 04:37)  calcitriol 0.5 mcg oral capsule: 1 cap(s) orally once a week (2021 04:37)  levothyroxine 200 mcg (0.2 mg) oral tablet: 1 tab(s) orally once a day (2021 04:37)  montelukast 10 mg oral tablet: 1 tab(s) orally once a day (2021 04:37)  Percocet 10/325 oral tablet: 1 tab(s) orally every 6 hours (2021 04:37)  Symbicort 160 mcg-4.5 mcg/inh inhalation aerosol: 2 puff(s) inhaled 2 times a day (2021 04:37)  Xanax 1 mg oral tablet: 1 tab(s) orally once a day, As Needed (2021 04:37)                           MEDICATIONS:  STANDING MEDICATIONS  aspirin  chewable 81 milliGRAM(s) Oral daily  budesonide 160 MICROgram(s)/formoterol 4.5 MICROgram(s) Inhaler 2 Puff(s) Inhalation two times a day  chlorhexidine 4% Liquid 1 Application(s) Topical <User Schedule>  enoxaparin Injectable 40 milliGRAM(s) SubCutaneous two times a day  levothyroxine 200 MICROGram(s) Oral daily  losartan 25 milliGRAM(s) Oral daily  montelukast 10 milliGRAM(s) Oral daily  pantoprazole    Tablet 40 milliGRAM(s) Oral before breakfast  silver sulfADIAZINE 1% Cream 1 Application(s) Topical daily    PRN MEDICATIONS  acetaminophen    Suspension .. 650 milliGRAM(s) Oral every 6 hours PRN  oxycodone    5 mG/acetaminophen 325 mG 1 Tablet(s) Oral every 6 hours PRN                                            ------------------------------------------------------------  VITAL SIGNS: Last 24 Hours  T(C): 37.1 (2021 05:32), Max: 37.1 (2021 14:06)  T(F): 98.8 (2021 05:32), Max: 98.8 (2021 05:32)  HR: 82 (2021 05:32) (82 - 95)  BP: 128/69 (2021 05:32) (128/63 - 132/61)  BP(mean): --  RR: 18 (2021 05:32) (18 - 18)  SpO2: 97% (2021 06:17) (97% - 97%)      21 @ 07:01  -  21 @ 07:00  --------------------------------------------------------  IN: 440 mL / OUT: 1950 mL / NET: -1510 mL                                             --------------------------------------------------------------  LABS:                        12.2   9.24  )-----------( 130      ( 2021 11:55 )             37.2         139  |  103  |  11  ----------------------------<  83  4.3   |  22  |  0.6<L>    Ca    8.4<L>      2021 11:55  Mg     1.9         TPro  5.9<L>  /  Alb  3.7  /  TBili  1.0  /  DBili  x   /  AST  28  /  ALT  18  /  AlkPhos  69      PT/INR - ( 2021 11:55 )   PT: 13.00 sec;   INR: 1.13 ratio         PTT - ( 2021 11:55 )  PTT:37.8 sec  Urinalysis Basic - ( 2021 18:53 )    Color: Yellow / Appearance: Clear / S.031 / pH: x  Gluc: x / Ketone: Small  / Bili: Negative / Urobili: <2 mg/dL   Blood: x / Protein: Trace / Nitrite: Negative   Leuk Esterase: Negative / RBC: x / WBC x   Sq Epi: x / Non Sq Epi: x / Bacteria: x        Troponin T, Serum: 0.02 ng/mL *H* (21 @ 11:55)  Creatine Kinase, Serum: 396 U/L *H* (21 @ 11:55)          CARDIAC MARKERS ( 2021 11:55 )  x     / 0.02 ng/mL / 396 U/L / x     / 1.8 ng/mL  CARDIAC MARKERS ( 2021 20:05 )  x     / 0.02 ng/mL / 635 U/L / x     / x                                                  -------------------------------------------------------------  RADIOLOGY:                                            --------------------------------------------------------------    PHYSICAL EXAM:  General:   HEENT:  LUNGS:  HEART:  ABDOMEN:  EXT:  NEURO:  SKIN:                                           --------------------------------------------------------------    ASSESSMENT & PLAN    Past medical history and hospital course                                                                                                           ----------------------------------------------------  # DVT prophylaxis     # GI prophylaxis     # Diet     # Activity Score (AM-PAC)    # Code status     # Disposition                                                                              --------------------------------------------------------    # Handoff      EL STEIN 76y Female  MRN#: [id number]   CODE STATUS:________    Hospital Day: 2d    Pt is currently admitted with the primary diagnosis of elevated troponin, and fall.     The night prior to hospitalization, patient received a dose of percocet 10mg and Ambien 10mg at night and then fell on her buttock while walking to the kitchen around 11PM. Before the episode, patient denied any light headedness, chest pain, diaphoresis, or palpitations. During the fall the patient admitted she landed on buttock and denied head trauma, loss of consciousness, or seizure like activity (jerky movements of extremities, incontinence, tongue biting, drooling, uprolling of eyes). She was not able to stand up due to bilateral knee and lower back pain. Daughter found her the next day around 3PM and stated patient was lethargic when she found her.    On review of systems, patient denies recent fever, chills, night sweats, URTI symptoms (cough, rhinorrhea, sore throat), urinary symptoms (urinary frequency, urgency, intermittence, dysuria, foul smelling urine, cloudy urine), change in bowel movements (diarrhea or constipation), abdominal pain, headache, nausea, or vomiting.   No sick contacts.   No recent travel or exposure to recent travelers.    SUBJECTIVE  Hospital Course:  She was brought to the ED on  following a fall. XR of pelvis, CT Head, non-contrast CT C-spine, CT CAP IC showed no pelvic fractures, no acute intracranial process, no cervical fracture of subluxation, and no acute thoracic/abdominal process. Left suprahilar patchy groundglass opacities were seen suggestive of inflammation.    Admitted to  for telemetry monitoring and placement.    Overnight events:   No overnight events.    Subjective complaints:   Pt. complains of severe non-radiating lumbar pain and requests pain medication. Pt has chronic back pain secondary to osteoarthritis and is in process of nerve ablation outpatient. Denies chest pain, SOB, palpitations, diaphoresis, nausea.    Present Today:   - Gomez:  No [  ], Yes [   ] : Indication:     - Type of IV Access:       .. CVC/Piccline:  No [  ], Yes [   ] : Indication:       .. Midline: No [  ], Yes [   ] : Indication:                                             ----------------------------------------------------------  OBJECTIVE  PAST MEDICAL & SURGICAL HISTORY  Asthma    HTN (hypertension)    PVD (peripheral vascular disease)    Breast cancer  LEFT    OA (osteoarthritis)    Pulmonary embolism  reports resolved and not on AC    H/O thyroidectomy  2017    S/P lumpectomy, left breast                                              -----------------------------------------------------------  ALLERGIES:  anesthesia (Anaphylaxis)  esmolol (Anaphylaxis)  rocuronium (Anaphylaxis)                                            ------------------------------------------------------------    HOME MEDICATIONS  Home Medications:  Ambien 10 mg oral tablet: 1 tab(s) orally once a day (at bedtime) (2021 04:37)  aspirin 81 mg oral tablet: 1 tab(s) orally once a day (2021 04:37)  calcitriol 0.5 mcg oral capsule: 1 cap(s) orally once a week (2021 04:37)  levothyroxine 200 mcg (0.2 mg) oral tablet: 1 tab(s) orally once a day (2021 04:37)  montelukast 10 mg oral tablet: 1 tab(s) orally once a day (2021 04:37)  Percocet 10/325 oral tablet: 1 tab(s) orally every 6 hours (2021 04:37)  Symbicort 160 mcg-4.5 mcg/inh inhalation aerosol: 2 puff(s) inhaled 2 times a day (2021 04:37)  Xanax 1 mg oral tablet: 1 tab(s) orally once a day, As Needed (2021 04:37)                           MEDICATIONS:  STANDING MEDICATIONS  aspirin  chewable 81 milliGRAM(s) Oral daily  budesonide 160 MICROgram(s)/formoterol 4.5 MICROgram(s) Inhaler 2 Puff(s) Inhalation two times a day  chlorhexidine 4% Liquid 1 Application(s) Topical <User Schedule>  enoxaparin Injectable 40 milliGRAM(s) SubCutaneous two times a day  levothyroxine 200 MICROGram(s) Oral daily  losartan 25 milliGRAM(s) Oral daily  montelukast 10 milliGRAM(s) Oral daily  pantoprazole    Tablet 40 milliGRAM(s) Oral before breakfast  silver sulfADIAZINE 1% Cream 1 Application(s) Topical daily    PRN MEDICATIONS  acetaminophen    Suspension .. 650 milliGRAM(s) Oral every 6 hours PRN  oxycodone    5 mG/acetaminophen 325 mG 1 Tablet(s) Oral every 6 hours PRN                                            ------------------------------------------------------------  VITAL SIGNS: Last 24 Hours  T(C): 37.1 (2021 05:32), Max: 37.1 (2021 14:06)  T(F): 98.8 (2021 05:32), Max: 98.8 (2021 05:32)  HR: 82 (2021 05:32) (82 - 95)  BP: 128/69 (2021 05:32) (128/63 - 132/61)  BP(mean): --  RR: 18 (2021 05:32) (18 - 18)  SpO2: 97% (2021 06:17) (97% - 97%)      21 @ 07:01  -  21 @ 07:00  --------------------------------------------------------  IN: 440 mL / OUT: 1950 mL / NET: -1510 mL                                             --------------------------------------------------------------  LABS:                        12.2   9.24  )-----------( 130      ( 2021 11:55 )             37.2         139  |  103  |  11  ----------------------------<  83  4.3   |  22  |  0.6<L>    Ca    8.4<L>      2021 11:55  Mg     1.9         TPro  5.9<L>  /  Alb  3.7  /  TBili  1.0  /  DBili  x   /  AST  28  /  ALT  18  /  AlkPhos  69      PT/INR - ( 2021 11:55 )   PT: 13.00 sec;   INR: 1.13 ratio         PTT - ( 2021 11:55 )  PTT:37.8 sec  Urinalysis Basic - ( 2021 18:53 )    Color: Yellow / Appearance: Clear / S.031 / pH: x  Gluc: x / Ketone: Small  / Bili: Negative / Urobili: <2 mg/dL   Blood: x / Protein: Trace / Nitrite: Negative   Leuk Esterase: Negative / RBC: x / WBC x   Sq Epi: x / Non Sq Epi: x / Bacteria: x        Troponin T, Serum: 0.02 ng/mL *H* (21 @ 11:55)  Creatine Kinase, Serum: 396 U/L *H* (21 @ 11:55)          CARDIAC MARKERS ( 2021 11:55 )  x     / 0.02 ng/mL / 396 U/L / x     / 1.8 ng/mL  CARDIAC MARKERS ( 2021 20:05 )  x     / 0.02 ng/mL / 635 U/L / x     / x                                                  -------------------------------------------------------------  RADIOLOGY:  < from: Xray Chest 1 View AP/PA (21 @ 19:29) >  Impression:    No radiographic evidence of acute cardiopulmonary disease.    < end of copied text >    < from: Xray Pelvis AP only (21 @ 19:29) >  Impression: No fractures seen    < end of copied text >    < from: CT Head No Cont (21 @ 21:32) >  Impression: No evidence of intracranial hemorrhage, territorial infarct, or mass effect.    < end of copied text >    < from: CT Cervical Spine No Cont (21 @ 21:32) >  IMPRESSION:  No evidence of a cervical spine fracture or subluxation.    Straightening of the cervical lordosis may be secondary to positioning or muscle spasm.    < end of copied text >    < from: CT Chest w/ IV Cont (21 @ 21:33) >  IMPRESSION:  No evidence of acute traumatic intrathoracic or intra-abdominal pathology.    < end of copied text >    < from: CT Abdomen and Pelvis w/ IV Cont (21 @ 21:33) >  IMPRESSION:  No evidence of acute traumatic intrathoracic or intra-abdominal pathology.    < end of copied text >    < from: [facility] Duplex Lower Ext Vein Scan, Bilat (21 @ 09:34) >  Impression:    No evidence of deep venous thrombosis or superficial thrombophlebitis in the bilateral lower extremities.    < end of copied text >                                            --------------------------------------------------------------    PHYSICAL EXAM:  General: Acute distress, crying  HEENT: Normocephalic, atraumatic  LUNGS: normal air entry bilaterally  HEART: Normal rate, rhythm, S1S2  ABDOMEN: soft, non-distended, non-tender, +BS  EXT: swollen and edematous, no cyanosis or clubbing  NEURO: no focal deficits or weakness  SKIN: no rashes or ecchymosis                                         --------------------------------------------------------------    ASSESSMENT & PLAN    Case of a 76 year old female patient with HTN, DL, OA, Asthma, Hypothyroidism, history of breast cancer, and PE who was brought to the ED on  following a fall after a dose of percocet and ambien, found to have negative trauma workup, admitted to telemetry for monitoring and for placement. Currently hemodynamically stable.    Likely Mechanical Fall in Setting of Percocet and Ambien Doses  * No history of recurrent falls  *  ()  ()  *CKMB 1.8 ()  * Troponin 0.02 x1   * Serum Alcohol <10  * XR pelvis () no pelvic fracture  * CT H without contrast () no acute intracranial process  * CT C-spine wo contrast () no fracture or subluxation  * CT CAP IC () no acute intra thoracic or abdominal process, left suprahilar patchy groundglass opacities suggestive of inflammation  * ECG in ED noted    - Avoid sedatives unless truly needed  - Follow up orthostatic vital signs to rule out orthostatic hypotension  - Monitor Telemetry on 3C  - TTE performed (): [id number]%, mild-mod TR, Pulmonary hypertension (systolic 50.2 mmHg).       --Last TTE 19 trace OR, mod AR, mild-mod TR  - Trend cardiac enzymes: troponin 0.02  -> follow up repeat troponin and CK + CKMB  AM  - No need for EEG since seizure is low on differential  - Physical Therapy/ OT consulted: may need bathroom equip, anticipated d/c rehab facility; PT encourage pt to change position frequently t/o the day      -Planned therapy: balance training; bed mobility training; gait training; transfer training; strengthening    History of Subsegmental PE at bifurcation of posterior and lateral subsegment of RLL ()  Venous Insufficiency and Stasis Dermatitis of Lower Extremities  * Pulmonologist Dr Mimi Hickey  * Subsegmental PE at bifurcation of posterior and lateral subsegment of RLL on 2019.   * Currently off anticoagulation (was on eliquis 5mg BID until end 2019)  * Last TTE 19 trace OR, mod AR, mild-mod TR  * On room air with SaO2 100%    - Will start Lovenox 40mg QD for DVT Prophylaxis (was on eliquis 5mg BID until end 2019)  - Monitor SaO2: currently on RA with SaO2 100%  - venous duplex of LE: No evidence of DVT or superficial thrombophlebitis in the bilateral lower extremities.  - D-dimer  AM Quantitative: 3292  - For stasis dermatitis, apply Silvadene gel twice daily  - Outpatient follow up with Dr Mcgregor       Left Suprahilar Patchy Ground Glass Opacity   * Likely inflammatory  * No fever  * No leukocytosis  * On room air with SaO2 100%  - In absence of WBC, fevers, or oxygen requirements, infection is less likely  -Chest x ray () impression: No radiographic evidence of acute cardiopulmonary disease.    Hypertension  * Home medication Losartan 25mg QD  * ED Vital Signs: /97 mmHg    - Monitor BP closely  - Continue Losartan 25mg QD    History of Left Sided Ductal Breast Carcinoma   * s/p left sided Lumpectomy  * Follows with Dr Flores    - Outpatient follow up with Dr Flores as needed    Asthma  * Controlled  * Home med Montelukast 10mg QD, Symbicort 160-4.5 2 puffs BID , Epi pen    - Continue Montelukast 10,g QD and Symbicort  - Outpatient follow up with Dr Euceda as needed    Bilateral Knee Osteoarthritis and Back  * Pain Specialist Dr Thomason s/p epidural with no improvement.   * Home medication Oxycodone 10mg every 6 hours (additional 5 allowed in between)    - Monitor pain and keep score at 01/10  - Start Tylenol 650mg Q6h PRN for pain control   - Consider pain team consult in case of no improvement in pain. There was a plan for nerve block in case of persistence of pain  - Physical Therapy/ OT consulted    Hypothyroidism  * s/p Thyroidectomy in   * Last TSH 1.95 2019  * Home medication Levothyroxine 200 mcg QD  - Follow up TSH   - Continue Levothyroxine 200 mcg QD                                                                                    ----------------------------------------------------  # DVT prophylaxis  Lovenox 40mg Subcutaneously daily    # GI prophylaxis Pantoprazole 40mg PO QD    # Diet DASH/TLC    # Activity Score (AM-PAC)    # Code status Full    # Disposition                                                                              --------------------------------------------------------    Elkin Bertrand      EL STEIN 76y Female  MRN#: [id number]   CODE STATUS:________    Hospital Day: 2d    Pt is currently admitted with the primary diagnosis of elevated troponin, and fall.       SUBJECTIVE  Hospital Course:  The night prior to hospitalization, patient received a dose of percocet 10mg and Ambien 10mg at night and then fell on her buttock while walking to the kitchen around 11PM. Before the episode, patient denied any light headedness, chest pain, diaphoresis, or palpitations. During the fall the patient admitted she landed on buttock and denied head trauma, loss of consciousness, or seizure like activity (jerky movements of extremities, incontinence, tongue biting, drooling, uprolling of eyes). She was not able to stand up due to bilateral knee and lower back pain. Daughter found her the next day around 3PM and stated patient was lethargic when she found her.    On review of systems, patient denies recent fever, chills, night sweats, URTI symptoms (cough, rhinorrhea, sore throat), urinary symptoms (urinary frequency, urgency, intermittence, dysuria, foul smelling urine, cloudy urine), change in bowel movements (diarrhea or constipation), abdominal pain, headache, nausea, or vomiting. No sick contacts. No recent travel or exposure to recent travelers.    She was brought to the ED on  following a fall. XR of pelvis, CT Head, non-contrast CT C-spine, CT CAP IC showed no pelvic fractures, no acute intracranial process, no cervical fracture of subluxation, and no acute thoracic/abdominal process. Left suprahilar patchy groundglass opacities were seen suggestive of inflammation.    Admitted to  for telemetry monitoring and placement.    Overnight events:   No overnight events.    Subjective complaints:   Pt. complains of severe non-radiating lumbar pain and requests pain medication. Pt has chronic back pain secondary to osteoarthritis and is in process of nerve ablation outpatient. Denies chest pain, SOB, palpitations, diaphoresis, nausea.    Present Today:   - Gomez:  No [  ], Yes [   ] : Indication:     - Type of IV Access:       .. CVC/Piccline:  No [  ], Yes [   ] : Indication:       .. Midline: No [  ], Yes [   ] : Indication:                                             ----------------------------------------------------------  OBJECTIVE  PAST MEDICAL & SURGICAL HISTORY  Asthma    HTN (hypertension)    PVD (peripheral vascular disease)    Breast cancer  LEFT    OA (osteoarthritis)    Pulmonary embolism  reports resolved and not on AC    H/O thyroidectomy  2017    S/P lumpectomy, left breast                                              -----------------------------------------------------------  ALLERGIES:  anesthesia (Anaphylaxis)  esmolol (Anaphylaxis)  rocuronium (Anaphylaxis)                                            ------------------------------------------------------------    HOME MEDICATIONS  Home Medications:  Ambien 10 mg oral tablet: 1 tab(s) orally once a day (at bedtime) (2021 04:37)  aspirin 81 mg oral tablet: 1 tab(s) orally once a day (2021 04:37)  calcitriol 0.5 mcg oral capsule: 1 cap(s) orally once a week (2021 04:37)  levothyroxine 200 mcg (0.2 mg) oral tablet: 1 tab(s) orally once a day (2021 04:37)  montelukast 10 mg oral tablet: 1 tab(s) orally once a day (2021 04:37)  Percocet 10/325 oral tablet: 1 tab(s) orally every 6 hours (2021 04:37)  Symbicort 160 mcg-4.5 mcg/inh inhalation aerosol: 2 puff(s) inhaled 2 times a day (2021 04:37)  Xanax 1 mg oral tablet: 1 tab(s) orally once a day, As Needed (2021 04:37)                           MEDICATIONS:  STANDING MEDICATIONS  aspirin  chewable 81 milliGRAM(s) Oral daily  budesonide 160 MICROgram(s)/formoterol 4.5 MICROgram(s) Inhaler 2 Puff(s) Inhalation two times a day  chlorhexidine 4% Liquid 1 Application(s) Topical <User Schedule>  enoxaparin Injectable 40 milliGRAM(s) SubCutaneous two times a day  levothyroxine 200 MICROGram(s) Oral daily  losartan 25 milliGRAM(s) Oral daily  montelukast 10 milliGRAM(s) Oral daily  pantoprazole    Tablet 40 milliGRAM(s) Oral before breakfast  silver sulfADIAZINE 1% Cream 1 Application(s) Topical daily    PRN MEDICATIONS  acetaminophen    Suspension .. 650 milliGRAM(s) Oral every 6 hours PRN  oxycodone    5 mG/acetaminophen 325 mG 1 Tablet(s) Oral every 6 hours PRN                                            ------------------------------------------------------------  VITAL SIGNS: Last 24 Hours  T(C): 37.1 (2021 05:32), Max: 37.1 (2021 14:06)  T(F): 98.8 (2021 05:32), Max: 98.8 (2021 05:32)  HR: 82 (2021 05:32) (82 - 95)  BP: 128/69 (2021 05:32) (128/63 - 132/61)  BP(mean): --  RR: 18 (2021 05:32) (18 - 18)  SpO2: 97% (2021 06:17) (97% - 97%)      21 @ 07:01  -  21 @ 07:00  --------------------------------------------------------  IN: 440 mL / OUT: 1950 mL / NET: -1510 mL                                             --------------------------------------------------------------  LABS:                        12.2   9.24  )-----------( 130      ( 2021 11:55 )             37.2         139  |  103  |  11  ----------------------------<  83  4.3   |  22  |  0.6<L>    Ca    8.4<L>      2021 11:55  Mg     1.9         TPro  5.9<L>  /  Alb  3.7  /  TBili  1.0  /  DBili  x   /  AST  28  /  ALT  18  /  AlkPhos  69      PT/INR - ( 2021 11:55 )   PT: 13.00 sec;   INR: 1.13 ratio         PTT - ( 2021 11:55 )  PTT:37.8 sec  Urinalysis Basic - ( 2021 18:53 )    Color: Yellow / Appearance: Clear / S.031 / pH: x  Gluc: x / Ketone: Small  / Bili: Negative / Urobili: <2 mg/dL   Blood: x / Protein: Trace / Nitrite: Negative   Leuk Esterase: Negative / RBC: x / WBC x   Sq Epi: x / Non Sq Epi: x / Bacteria: x        Troponin T, Serum: 0.02 ng/mL *H* (21 @ 11:55)  Creatine Kinase, Serum: 396 U/L *H* (21 @ 11:55)          CARDIAC MARKERS ( 2021 11:55 )  x     / 0.02 ng/mL / 396 U/L / x     / 1.8 ng/mL  CARDIAC MARKERS ( 2021 20:05 )  x     / 0.02 ng/mL / 635 U/L / x     / x                                                  -------------------------------------------------------------  RADIOLOGY:  < from: Xray Chest 1 View AP/PA (21 @ 19:29) >  Impression:    No radiographic evidence of acute cardiopulmonary disease.    < end of copied text >    < from: Xray Pelvis AP only (21 @ 19:29) >  Impression: No fractures seen    < end of copied text >    < from: CT Head No Cont (21 @ 21:32) >  Impression: No evidence of intracranial hemorrhage, territorial infarct, or mass effect.    < end of copied text >    < from: CT Cervical Spine No Cont (21 @ 21:32) >  IMPRESSION:  No evidence of a cervical spine fracture or subluxation.    Straightening of the cervical lordosis may be secondary to positioning or muscle spasm.    < end of copied text >    < from: CT Chest w/ IV Cont (21 @ 21:33) >  IMPRESSION:  No evidence of acute traumatic intrathoracic or intra-abdominal pathology.    < end of copied text >    < from: CT Abdomen and Pelvis w/ IV Cont (21 @ 21:33) >  IMPRESSION:  No evidence of acute traumatic intrathoracic or intra-abdominal pathology.    < end of copied text >    < from: [facility] Duplex Lower Ext Vein Scan, Bilat (21 @ 09:34) >  Impression:    No evidence of deep venous thrombosis or superficial thrombophlebitis in the bilateral lower extremities.    < end of copied text >                                            --------------------------------------------------------------    PHYSICAL EXAM:  General: Acute distress, crying  HEENT: Normocephalic, atraumatic  LUNGS: normal air entry bilaterally  HEART: Normal rate, rhythm, S1S2  ABDOMEN: soft, non-distended, non-tender, +BS  EXT: swollen and edematous, no cyanosis or clubbing  NEURO: no focal deficits or weakness  SKIN: no rashes or ecchymosis                                         --------------------------------------------------------------    ASSESSMENT & PLAN    Case of a 76 year old female patient with HTN, DL, OA, Asthma, Hypothyroidism, history of breast cancer, and PE who was brought to the ED on  following a fall after a dose of percocet and ambien, found to have negative trauma workup, admitted to telemetry for monitoring and for placement. Currently hemodynamically stable.    Likely Mechanical Fall in Setting of Percocet and Ambien Doses  * No history of recurrent falls  *  ()  ()  *CKMB 1.8 ()  * Troponin 0.02 x1   * Serum Alcohol <10  * XR pelvis () no pelvic fracture  * CT H without contrast () no acute intracranial process  * CT C-spine wo contrast () no fracture or subluxation  * CT CAP IC () no acute intra thoracic or abdominal process, left suprahilar patchy groundglass opacities suggestive of inflammation  * ECG in ED noted    - Avoid sedatives unless truly needed  - Follow up orthostatic vital signs to rule out orthostatic hypotension  - Monitor Telemetry on 3C  - TTE performed (): [id number]%, mild-mod TR, Pulmonary hypertension (systolic 50.2 mmHg).       --Last TTE 19 trace MD, mod AR, mild-mod TR  - Trend cardiac enzymes: troponin 0.02  -> follow up repeat troponin and CK + CKMB  AM  - No need for EEG since seizure is low on differential  - Physical Therapy/ OT consulted: may need bathroom equip, anticipated d/c rehab facility; PT encourage pt to change position frequently t/o the day      -Planned therapy: balance training; bed mobility training; gait training; transfer training; strengthening    History of Subsegmental PE at bifurcation of posterior and lateral subsegment of RLL ()  Venous Insufficiency and Stasis Dermatitis of Lower Extremities  * Pulmonologist Dr Mimi Hickey  * Subsegmental PE at bifurcation of posterior and lateral subsegment of RLL on 2019.   * Currently off anticoagulation (was on eliquis 5mg BID until end 2019)  * Last TTE 19 trace MD, mod AR, mild-mod TR  * On room air with SaO2 100%    - Will start Lovenox 40mg QD for DVT Prophylaxis (was on eliquis 5mg BID until end 2019)  - Monitor SaO2: currently on RA with SaO2 100%  - venous duplex of LE: No evidence of DVT or superficial thrombophlebitis in the bilateral lower extremities.  - D-dimer  AM Quantitative: 3292  - For stasis dermatitis, apply Silvadene gel twice daily  - Outpatient follow up with Dr Mcgregor       Left Suprahilar Patchy Ground Glass Opacity   * Likely inflammatory  * No fever  * No leukocytosis  * On room air with SaO2 100%  - In absence of WBC, fevers, or oxygen requirements, infection is less likely  -Chest x ray () impression: No radiographic evidence of acute cardiopulmonary disease.    Hypertension  * Home medication Losartan 25mg QD  * ED Vital Signs: /97 mmHg    - Monitor BP closely  - Continue Losartan 25mg QD    History of Left Sided Ductal Breast Carcinoma   * s/p left sided Lumpectomy  * Follows with Dr Flores    - Outpatient follow up with Dr Flores as needed    Asthma  * Controlled  * Home med Montelukast 10mg QD, Symbicort 160-4.5 2 puffs BID , Epi pen    - Continue Montelukast 10,g QD and Symbicort  - Outpatient follow up with Dr Euceda as needed    Bilateral Knee Osteoarthritis and Back  * Pain Specialist Dr Thomason s/p epidural with no improvement.   * Home medication Oxycodone 10mg every 6 hours (additional 5 allowed in between)    - Monitor pain and keep score at 01/10  - Start Tylenol 650mg Q6h PRN for pain control   - Consider pain team consult in case of no improvement in pain. There was a plan for nerve block in case of persistence of pain  - Physical Therapy/ OT consulted    Hypothyroidism  * s/p Thyroidectomy in   * Last TSH 1.95 2019  * Home medication Levothyroxine 200 mcg QD  - Follow up TSH   - Continue Levothyroxine 200 mcg QD                                                                                    ----------------------------------------------------  # DVT prophylaxis  Lovenox 40mg Subcutaneously daily    # GI prophylaxis Pantoprazole 40mg PO QD    # Diet DASH/TLC    # Activity Score (AM-PAC)    # Code status Full    # Disposition                                                                              --------------------------------------------------------    Elkin Bertrand

## 2021-07-26 NOTE — PHYSICAL THERAPY INITIAL EVALUATION ADULT - PERTINENT HX OF CURRENT PROBLEM, REHAB EVAL
pt adm for elevated troponin, s/p fall, as per dtr, pt has rec cortisone knee injections, and is in the process of nerve ablation for back pain

## 2021-07-26 NOTE — PHYSICAL THERAPY INITIAL EVALUATION ADULT - GENERAL OBSERVATIONS, REHAB EVAL
9:25-10:05 40 min  pt rec in bed in NAD, dtr at the b/s, pt agreeable to try PT, pt c/o 10/10 back pain and pain B knees with movement, pt rec Percocet already tis AM

## 2021-07-26 NOTE — OCCUPATIONAL THERAPY INITIAL EVALUATION ADULT - PLANNED THERAPY INTERVENTIONS, OT EVAL
ADL retraining/balance training/bed mobility training/parent/caregiver training.../ROM/strengthening/stretching/transfer training

## 2021-07-26 NOTE — CHART NOTE - NSCHARTNOTEFT_GEN_A_CORE
She was brought to the ED on 07/25 following a fall. History goes back to one night prior to presentation when the patient received a dose of percocet 10mg and Ambien 10mg at night. She then was on her way to the kitchen when she fell on her buttock. Fall was not witnessed but by time daughter arrived, she was found on the floor (but not sure for how long she was on the floor). Before the episode, patient denied any light headedness, chest pain, diaphoresis, or palpitations. During the episode, patient denies any head trauma, loss of consciousness, seizure like activity (jerky movements of extremities, incontinence, tongue biting, drooling, uprolling of eyes) but she was not able to stand up due to bilateral knee and lower back pain. After the episode, patient was not confused and was at baseline mentation.    It is medically necessary this patient receive a bonilla lift. This pt is a two person max assist and will need this bonilla lift for transfers from bed to chair. She was brought to the ED on 07/25 following a fall. History goes back to one night prior to presentation when the patient received a dose of percocet 10mg and Ambien 10mg at night. She then was on her way to the kitchen when she fell on her buttock. Fall was not witnessed but by time daughter arrived, she was found on the floor (but not sure for how long she was on the floor). Before the episode, patient denied any light headedness, chest pain, diaphoresis, or palpitations. During the episode, patient denies any head trauma, loss of consciousness, seizure like activity (jerky movements of extremities, incontinence, tongue biting, drooling, uprolling of eyes) but she was not able to stand up due to bilateral knee and lower back pain. After the episode, patient was not confused and was at baseline mentation.    It is medically necessary this patient receive a bariatric bonilla lift. This pt is a two person max assist and will need this bariatric bonilla lift for transfers from bed to chair.

## 2021-07-26 NOTE — OCCUPATIONAL THERAPY INITIAL EVALUATION ADULT - NS ASR OT EQUIP NEEDS DISCH
dtr made aware of pt's high level of needs- OT ed. pt on safety re: d/c home and need for DME dtr aware and agreeable, OT educated pt on benefit/role of rehab post d/c from hospital/bathing/bedside commode/patient lift/wheelchair

## 2021-07-26 NOTE — OCCUPATIONAL THERAPY INITIAL EVALUATION ADULT - GENERAL OBSERVATIONS, REHAB EVAL
Pt received semi trevino in bed in NAD, agreeable to OT evaluation, +IV lock, left semi trevino in bed all lines intact, RN aware, dtr present throughout eval

## 2021-07-26 NOTE — OCCUPATIONAL THERAPY INITIAL EVALUATION ADULT - LEVEL OF INDEPENDENCE: SUPINE/SIT, REHAB EVAL
attempted to perform dupine to sit, pt with 10/10 back pain upon attempt and achieve 3/4 and then was returned to bed/dependent (less than 25% patients effort)

## 2021-07-26 NOTE — CONSULT NOTE ADULT - SUBJECTIVE AND OBJECTIVE BOX
HPI:  History of Present Illness    Ms. Callahan is a 76 year old female patient known to have:  - Baseline: lives alone (daughter lives 2 minutes away), alert and oriented to time, place, and person, ambulates with roll aider, Ambien 10mg at bedtime and Xanax 1mg PRN  - Hypertension. Home medication Losartan 25mg QD  - Venous Insufficiency s/p venous graft. 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 Pulmonologist Dr Mimi Hickey. Currently off anticoagulation (was on eliquis 5mg BID). Last TTE 04/29/19 trace MT, mod AR, mild-mod TR   - History of Left Sided Ductal Breast Carcinoma s/p left sided Lumpectomy. Follows with Dr Flores  - Asthma. Controlled. Home med Montelukast 10mg QD, Symbicort 160-4.5 2 puffs BID , Epi pen  - Bilateral Knee Osteoarthritis and back. Pain Specialist Dr Thomason s/p epidural with no improvement.  There was a plan for nerve block in case of persistence of pain. Home medication Oxycodone 10mg every 6 hours (additional 5 allowed in between)  - Hypothyroidism s/p Thyroidectomy in 2017. Last TSH 1.95 05/09/2019. Home medication Levothyroxine 200 mcg QD  - GERD. Home med Protonix 40mg QD  - On Aspirin 81mg QD for primary prophylaxis    She was brought to the ED on 07/25 following a fall.  History goes back to one night prior to presentation when the patient received a dose of percocet 10mg and Ambien 10mg at night.  She then was on her way to the kitchen when she fell on her buttock.  Fall was not witnessed but by time daughter arrived, she was found on the floor (but not sure for how long she was on the floor).  Before the episode, patient denied any light headedness, chest pain, diaphoresis, or palpitations.  During the episode, patient denies any head trauma, loss of consciousness, seizure like activity (jerky movements of extremities, incontinence, tongue biting, drooling, uprolling of eyes) but she was not able to stand up due to bilateral knee and lower back pain.  After the episode, patient was not confused and was at baseline mentation.    On review of systems, patient denies any recent fever, chills, night sweats, URTI symptoms (cough, rhinorrhea, sore throat), urinary symptoms (urinary frequency, urgency, intermittence, dysuria, foul smelling urine, cloudy urine), change in bowel movements (diarrhea or constipation), abdominal pain, headache, nausea, or vomiting.   No sick contacts.   No recent travel or exposure to recent travelers.      Upon presentation to the ED, Vital Signs:  - /97 mmHg  - HR 98 bpm  - RR 16 bpm  - T 99.7  - SaO2 100% on RA      Investigations in ED  - CBC  --> WBC 10.89, Hb 13.0, Plt 157    - Chemistry  --> Na 137, K 3.9. AG 12, BUN 14, Cr 0.7, Ca 8.9  --> T bili 0.9, ALP 77, AST 33, ALT 22, Lipase 12  -->   --> Troponin 0.02 x1   --> Serum Alcohol <10    - Imaging  --> XR pelvis (07/25) no pelvic fracture  --> CT H without contrast (07/25) no acute intracranial process  --> CT C-spine wo contrast (07/25) no fracture or subluxation  --> CT CAP IC (07/25) no acute intra thoracic or abdominal process, left suprahilar patchy groundglass opacities suggestive of inflammation    - Microbiology  --> COVID PCR negative 07/25  --> Urinalysis (07/25) negative  --> Urine culture (07/25) received      Patient was admitted to  for telemetry monitoring and placement.   (25 Jul 2021 02:29)    Pain Hx:     Allergies    anesthesia (Anaphylaxis)  esmolol (Anaphylaxis)  rocuronium (Anaphylaxis)    Intolerances    MEDICATIONS  (STANDING):  aspirin  chewable 81 milliGRAM(s) Oral daily  budesonide 160 MICROgram(s)/formoterol 4.5 MICROgram(s) Inhaler 2 Puff(s) Inhalation two times a day  chlorhexidine 4% Liquid 1 Application(s) Topical <User Schedule>  enoxaparin Injectable 40 milliGRAM(s) SubCutaneous two times a day  levothyroxine 200 MICROGram(s) Oral daily  losartan 25 milliGRAM(s) Oral daily  montelukast 10 milliGRAM(s) Oral daily  pantoprazole    Tablet 40 milliGRAM(s) Oral before breakfast  silver sulfADIAZINE 1% Cream 1 Application(s) Topical daily    MEDICATIONS  (PRN):  acetaminophen    Suspension .. 650 milliGRAM(s) Oral every 6 hours PRN Moderate Pain (4 - 6)  oxycodone    5 mG/acetaminophen 325 mG 1 Tablet(s) Oral every 6 hours PRN Severe Pain (7 - 10)    07-26    140  |  104  |  11  ----------------------------<  125<H>  4.0   |  24  |  0.7    Ca    8.3<L>      26 Jul 2021 17:10  Mg     1.9     07-25    TPro  5.9<L>  /  Alb  3.7  /  TBili  1.0  /  DBili  x   /  AST  28  /  ALT  18  /  AlkPhos  69  07-25                          11.6   7.43  )-----------( 146      ( 26 Jul 2021 17:10 )             36.3    HPI:  History of Present Illness    Ms. Callahan is a 76 year old female patient known to have:  - Baseline: lives alone (daughter lives 2 minutes away), alert and oriented to time, place, and person, ambulates with roll aider, Ambien 10mg at bedtime and Xanax 1mg PRN  - Hypertension. Home medication Losartan 25mg QD  - Venous Insufficiency s/p venous graft. 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 Pulmonologist Dr Mimi Hickey. Currently off anticoagulation (was on eliquis 5mg BID). Last TTE 04/29/19 trace OH, mod AR, mild-mod TR   - History of Left Sided Ductal Breast Carcinoma s/p left sided Lumpectomy. Follows with Dr Flores  - Asthma. Controlled. Home med Montelukast 10mg QD, Symbicort 160-4.5 2 puffs BID , Epi pen  - Bilateral Knee Osteoarthritis and back. Pain Specialist Dr Thomason s/p epidural with no improvement.  There was a plan for nerve block in case of persistence of pain. Home medication Oxycodone 10mg every 6 hours (additional 5 allowed in between)  - Hypothyroidism s/p Thyroidectomy in 2017. Last TSH 1.95 05/09/2019. Home medication Levothyroxine 200 mcg QD  - GERD. Home med Protonix 40mg QD  - On Aspirin 81mg QD for primary prophylaxis    She was brought to the ED on 07/25 following a fall.  History goes back to one night prior to presentation when the patient received a dose of percocet 10mg and Ambien 10mg at night.  She then was on her way to the kitchen when she fell on her buttock.  Fall was not witnessed but by time daughter arrived, she was found on the floor (but not sure for how long she was on the floor).  Before the episode, patient denied any light headedness, chest pain, diaphoresis, or palpitations.  During the episode, patient denies any head trauma, loss of consciousness, seizure like activity (jerky movements of extremities, incontinence, tongue biting, drooling, uprolling of eyes) but she was not able to stand up due to bilateral knee and lower back pain.  After the episode, patient was not confused and was at baseline mentation.    On review of systems, patient denies any recent fever, chills, night sweats, URTI symptoms (cough, rhinorrhea, sore throat), urinary symptoms (urinary frequency, urgency, intermittence, dysuria, foul smelling urine, cloudy urine), change in bowel movements (diarrhea or constipation), abdominal pain, headache, nausea, or vomiting.   No sick contacts.   No recent travel or exposure to recent travelers.      Upon presentation to the ED, Vital Signs:  - /97 mmHg  - HR 98 bpm  - RR 16 bpm  - T 99.7  - SaO2 100% on RA      Investigations in ED  - CBC  --> WBC 10.89, Hb 13.0, Plt 157    - Chemistry  --> Na 137, K 3.9. AG 12, BUN 14, Cr 0.7, Ca 8.9  --> T bili 0.9, ALP 77, AST 33, ALT 22, Lipase 12  -->   --> Troponin 0.02 x1   --> Serum Alcohol <10    - Imaging  --> XR pelvis (07/25) no pelvic fracture  --> CT H without contrast (07/25) no acute intracranial process  --> CT C-spine wo contrast (07/25) no fracture or subluxation  --> CT CAP IC (07/25) no acute intra thoracic or abdominal process, left suprahilar patchy groundglass opacities suggestive of inflammation    - Microbiology  --> COVID PCR negative 07/25  --> Urinalysis (07/25) negative  --> Urine culture (07/25) received      Patient was admitted to  for telemetry monitoring and placement.   (25 Jul 2021 02:29)    Pain Hx: Istop reviewed ([id number]) oxycodone/acetaminophen 10/325mg QID PRN and amvien 10mg QHS PRN managed by Dr. Harley    Allergies    anesthesia (Anaphylaxis)  esmolol (Anaphylaxis)  rocuronium (Anaphylaxis)    Intolerances    MEDICATIONS  (STANDING):  aspirin  chewable 81 milliGRAM(s) Oral daily  budesonide 160 MICROgram(s)/formoterol 4.5 MICROgram(s) Inhaler 2 Puff(s) Inhalation two times a day  chlorhexidine 4% Liquid 1 Application(s) Topical <User Schedule>  enoxaparin Injectable 40 milliGRAM(s) SubCutaneous two times a day  levothyroxine 200 MICROGram(s) Oral daily  losartan 25 milliGRAM(s) Oral daily  montelukast 10 milliGRAM(s) Oral daily  pantoprazole    Tablet 40 milliGRAM(s) Oral before breakfast  silver sulfADIAZINE 1% Cream 1 Application(s) Topical daily    MEDICATIONS  (PRN):  acetaminophen    Suspension .. 650 milliGRAM(s) Oral every 6 hours PRN Moderate Pain (4 - 6)  oxycodone    5 mG/acetaminophen 325 mG 1 Tablet(s) Oral every 6 hours PRN Severe Pain (7 - 10)    07-26    140  |  104  |  11  ----------------------------<  125<H>  4.0   |  24  |  0.7    Ca    8.3<L>      26 Jul 2021 17:10  Mg     1.9     07-25    TPro  5.9<L>  /  Alb  3.7  /  TBili  1.0  /  DBili  x   /  AST  28  /  ALT  18  /  AlkPhos  69  07-25                          11.6   7.43  )-----------( 146      ( 26 Jul 2021 17:10 )             36.3    HPI:  History of Present Illness    Ms. Callahan is a 76 year old female patient known to have:  - Baseline: lives alone (daughter lives 2 minutes away), alert and oriented to time, place, and person, ambulates with roll aider, Ambien 10mg at bedtime and Xanax 1mg PRN  - Hypertension. Home medication Losartan 25mg QD  - Venous Insufficiency s/p venous graft. 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 Pulmonologist Dr Mimi Hickey. Currently off anticoagulation (was on eliquis 5mg BID). Last TTE 04/29/19 trace HI, mod AR, mild-mod TR   - History of Left Sided Ductal Breast Carcinoma s/p left sided Lumpectomy. Follows with Dr Flores  - Asthma. Controlled. Home med Montelukast 10mg QD, Symbicort 160-4.5 2 puffs BID , Epi pen  - Bilateral Knee Osteoarthritis and back. Pain Specialist Dr Thomason s/p epidural with no improvement.  There was a plan for nerve block in case of persistence of pain. Home medication Oxycodone 10mg every 6 hours (additional 5 allowed in between)  - Hypothyroidism s/p Thyroidectomy in 2017. Last TSH 1.95 05/09/2019. Home medication Levothyroxine 200 mcg QD  - GERD. Home med Protonix 40mg QD  - On Aspirin 81mg QD for primary prophylaxis    She was brought to the ED on 07/25 following a fall.  History goes back to one night prior to presentation when the patient received a dose of percocet 10mg and Ambien 10mg at night.  She then was on her way to the kitchen when she fell on her buttock.  Fall was not witnessed but by time daughter arrived, she was found on the floor (but not sure for how long she was on the floor).  Before the episode, patient denied any light headedness, chest pain, diaphoresis, or palpitations.  During the episode, patient denies any head trauma, loss of consciousness, seizure like activity (jerky movements of extremities, incontinence, tongue biting, drooling, uprolling of eyes) but she was not able to stand up due to bilateral knee and lower back pain.  After the episode, patient was not confused and was at baseline mentation.    On review of systems, patient denies any recent fever, chills, night sweats, URTI symptoms (cough, rhinorrhea, sore throat), urinary symptoms (urinary frequency, urgency, intermittence, dysuria, foul smelling urine, cloudy urine), change in bowel movements (diarrhea or constipation), abdominal pain, headache, nausea, or vomiting.   No sick contacts.   No recent travel or exposure to recent travelers.      Upon presentation to the ED, Vital Signs:  - /97 mmHg  - HR 98 bpm  - RR 16 bpm  - T 99.7  - SaO2 100% on RA      Investigations in ED  - CBC  --> WBC 10.89, Hb 13.0, Plt 157    - Chemistry  --> Na 137, K 3.9. AG 12, BUN 14, Cr 0.7, Ca 8.9  --> T bili 0.9, ALP 77, AST 33, ALT 22, Lipase 12  -->   --> Troponin 0.02 x1   --> Serum Alcohol <10    - Imaging  --> XR pelvis (07/25) no pelvic fracture  --> CT H without contrast (07/25) no acute intracranial process  --> CT C-spine wo contrast (07/25) no fracture or subluxation  --> CT CAP IC (07/25) no acute intra thoracic or abdominal process, left suprahilar patchy groundglass opacities suggestive of inflammation    - Microbiology  --> COVID PCR negative 07/25  --> Urinalysis (07/25) negative  --> Urine culture (07/25) received      Patient was admitted to  for telemetry monitoring and placement.   (25 Jul 2021 02:29)    Pain Hx: Istop reviewed ([id number]) chronic oxycodone/acetaminophen 10/325mg QID PRN and ambien 10mg QHS PRN managed by Dr. Harley for the treatment of chronic low back and right knee pain. Pain level: 7/10. Denies any radicular pain into lower extremities. Intermittent spasms shoots horizontally across the low back. The patient stated that she did not have any issues with home dose pain medications. The mechanical fall was an accident. Denies LOC.    Allergies    anesthesia (Anaphylaxis)  esmolol (Anaphylaxis)  rocuronium (Anaphylaxis)    Intolerances    MEDICATIONS  (STANDING):  aspirin  chewable 81 milliGRAM(s) Oral daily  budesonide 160 MICROgram(s)/formoterol 4.5 MICROgram(s) Inhaler 2 Puff(s) Inhalation two times a day  chlorhexidine 4% Liquid 1 Application(s) Topical <User Schedule>  enoxaparin Injectable 40 milliGRAM(s) SubCutaneous two times a day  levothyroxine 200 MICROGram(s) Oral daily  losartan 25 milliGRAM(s) Oral daily  montelukast 10 milliGRAM(s) Oral daily  pantoprazole    Tablet 40 milliGRAM(s) Oral before breakfast  silver sulfADIAZINE 1% Cream 1 Application(s) Topical daily    MEDICATIONS  (PRN):  acetaminophen    Suspension .. 650 milliGRAM(s) Oral every 6 hours PRN Moderate Pain (4 - 6)  oxycodone    5 mG/acetaminophen 325 mG 1 Tablet(s) Oral every 6 hours PRN Severe Pain (7 - 10)    EXAM:  XR CHEST PORTABLE ROUTINE 1V            PROCEDURE DATE:  07/26/2021            INTERPRETATION:  Clinical History / Reason for exam: Fall    Comparison : Chest radiograph 7/24/2021.    Technique/Positioning: Chest x-ray, frontal 1 view.    Findings:    Support devices: None.    Cardiac/mediastinum/hilum: Stable.    Lung parenchyma/Pleura: Low lung volumes. No focal consolidation. No visualized pneumothorax..    Skeleton/soft tissues: Stable.    Impression:    Low lung volumes. No focal consolidation.    --- End of Report ---            WING ECHAVARRIA MD; Resident Radiologist  This document has been electronically signed.  TIFFANY BUNN MD; Attending Radiologist  This document has been electronically signed. Jul 26 2021 12:52PM    EXAM:  CT BRAIN            PROCEDURE DATE:  07/24/2021            INTERPRETATION:  Clinical History/Reason For Exam: Trauma.    Technique: Multiple contiguous axial CT images were obtained from the base of the skull to the vertex without administration of intravenous contrast. Axial, coronal and sagittal images were reviewed.    No studies are available for direct comparison.    Findings:    The ventricles, basal cisterns and sulcal pattern are within normal limits for the patient's stated age.    The gray-white matter differentiation is preserved. There are bilateral basal ganglia calcifications.    Patchy hypodensities are seen in the periventricular and subcortical white matter, nonspecific and without mass-effect, and may represent areas of microvascular change    There is no acute mass effect, midline shift or intracranial hemorrhage.    The imaged paranasal sinuses and bilateral mastoid complexes are unremarkable.    No evidence of a depressed skull fracture.    Right temporoparietal scalp hematoma.    Beam hardening artifact is noted overlying the brain stem and posterior fossa which is inherent to CT in this location.      Impression:      No evidence of intracranial hemorrhage, territorial infarct, or mass effect.    --- End of Report ---              DEBBIE COOL MD; Attending Radiologist  This document has been electronically signed. Jul 24 2021 10:07PM    EXAM:  CT CERVICAL SPINE            PROCEDURE DATE:  07/24/2021            INTERPRETATION:  Clinical History / Reason for exam: Trauma    TECHNIQUE:  Contiguous unenhanced CT axial images of the cervical spine with coronal and sagittal re-formations.    COMPARISON: None available    FINDINGS:    There is no evidence of acute fracture, compression deformity or facet subluxation of the cervical spine.    The atlantoaxial relationships are maintained.  The posterior elements are intact. There is no significant prevertebral soft tissue swelling. The visualized paraspinal soft tissues are unremarkable.    There are multilevel endplate degenerative changes as evidenced by disc space narrowing, uncovertebral hypertrophy and facet arthropathy. This is most pronounced at C4-C5 levels and associated with at least moderate to severe spinal canal stenosis.    There is straightening of the cervical lordosis.      IMPRESSION:      No evidence of a cervical spine fracture or subluxation.    Straightening of the cervical lordosis may be secondary to positioning or muscle spasm.    --- End of Report ---              DEBBIE COOL MD; Attending Radiologist  This document has been electronically signed. Jul 24 2021 10:44PM    EXAM:  CT ABDOMEN AND PELVIS IC        EXAM:  CT CHEST IC            PROCEDURE DATE:  07/24/2021            INTERPRETATION:  CLINICAL HISTORY/REASON FOR EXAM: Trauma to chest, abdomen and pelvis. Fall. Breast cancer.    TECHNIQUE: Contiguous axial CT images were obtained from the thoracic inlet to the pubic symphysis following administration of 100 cc Optiray 320 intravenous contrast. 0 cc contrast discarded.  Oral contrast was not administered. Reformatted images in the coronal and sagittal planes were acquired. 3D (MIP) images obtained.    COMPARISON: CT chest November 19, 2020, CT abdomen and pelvis July 29, 2019.      FINDINGS:    Images partially degraded by motion.    CHEST:    LUNGS, PLEURA, AIRWAYS: No lobar consolidation, mass, effusion, or pneumothorax. No evidence of central endobronchial obstruction. No bronchiectasis or honeycombing. No suspicious pulmonary nodule. Bilateral subsegmental atelectasis. Left suprahilar patchy groundglass opacities, findings are possibly inflammatory in etiology.    THORACIC NODES: No mediastinal, hilar, supraclavicular, or axillary lymphadenopathy. Unchanged calcified mediastinal lymph nodes.    MEDIASTINUM/GREAT VESSELS: No pericardial effusion. Heart size is within normal limits. The aorta and main pulmonary artery are of normal caliber.    ABDOMEN/PELVIS:    HEPATOBILIARY: Liver unremarkable. Gallbladder unremarkable. No biliary dilation.    SPLEEN: Unremarkable.    PANCREAS: Unremarkable.    ADRENAL GLANDS: Unremarkable.    KIDNEYS: Symmetric pattern of renal enhancement. No hydronephrosis bilaterally. Subcentimeter bilateral renal hypodensities    ABDOMINOPELVIC NODES: No lymphadenopathy.    PELVIC ORGANS: Unremarkable.    PERITONEUM/MESENTERY/BOWEL: No bowel obstruction. No ascites or pneumoperitoneum. Normal appendix. Scattered colonic diverticula.    BONES/SOFT TISSUES: Post left lumpectomy. Degenerative changes spine. No evidence of acute osseous abnormality.    OTHER: Vascular calcifications. Fat-containing umbilical hernia.      IMPRESSION:  No evidence of acute traumatic intrathoracic or intra-abdominal pathology.    --- End of Report ---              CARLIN FORREST MD; Attending Radiologist  This document has been electronically signed. Jul 24 2021  9:50PM        07-26    140  |  104  |  11  ----------------------------<  125<H>  4.0   |  24  |  0.7    Ca    8.3<L>      26 Jul 2021 17:10  Mg     1.9     07-25    TPro  5.9<L>  /  Alb  3.7  /  TBili  1.0  /  DBili  x   /  AST  28  /  ALT  18  /  AlkPhos  69  07-25                          11.6   7.43  )-----------( 146      ( 26 Jul 2021 17:10 )             36.3     ICU Vital Signs Last 24 Hrs  T(C): 35.9 (26 Jul 2021 20:13), Max: 37.1 (26 Jul 2021 05:32)  T(F): 96.7 (26 Jul 2021 20:13), Max: 98.8 (26 Jul 2021 05:32)  HR: 78 (26 Jul 2021 20:13) (78 - 91)  BP: 115/66 (26 Jul 2021 20:13) (115/66 - 137/73)  BP(mean): --  ABP: --  ABP(mean): --  RR: 18 (26 Jul 2021 20:13) (18 - 18)  SpO2: 98% (26 Jul 2021 09:55) (97% - 98%)  NAD, A+Ox3  MAEX x 4  soft, ND/NT  Sensation to light touch grossly intact  TTP lumbar spine

## 2021-07-26 NOTE — OCCUPATIONAL THERAPY INITIAL EVALUATION ADULT - ADDITIONAL COMMENTS
Pt was independent prior to admit per pt and dtr.  Utilized rollator in home, has bathtub with removable grab bar on tub wall

## 2021-07-26 NOTE — OCCUPATIONAL THERAPY INITIAL EVALUATION ADULT - TRANSFER TRAINING, PT EVAL
Patient will perform bed <> chair transfers with max assistance with approriate assistive device by discharge

## 2021-07-27 ENCOUNTER — TRANSCRIPTION ENCOUNTER (OUTPATIENT)
Age: 76
End: 2021-07-27

## 2021-07-27 LAB
ALBUMIN SERPL ELPH-MCNC: 3.1 G/DL — LOW (ref 3.5–5.2)
ALP SERPL-CCNC: 53 U/L — SIGNIFICANT CHANGE UP (ref 30–115)
ALT FLD-CCNC: 14 U/L — SIGNIFICANT CHANGE UP (ref 0–41)
ANION GAP SERPL CALC-SCNC: 11 MMOL/L — SIGNIFICANT CHANGE UP (ref 7–14)
AST SERPL-CCNC: 16 U/L — SIGNIFICANT CHANGE UP (ref 0–41)
BILIRUB SERPL-MCNC: 0.4 MG/DL — SIGNIFICANT CHANGE UP (ref 0.2–1.2)
BUN SERPL-MCNC: 14 MG/DL — SIGNIFICANT CHANGE UP (ref 10–20)
CALCIUM SERPL-MCNC: 8.4 MG/DL — LOW (ref 8.5–10.1)
CHLORIDE SERPL-SCNC: 106 MMOL/L — SIGNIFICANT CHANGE UP (ref 98–110)
CO2 SERPL-SCNC: 24 MMOL/L — SIGNIFICANT CHANGE UP (ref 17–32)
CREAT SERPL-MCNC: 0.6 MG/DL — LOW (ref 0.7–1.5)
GLUCOSE SERPL-MCNC: 88 MG/DL — SIGNIFICANT CHANGE UP (ref 70–99)
HCT VFR BLD CALC: 34.9 % — LOW (ref 37–47)
HGB BLD-MCNC: 11.2 G/DL — LOW (ref 12–16)
MAGNESIUM SERPL-MCNC: 2 MG/DL — SIGNIFICANT CHANGE UP (ref 1.8–2.4)
MCHC RBC-ENTMCNC: 30.6 PG — SIGNIFICANT CHANGE UP (ref 27–31)
MCHC RBC-ENTMCNC: 32.1 G/DL — SIGNIFICANT CHANGE UP (ref 32–37)
MCV RBC AUTO: 95.4 FL — SIGNIFICANT CHANGE UP (ref 81–99)
NRBC # BLD: 0 /100 WBCS — SIGNIFICANT CHANGE UP (ref 0–0)
PLATELET # BLD AUTO: 141 K/UL — SIGNIFICANT CHANGE UP (ref 130–400)
POTASSIUM SERPL-MCNC: 4.5 MMOL/L — SIGNIFICANT CHANGE UP (ref 3.5–5)
POTASSIUM SERPL-SCNC: 4.5 MMOL/L — SIGNIFICANT CHANGE UP (ref 3.5–5)
PROT SERPL-MCNC: 5.3 G/DL — LOW (ref 6–8)
RBC # BLD: 3.66 M/UL — LOW (ref 4.2–5.4)
RBC # FLD: 12.4 % — SIGNIFICANT CHANGE UP (ref 11.5–14.5)
SODIUM SERPL-SCNC: 141 MMOL/L — SIGNIFICANT CHANGE UP (ref 135–146)
WBC # BLD: 5.26 K/UL — SIGNIFICANT CHANGE UP (ref 4.8–10.8)
WBC # FLD AUTO: 5.26 K/UL — SIGNIFICANT CHANGE UP (ref 4.8–10.8)

## 2021-07-27 PROCEDURE — 99232 SBSQ HOSP IP/OBS MODERATE 35: CPT

## 2021-07-27 RX ORDER — ACETAMINOPHEN 500 MG
975 TABLET ORAL EVERY 8 HOURS
Refills: 0 | Status: DISCONTINUED | OUTPATIENT
Start: 2021-07-27 | End: 2021-07-28

## 2021-07-27 RX ORDER — LIDOCAINE 4 G/100G
1 CREAM TOPICAL
Refills: 0 | Status: DISCONTINUED | OUTPATIENT
Start: 2021-07-27 | End: 2021-07-28

## 2021-07-27 RX ORDER — OXYCODONE AND ACETAMINOPHEN 5; 325 MG/1; MG/1
2 TABLET ORAL EVERY 6 HOURS
Refills: 0 | Status: DISCONTINUED | OUTPATIENT
Start: 2021-07-27 | End: 2021-07-28

## 2021-07-27 RX ORDER — LIDOCAINE 4 G/100G
1 CREAM TOPICAL
Qty: 10 | Refills: 0
Start: 2021-07-27 | End: 2021-07-31

## 2021-07-27 RX ORDER — CYCLOBENZAPRINE HYDROCHLORIDE 10 MG/1
5 TABLET, FILM COATED ORAL AT BEDTIME
Refills: 0 | Status: DISCONTINUED | OUTPATIENT
Start: 2021-07-27 | End: 2021-07-28

## 2021-07-27 RX ADMIN — LIDOCAINE 1 PATCH: 4 CREAM TOPICAL at 11:14

## 2021-07-27 RX ADMIN — CYCLOBENZAPRINE HYDROCHLORIDE 5 MILLIGRAM(S): 10 TABLET, FILM COATED ORAL at 21:25

## 2021-07-27 RX ADMIN — BUDESONIDE AND FORMOTEROL FUMARATE DIHYDRATE 2 PUFF(S): 160; 4.5 AEROSOL RESPIRATORY (INHALATION) at 20:05

## 2021-07-27 RX ADMIN — PANTOPRAZOLE SODIUM 40 MILLIGRAM(S): 20 TABLET, DELAYED RELEASE ORAL at 07:18

## 2021-07-27 RX ADMIN — LOSARTAN POTASSIUM 25 MILLIGRAM(S): 100 TABLET, FILM COATED ORAL at 05:52

## 2021-07-27 RX ADMIN — LIDOCAINE 1 PATCH: 4 CREAM TOPICAL at 19:53

## 2021-07-27 RX ADMIN — OXYCODONE AND ACETAMINOPHEN 2 TABLET(S): 5; 325 TABLET ORAL at 12:05

## 2021-07-27 RX ADMIN — ENOXAPARIN SODIUM 40 MILLIGRAM(S): 100 INJECTION SUBCUTANEOUS at 05:53

## 2021-07-27 RX ADMIN — MONTELUKAST 10 MILLIGRAM(S): 4 TABLET, CHEWABLE ORAL at 11:14

## 2021-07-27 RX ADMIN — ENOXAPARIN SODIUM 40 MILLIGRAM(S): 100 INJECTION SUBCUTANEOUS at 18:35

## 2021-07-27 RX ADMIN — CHLORHEXIDINE GLUCONATE 1 APPLICATION(S): 213 SOLUTION TOPICAL at 05:52

## 2021-07-27 RX ADMIN — Medication 81 MILLIGRAM(S): at 11:14

## 2021-07-27 RX ADMIN — Medication 1 APPLICATION(S): at 11:15

## 2021-07-27 RX ADMIN — Medication 200 MICROGRAM(S): at 05:52

## 2021-07-27 RX ADMIN — OXYCODONE AND ACETAMINOPHEN 2 TABLET(S): 5; 325 TABLET ORAL at 11:27

## 2021-07-27 NOTE — CHART NOTE - NSCHARTNOTEFT_GEN_A_CORE
Mrs. Callahan was brought to the ED on 07/25 following a fall. History goes back to one night prior to presentation when the patient received a dose of percocet 10mg and Ambien 10mg at night. She then was on her way to the kitchen when she fell on her buttock. Fall was not witnessed but by time daughter arrived, she was found on the floor (but not sure for how long she was on the floor). Before the episode, patient denied any light headedness, chest pain, diaphoresis, or palpitations. During the episode, patient denies any head trauma, loss of consciousness, seizure like activity (jerky movements of extremities, incontinence, tongue biting, drooling, uprolling of eyes) but she was not able to stand up due to bilateral knee and lower back pain. After the episode, patient was not confused and was at baseline mentation.    This patient has a mobility limitation that significantly impairs the pt's ability to participate in one or more MRADL's such as toileting, eating, dressing, and bathing in customary locations in the whome. The pt's home provides adequate access between rooms for the use of the manual wheelchair. The wheelchair will significantly improve the pt's ability to participate in MRADL's and will be used on a regular basis in the home. The pt's mobility limitation cannot be resolved by the use of a walker or cane. The patient has sufficient upper extremity function to safely propel the manual wheelchair in the home during a typical day. The pt has agreed to use the manual wheelchair that is provided in the home.

## 2021-07-27 NOTE — DISCHARGE NOTE PROVIDER - NSDCFUSCHEDAPPT_GEN_ALL_CORE_FT
STEIN, EL ; 09/08/2021 ; [facility] [address] [place], [place] ; 09/21/2021 ; [facility] [address] [place], [place] ; 10/02/2021 ; [facility] [address] [place], [place] ; 09/16/2021 ; [facility] [address] [place], [place] ; 09/21/2021 ; [facility] [address] [place], [place] ; 10/02/2021 ; [facility] [address]

## 2021-07-27 NOTE — PROGRESS NOTE ADULT - SUBJECTIVE AND OBJECTIVE BOX
CYRUS STEINRED  76y  Female      Patient is a 76y old  Female who presents with a chief complaint of Fall (27 Jul 2021 10:52)      INTERVAL HPI/OVERNIGHT EVENTS:  Still with back and knee pain.   Vital Signs Last 24 Hrs  T(C): 36 (27 Jul 2021 13:07), Max: 36.1 (27 Jul 2021 05:45)  T(F): 96.8 (27 Jul 2021 13:07), Max: 97 (27 Jul 2021 05:45)  HR: 77 (27 Jul 2021 13:07) (71 - 78)  BP: 124/72 (27 Jul 2021 13:07) (115/66 - 124/72)  BP(mean): --  RR: 18 (27 Jul 2021 13:07) (18 - 20)  SpO2: --      07-26-21 @ 07:01  -  07-27-21 @ 07:00  --------------------------------------------------------  IN: 1780 mL / OUT: 3200 mL / NET: -1420 mL            Consultant(s) Notes Reviewed:  [x ] YES  [ ] NO          MEDICATIONS  (STANDING):  aspirin  chewable 81 milliGRAM(s) Oral daily  budesonide 160 MICROgram(s)/formoterol 4.5 MICROgram(s) Inhaler 2 Puff(s) Inhalation two times a day  chlorhexidine 4% Liquid 1 Application(s) Topical <User Schedule>  cyclobenzaprine 5 milliGRAM(s) Oral at bedtime  enoxaparin Injectable 40 milliGRAM(s) SubCutaneous two times a day  levothyroxine 200 MICROGram(s) Oral daily  lidocaine   4% Patch 1 Patch Transdermal <User Schedule>  losartan 25 milliGRAM(s) Oral daily  montelukast 10 milliGRAM(s) Oral daily  pantoprazole    Tablet 40 milliGRAM(s) Oral before breakfast  silver sulfADIAZINE 1% Cream 1 Application(s) Topical daily    MEDICATIONS  (PRN):  acetaminophen    Suspension .. 975 milliGRAM(s) Oral every 8 hours PRN Moderate Pain (4 - 6)  oxycodone    5 mG/acetaminophen 325 mG 2 Tablet(s) Oral every 6 hours PRN Severe Pain (7 - 10)      LABS                          11.2   5.26  )-----------( 141      ( 27 Jul 2021 07:53 )             34.9     07-27    141  |  106  |  14  ----------------------------<  88  4.5   |  24  |  0.6<L>    Ca    8.4<L>      27 Jul 2021 07:53  Mg     2.0     07-27    TPro  5.3<L>  /  Alb  3.1<L>  /  TBili  0.4  /  DBili  x   /  AST  16  /  ALT  14  /  AlkPhos  53  07-27          Lactate Trend  07-24 @ 20:05 Lactate:1.2     CARDIAC MARKERS ( 26 Jul 2021 17:10 )  x     / <0.01 ng/mL / x     / x     / x          CAPILLARY BLOOD GLUCOSE          Culture - Blood (collected 07-25-21 @ 11:55)  Source: .Blood None  Preliminary Report (07-26-21 @ 18:01):    No growth to date.    Culture - Urine (collected 07-24-21 @ 19:06)  Source: Clean Catch Clean Catch (Midstream)  Final Report (07-26-21 @ 09:58):    <10,000 CFU/mL Normal Urogenital Maggie        RADIOLOGY & ADDITIONAL TESTS:    Imaging Personally Reviewed:  [ ] YES  [ ] NO    HEALTH ISSUES - PROBLEM Dx:          PHYSICAL EXAM:  GENERAL: Obese, in pain  HEAD:  Atraumatic, Normocephalic.  EYES: EOMI, PERRLA, conjunctiva and sclera clear.  NECK: Supple, No JVD.  CHEST/LUNG: Clear to auscultation bilaterally; No wheeze.  HEART: Regular rate and rhythm; S1 S2.   ABDOMEN: Soft, Nontender, Nondistended; Bowel sounds present.  EXTREMITIES:  chronic venous stasis in LE, pulses ar ok.   PSYCH: AAOx3.  NEUROLOGY: non-focal.  SKIN: No rashes or lesions.

## 2021-07-27 NOTE — DISCHARGE NOTE PROVIDER - NSDCFUADDINST_GEN_ALL_CORE_FT
Please take your medications as prescribed and follow up with your primary care doctor in 2 weeks. Please take your medications as prescribed and follow up with your primary care doctor in 2 weeks.    Please follow up with the pain management doctor, Dr. Simmons in 1 week.

## 2021-07-27 NOTE — DISCHARGE NOTE PROVIDER - PROVIDER TOKENS
PROVIDER:[TOKEN:[[id number]],FOLLOWUP:[2 weeks]] PROVIDER:[TOKEN:[[id number]],FOLLOWUP:[2 weeks]],PROVIDER:[TOKEN:[[id number]],FOLLOWUP:[1 week]]

## 2021-07-27 NOTE — DISCHARGE NOTE PROVIDER - HOSPITAL COURSE
76 year old female patient with HTN, DL, OA, Asthma, Hypothyroidism, history of breast cancer, and PE who was brought to the ED on 07/25 following a fall after a dose of percocet and ambien, found to have negative trauma workup, admitted to telemetry for monitoring and for placement. Currently hemodynamically stable. The night prior to hospitalization, patient received a dose of percocet 10mg and Ambien 10mg at night and then fell on her buttock while walking to the kitchen around 11PM. Before the episode, patient denied any light headedness, chest pain, diaphoresis, or palpitations. During the fall the patient admitted she landed on buttock and denied head trauma, loss of consciousness, or seizure like activity (jerky movements of extremities, incontinence, tongue biting, drooling, uprolling of eyes). She was not able to stand up due to bilateral knee and lower back pain. Daughter found her the next day around 3PM and stated patient was lethargic when she found her.  She was brought to the ED on 07/25 following a fall. XR of pelvis, CT Head, non-contrast CT C-spine, CT CAP IC showed no pelvic fractures, no acute intracranial process, no cervical fracture of subluxation, and no acute thoracic/abdominal process. Left suprahilar patchy groundglass opacities were seen suggestive of inflammation.

## 2021-07-27 NOTE — PROGRESS NOTE ADULT - ASSESSMENT
76 year old female with PMH of HTN, DL, OA, Asthma, Hypothyroidism, history of breast cancer, and PE who was brought to the ED on 07/25 following a fall after a dose of percocet and Ambien found to have negative trauma workup, admitted to telemetry for monitoring and for placement. Currently hemodynamically stable.    A/P:   Fall: possibly side effect of narcotic.   Trauma work up Head and Neck CT was negative. Pelvic X ray no fracture.   CT abdomen and pelvis no acute intra thoracic or abdominal process, left suprahilar patchy groundglass opacities suggestive of inflammation  Echo showed  LVEF65%, mild-mod TR, Pulmonary hypertension (systolic 50.2 mmHg).   Patient is unable to participate with PT due to chronic back pain.     Chronic back and knee pain due to osteoarthritis  On pain treatment with Oxycodone.   Pain consult appreciated, add Lidocaine patch and Tizanidine.     Morbid Obesity  BMI 51, low calories diet.     HTN: Continue Losartan.     History of  Breast Carcinoma   s/p left sided Lumpectomy, Outpatient follow up.     Asthma: controlled, continue Montelukast 10mg QD, Symbicort 160-4.5 2 puffs BID , Epi pen    Hypothyroidism  s/p Thyroidectomy in 2017. Continue Levothyroxine 200 mcg QD    Plan to discharge home today. Patient and Family refused STR, she will need bonilla lift.

## 2021-07-27 NOTE — DISCHARGE NOTE PROVIDER - NSDCMRMEDTOKEN_GEN_ALL_CORE_FT
Ambien 10 mg oral tablet: 1 tab(s) orally once a day (at bedtime)  aspirin 81 mg oral tablet: 1 tab(s) orally once a day  calcitriol 0.5 mcg oral capsule: 1 cap(s) orally once a week  EpiPen 2-Caleb 0.3 mg injectable kit: 0.3 milligram(s) intramuscularly prn MDD:prn  levothyroxine 200 mcg (0.2 mg) oral tablet: 1 tab(s) orally once a day  losartan 25 mg oral tablet: 1 tab(s) orally once a day   montelukast 10 mg oral tablet: 1 tab(s) orally once a day  Percocet 10/325 oral tablet: 1 tab(s) orally every 6 hours  Silvadene 1% topical cream: Apply topically to Legs 2 times a day   Symbicort 160 mcg-4.5 mcg/inh inhalation aerosol: 2 puff(s) inhaled 2 times a day  Xanax 1 mg oral tablet: 1 tab(s) orally once a day, As Needed

## 2021-07-27 NOTE — DISCHARGE NOTE PROVIDER - NSDCCPCAREPLAN_GEN_ALL_CORE_FT
PRINCIPAL DISCHARGE DIAGNOSIS  Diagnosis: Fall  Assessment and Plan of Treatment: You came to the hospital because you fell down on your buttocks. You didn't have any symptoms beforehand, but the fall was unwittnessed. You will be discharged on pain medication and a wheelchair and bonilla lift. Please take your medications as prescribed and folllow up with your primary care provider in 2 weeks.  SEEK IMMEDIATE MEDICAL ATTENTION IF: You have fallen and are unconscious, you have fallen and cannot move part of your body, or you have fallen and have pain or a headache.  FALL PREVENTION TIPS:  Stand or sit up slowly. Use assistive devices as directed. You may need to have grab bars put in your bathroom near the toilet or in the shower.   Wear shoes that fit well and have soles that . Wear shoes both inside and outside. Do not wear shoes with high heels.   Wear a personal alarm that can call 911 in an emergency.   Manage your medical conditions. Keep all appointments with your healthcare providers. Visit your eye doctor as directed.  HOME SAFETY TIPS:  Put nonslip strips on your bath or shower floor to prevent you from  Use a shower seat so you do not need to stand while you shower. Sit on the toilet or a chair in your bathroom to dry yourself and put on clothing. This will prevent you from losing your balance from drying or dressing yourself while you are standing.   Keep paths clear. Remove books, shoes, and other objects from walkways and stairs. Place cords for telephones and lamps out of the way so that you do not need to walk over them. Tape them down if you cannot move them. Remove small rugs or secure it with double-sided tape to prevent you from   Install bright lights in your home. Use night lights to help light paths to the bathroom or kitchen. Always turn on the light before you start walking.   Keep items you use often on shelves within reach. Do not use a step stool to help you reach an item.   Place reflective tape on the edges of your stairs to see better.

## 2021-07-27 NOTE — DISCHARGE NOTE PROVIDER - CARE PROVIDER_API CALL
YARON LIZ  [id number]  [address] [place] [address]  Phone: [phone]  Fax: [phone]  Follow Up Time: 2 weeks   YARON LIZ  [id number]  [address] [place] [address]  Phone: [phone]  Fax: [phone]  Follow Up Time: 2 weeks    Candido Simmons (MD)  [facility]  [address] [place] [address]  Phone: [phone]  Fax: [phone]  Follow Up Time: 1 week

## 2021-07-28 VITALS — OXYGEN SATURATION: 98 %

## 2021-07-28 LAB
ANION GAP SERPL CALC-SCNC: 10 MMOL/L — SIGNIFICANT CHANGE UP (ref 7–14)
BUN SERPL-MCNC: 12 MG/DL — SIGNIFICANT CHANGE UP (ref 10–20)
CALCIUM SERPL-MCNC: 8.4 MG/DL — LOW (ref 8.5–10.1)
CHLORIDE SERPL-SCNC: 104 MMOL/L — SIGNIFICANT CHANGE UP (ref 98–110)
CO2 SERPL-SCNC: 25 MMOL/L — SIGNIFICANT CHANGE UP (ref 17–32)
CREAT SERPL-MCNC: 0.6 MG/DL — LOW (ref 0.7–1.5)
GLUCOSE SERPL-MCNC: 89 MG/DL — SIGNIFICANT CHANGE UP (ref 70–99)
HCT VFR BLD CALC: 35.9 % — LOW (ref 37–47)
HGB BLD-MCNC: 11.3 G/DL — LOW (ref 12–16)
MAGNESIUM SERPL-MCNC: 1.9 MG/DL — SIGNIFICANT CHANGE UP (ref 1.8–2.4)
MCHC RBC-ENTMCNC: 29.3 PG — SIGNIFICANT CHANGE UP (ref 27–31)
MCHC RBC-ENTMCNC: 31.5 G/DL — LOW (ref 32–37)
MCV RBC AUTO: 93 FL — SIGNIFICANT CHANGE UP (ref 81–99)
NRBC # BLD: 0 /100 WBCS — SIGNIFICANT CHANGE UP (ref 0–0)
PLATELET # BLD AUTO: 159 K/UL — SIGNIFICANT CHANGE UP (ref 130–400)
POTASSIUM SERPL-MCNC: 4.6 MMOL/L — SIGNIFICANT CHANGE UP (ref 3.5–5)
POTASSIUM SERPL-SCNC: 4.6 MMOL/L — SIGNIFICANT CHANGE UP (ref 3.5–5)
RBC # BLD: 3.86 M/UL — LOW (ref 4.2–5.4)
RBC # FLD: 12.4 % — SIGNIFICANT CHANGE UP (ref 11.5–14.5)
SODIUM SERPL-SCNC: 139 MMOL/L — SIGNIFICANT CHANGE UP (ref 135–146)
WBC # BLD: 5.6 K/UL — SIGNIFICANT CHANGE UP (ref 4.8–10.8)
WBC # FLD AUTO: 5.6 K/UL — SIGNIFICANT CHANGE UP (ref 4.8–10.8)

## 2021-07-28 PROCEDURE — 99238 HOSP IP/OBS DSCHRG MGMT 30/<: CPT

## 2021-07-28 RX ADMIN — Medication 200 MICROGRAM(S): at 05:18

## 2021-07-28 RX ADMIN — Medication 81 MILLIGRAM(S): at 11:37

## 2021-07-28 RX ADMIN — BUDESONIDE AND FORMOTEROL FUMARATE DIHYDRATE 2 PUFF(S): 160; 4.5 AEROSOL RESPIRATORY (INHALATION) at 08:47

## 2021-07-28 RX ADMIN — LIDOCAINE 1 PATCH: 4 CREAM TOPICAL at 08:47

## 2021-07-28 RX ADMIN — MONTELUKAST 10 MILLIGRAM(S): 4 TABLET, CHEWABLE ORAL at 11:37

## 2021-07-28 RX ADMIN — ENOXAPARIN SODIUM 40 MILLIGRAM(S): 100 INJECTION SUBCUTANEOUS at 05:19

## 2021-07-28 RX ADMIN — PANTOPRAZOLE SODIUM 40 MILLIGRAM(S): 20 TABLET, DELAYED RELEASE ORAL at 06:09

## 2021-07-28 RX ADMIN — CHLORHEXIDINE GLUCONATE 1 APPLICATION(S): 213 SOLUTION TOPICAL at 05:19

## 2021-07-28 RX ADMIN — LOSARTAN POTASSIUM 25 MILLIGRAM(S): 100 TABLET, FILM COATED ORAL at 05:18

## 2021-07-28 NOTE — PROGRESS NOTE ADULT - SUBJECTIVE AND OBJECTIVE BOX
CHIEF COMPLAINT:    Patient is a 76y old  Female who presents with a chief complaint of Fall     INTERVAL HPI/OVERNIGHT EVENTS:    Patient seen and examined at bedside. No acute overnight events occurred.    ROS: Reports abdominal discomfort from constipation. All other systems are negative.    Vital Signs:    T(F): 98.7 (21 @ 04:44), Max: 98.9 (21 @ 20:12)  HR: 70 (21 @ 04:44) (70 - 88)  BP: 138/74 (21 @ 04:44) (138/74 - 139/88)  RR: 16 (21 @ 04:44) (16 - 18)  SpO2: 96% (21 @ 08:06) (96% - 96%)  I&O's Summary    2021 07:01  -  2021 07:00  --------------------------------------------------------  IN: 360 mL / OUT: 1225 mL / NET: -865 mL      Daily     Daily Weight in k.8 (2021 04:44)  CAPILLARY BLOOD GLUCOSE    PHYSICAL EXAM:  GENERAL:  NAD, obese  SKIN: No rashes or lesions  HEENT: Atraumatic. Normocephalic. Anicteric  NECK:  No JVD.   PULMONARY: Clear to ausculation bilaterally. No wheezing. No rales  CVS: Normal S1, S2. Regular rate and rhythm. No murmurs.  ABDOMEN/GI: Soft, Nontender, Nondistended; Bowel sounds are present  EXTREMITIES:  No edema B/L LE.  NEUROLOGIC:  No motor deficit.  PSYCH: Alert & oriented x 3, normal affect      LABS:                        11.3   5.60  )-----------( 159      ( 2021 05:54 )             35.9         139  |  104  |  12  ----------------------------<  89  4.6   |  25  |  0.6<L>    Ca    8.4<L>      2021 05:54  Mg     1.9         TPro  5.3<L>  /  Alb  3.1<L>  /  TBili  0.4  /  DBili  x   /  AST  16  /  ALT  14  /  AlkPhos  53          Trop <0.01, CKMB --, CK --, 21 @ 17:10        RADIOLOGY & ADDITIONAL TESTS:  Imaging or report Personally Reviewed:  [ ] YES  [ ] NO      Medications:  Standing  aspirin  chewable 81 milliGRAM(s) Oral daily  budesonide 160 MICROgram(s)/formoterol 4.5 MICROgram(s) Inhaler 2 Puff(s) Inhalation two times a day  chlorhexidine 4% Liquid 1 Application(s) Topical <User Schedule>  cyclobenzaprine 5 milliGRAM(s) Oral at bedtime  enoxaparin Injectable 40 milliGRAM(s) SubCutaneous two times a day  levothyroxine 200 MICROGram(s) Oral daily  lidocaine   4% Patch 1 Patch Transdermal <User Schedule>  losartan 25 milliGRAM(s) Oral daily  montelukast 10 milliGRAM(s) Oral daily  pantoprazole    Tablet 40 milliGRAM(s) Oral before breakfast  silver sulfADIAZINE 1% Cream 1 Application(s) Topical daily    PRN Meds  oxycodone    5 mG/acetaminophen 325 mG 2 Tablet(s) Oral every 6 hours PRN      Case discussed with resident  Care discussed with pt

## 2021-07-28 NOTE — PROGRESS NOTE ADULT - ASSESSMENT
77 yo F PMHx HTN, DLD, OA, asthma, hypothyroidism, history of breast cancer, and PE who presented for evaluation after a fall due to polypharmacy with  percocet and Ambien.     Fall  - likely due to polypharmacy  - no fractures on imaging  - family and pt adamantly declining SNF, requesting home with equipment  - telemetry, echo mostly unremarkable    Chronic back and knee pain due to osteoarthritis  - pain mgmt consult appreciated:  - Start tyleno 975mg TID standing  - Continue home dose oxycodone 10mg QID PRN  - Start tizanidine 2mg TID PRN  - Start lidocaine patch to low back q12hrs  - Start cyclobenzaprine 5mg QHS   - Follow up with Dr. Harley as outpatient    Pulmonary hypertension  - outpt pulmonary     Constipation  - likely due to opioids and immobility  - bowel regimine    Morbid Obesity  - BMI 51, low calories diet.     HTN  - Continue Losartan.     History of  Breast Carcinoma   - s/p left sided Lumpectomy, Outpatient follow up.     Asthma  - controlled, continue Montelukast 10mg QD, Symbicort 160-4.5 2 puffs BID , Epi pen    Hypothyroidism  - s/p Thyroidectomy in 2017. Continue Levothyroxine 200 mcg QD    #Progress Note Handoff:  Pending (specify): bonilla lift  Family discussion: Discussed with pt and daughter at bedside regarding pain control  Disposition: Home__x_/SNF___/Other________/Unknown at this time________

## 2021-07-30 LAB
CULTURE RESULTS: SIGNIFICANT CHANGE UP
SPECIMEN SOURCE: SIGNIFICANT CHANGE UP

## 2021-08-03 NOTE — ED ADULT TRIAGE NOTE - HEIGHT IN CM
"BP (!) 163/84 (BP Location: Right arm, Patient Position: Sitting, Cuff Size: Adult Large)   Pulse 104   Temp 98.2  F (36.8  C) (Oral)   Resp 14   Ht 1.676 m (5' 6\")   Wt 128.5 kg (283 lb 4.8 oz)   SpO2 (!) 83%   BMI 45.73 kg/m      "
167.64

## 2021-09-15 PROBLEM — R92.8 ABNORMAL FINDING ON BREAST IMAGING: Status: ACTIVE | Noted: 2019-03-06

## 2021-09-15 NOTE — ASSESSMENT
[FreeTextEntry1] : EL is a agusto 76 year old patient who presented today in follow up for a history of left breast cancer.  \par She has been doing well with no new breast related complaints. \par Imaging was done on 07/13/21 which revealed  redemonstration of postsurgical changes at lumpectomy site in the lateral left breast. Also there are grouped calcifications noted at the lumpectomy site which may represent evolving fat necrosis--> short interval follow- up was recommended. Deemed BI-RADS3\par \par Physical exam,.\par \par We also discussed BIRADS 3 lesions.  These lesions have a 2% chance of harboring malignancy.  There is always an option to obtain a tissue sample with a biopsy to confirm the diagnosis.   The procedure was described in detail including the placement of a tissue marker clip.  The risks of the procedure, including but not limited to bleeding and infection were also explained.  She is not interested in biopsy at this time and agrees to continue with short-term interval imaging. \par \par \par PLAN: \par -She will return for LEFT dx mammo January 2022\par -follow up after \par -She will continue follow-up with medical oncology as well. \par \par

## 2021-09-15 NOTE — HISTORY OF PRESENT ILLNESS
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 \par \par Right benign breast tissue with proliferative type fibrocystic changes on MR guided core biopsy 3/7/19; Central, 7 mm (cork).  \par \par \par Status post Left NLOC and Left SLNB 4/3/19 demonstrating 0/2 (-) SLNB; LUOQ NLOC two healing prior biopsy sites 20 mm with invasive well differentiated IDC and non extensive DCIS solid and cribiform types with comedo necrosis and calcifications low to intermediate nuclear grade; no LVI is seen; with negative margins.  AJCC 8th Edition Pathologic Stage: pT1c, p(sn)N0, pMx.\par \par Dr. Sandra Rondon.\par Patient met with radiation oncology; patient and her daughter refuse radiation therapy.  \par \par EL STEIN is a 76 year old female patient who presents today in follow up for left breast cancer.\par Since her last visit, she has no new breast related complaints. \par Imaging was done on 01/12/2021, which revealed less prominent postoperative changes in the left breast.\par \par INTERVAL HISTORY 09/16/21\par EL STEIN is a 76 year old female patient who presents today in follow up for left breast cancer. She is s/p Left NLOC and Left SLNB 4/3/19 demonstrating 0/2 (-) SLNB; LUOQ NLOC two healing prior biopsy sites 20 mm with invasive well differentiated IDC and non extensive DCIS solid and cribiform types with comedo necrosis and calcifications low to intermediate nuclear grade; no LVI is seen; with negative margins.  AJCC 8th Edition Pathologic Stage: pT1c, p(sn)N0, pMx.\par \par On most recent imaging:\par 07/13/2021 LEFT dx mammo:\par   - scattered areas of fibroglandular density.\par   - redemonstration of postsurgical changes at lumpectomy site in the lateral left breast. There are grouped             calcifications noted at the lumpectomy site which may represent evolving fat necrosis--> short interval follow-        up recommended. Deemed BI-RADS3\par LEFT U/S:\par     -postsurgical changes noted at 12-1 o'clock\par

## 2021-09-15 NOTE — DATA REVIEWED
[FreeTextEntry1] : EXAM:  MG US BREAST LIMITED LT\par EXAM:  MG MAMMO DIAG W TORSTEN BI#\par \par \par PROCEDURE DATE:  01/12/2021\par \par \par \par INTERPRETATION:   CLINICAL HISTORY / REASON FOR EXAM: Left lumpectomy follow-up, surgery in April 2019.\par \par The patient reports her last clinical breast examination was performed 6 months ago.\par \par Comparison is made to the prior examinations dated back to December 2018.\par \par FINDINGS:\par \par Mammogram-\par 2-D/tomosynthesis full field ML as well as spot views in the CC and MLO projections of the left breast. Right CC and MLO views were obtained as well. Computer-aided detection was utilized in the interpretation of this examination.\par \par Breast composition: There are scattered areas of fibroglandular density.\par \par The lumpectomy site in the upper outer quadrant of the left breast demonstrates benign postoperative architectural distortion as well as essentially resolved collection. Several calcifications are noted at the lumpectomy site, stable. No new microcalcifications.\par \par Left Breast Sonogram-\par \par Targeted breast ultrasound was performed.\par \par Within the lumpectomy site at the 12:00 position, 7 cm from the nipple, there is an oval circumscribed pocket of complex fluid measuring 2.3 x 2.5 x 1.4 cm. This has markedly decreased since the prior exam with measured up to 6.1 cm\par \par IMPRESSION:\par \par Less prominent postoperative changes in the left breast.\par \par \par RECOMMENDATION: As per post lumpectomy routine, a follow-up unilateral left\par mammogram are recommended.\par \par BI-RADS Category 2: Benign\par \par \par EXAM:  MG US BREAST LIMITED LT\par EXAM:  MG MAMMO DIAG W TORSTEN LT#\par \par \par PROCEDURE DATE:  07/13/2021\par \par \par \par INTERPRETATION:  Clinical History / Reason for exam: Left breast malignancy status post lumpectomy in 2019.\par \par The patient reports last clinical breast examination was performed about twelve months ago.\par \par Family history of breast cancer: There is no family history of breast cancer.\par \par Comparisons: Mammograms dating back to 2019.\par \par Views obtained: left full field digital 2D and digital tomosynthesis images as well as magnification views.\par \par Computer-aided detection was utilized in the interpretation of this examination.\par \par Breast composition: There are scattered areas of fibroglandular density.\par \par Findings:\par \par Mammogram:\par There is redemonstration of postsurgical changes at lumpectomy site in the lateral left breast. There are grouped calcifications noted at the lumpectomy site which may represent evolving fat necrosis. However, short interval follow-up recommended.\par No suspicious mass, or areas of architectural distortion seen in the left breast.\par \par Ultrasound:\par \par Targeted unilateral left breast ultrasound was performed.\par \par There are postsurgical changes noted at 12-1 o'clock left breast at lumpectomy site correlating with mammographic finding above.\par \par Impression: Grouped calcifications noted at the lumpectomy site which may represent evolving fat necrosis. However, short interval follow-up recommended.\par \par Recommendation: Follow-up unilateral diagnostic mammogram in 6 months.\par \par BI-RADS category 3: Probably Benign\par

## 2021-09-16 ENCOUNTER — APPOINTMENT (OUTPATIENT)
Dept: BREAST CENTER | Facility: CLINIC | Age: 76
End: 2021-09-16

## 2021-09-16 DIAGNOSIS — R92.8 OTHER ABNORMAL AND INCONCLUSIVE FINDINGS ON DIAGNOSTIC IMAGING OF BREAST: ICD-10-CM

## 2021-09-21 ENCOUNTER — APPOINTMENT (OUTPATIENT)
Dept: BREAST CENTER | Facility: CLINIC | Age: 76
End: 2021-09-21

## 2021-09-24 PROBLEM — I26.99 OTHER PULMONARY EMBOLISM WITHOUT ACUTE COR PULMONALE: Chronic | Status: ACTIVE | Noted: 2019-05-09

## 2021-10-01 DIAGNOSIS — Z86.39 PERSONAL HISTORY OF OTHER ENDOCRINE, NUTRITIONAL AND METABOLIC DISEASE: ICD-10-CM

## 2021-10-02 ENCOUNTER — APPOINTMENT (OUTPATIENT)
Dept: ENDOCRINOLOGY | Facility: CLINIC | Age: 76
End: 2021-10-02

## 2021-10-14 ENCOUNTER — APPOINTMENT (OUTPATIENT)
Dept: ENDOCRINOLOGY | Facility: CLINIC | Age: 76
End: 2021-10-14
Payer: MEDICARE

## 2021-10-14 PROCEDURE — G2012 BRIEF CHECK IN BY MD/QHP: CPT

## 2022-01-24 ENCOUNTER — OUTPATIENT (OUTPATIENT)
Dept: OUTPATIENT SERVICES | Facility: HOSPITAL | Age: 77
LOS: 1 days | Discharge: HOME | End: 2022-01-24

## 2022-01-24 ENCOUNTER — RESULT REVIEW (OUTPATIENT)
Age: 77
End: 2022-01-24

## 2022-01-24 ENCOUNTER — APPOINTMENT (OUTPATIENT)
Dept: HEMATOLOGY ONCOLOGY | Facility: CLINIC | Age: 77
End: 2022-01-24
Payer: MEDICARE

## 2022-01-24 ENCOUNTER — APPOINTMENT (OUTPATIENT)
Dept: CARDIOLOGY | Facility: CLINIC | Age: 77
End: 2022-01-24
Payer: MEDICARE

## 2022-01-24 VITALS — HEIGHT: 64 IN | BODY MASS INDEX: 49.34 KG/M2 | WEIGHT: 289 LBS | RESPIRATION RATE: 20 BRPM

## 2022-01-24 VITALS — RESPIRATION RATE: 18 BRPM | DIASTOLIC BLOOD PRESSURE: 76 MMHG | SYSTOLIC BLOOD PRESSURE: 126 MMHG | HEART RATE: 94 BPM

## 2022-01-24 VITALS — HEIGHT: 64 IN | WEIGHT: 289 LBS | BODY MASS INDEX: 49.34 KG/M2

## 2022-01-24 DIAGNOSIS — Z17.0 MALIGNANT NEOPLASM OF UPPER-OUTER QUADRANT OF LEFT FEMALE BREAST: ICD-10-CM

## 2022-01-24 DIAGNOSIS — Z79.811 LONG TERM (CURRENT) USE OF AROMATASE INHIBITORS: ICD-10-CM

## 2022-01-24 DIAGNOSIS — M79.89 OTHER SPECIFIED SOFT TISSUE DISORDERS: ICD-10-CM

## 2022-01-24 DIAGNOSIS — Z86.711 PERSONAL HISTORY OF PULMONARY EMBOLISM: ICD-10-CM

## 2022-01-24 DIAGNOSIS — C50.412 MALIGNANT NEOPLASM OF UPPER-OUTER QUADRANT OF LEFT FEMALE BREAST: ICD-10-CM

## 2022-01-24 DIAGNOSIS — M85.80 OTHER SPECIFIED DISORDERS OF BONE DENSITY AND STRUCTURE, UNSPECIFIED SITE: ICD-10-CM

## 2022-01-24 DIAGNOSIS — Z98.890 OTHER SPECIFIED POSTPROCEDURAL STATES: Chronic | ICD-10-CM

## 2022-01-24 DIAGNOSIS — I71.4 ABDOMINAL AORTIC ANEURYSM, W/OUT RUPTURE: ICD-10-CM

## 2022-01-24 PROCEDURE — 99214 OFFICE O/P EST MOD 30 MIN: CPT

## 2022-01-24 PROCEDURE — 93000 ELECTROCARDIOGRAM COMPLETE: CPT

## 2022-01-24 RX ORDER — ANASTROZOLE TABLETS 1 MG/1
1 TABLET ORAL
Qty: 90 | Refills: 2 | Status: ACTIVE | COMMUNITY
Start: 2019-05-29 | End: 1900-01-01

## 2022-01-24 NOTE — PHYSICAL EXAM
[Restricted in physically strenuous activity but ambulatory and able to carry out work of a light or sedentary nature] : Status 1- Restricted in physically strenuous activity but ambulatory and able to carry out work of a light or sedentary nature, e.g., light house work, office work [Normal] : affect appropriate [de-identified] : Status post left breast lumpectomy and SLNB. The surgical scars are healing well.

## 2022-01-24 NOTE — CONSULT LETTER
[Dear  ___] : Dear  [unfilled], [Courtesy Letter:] : I had the pleasure of seeing your patient, [unfilled], in my office today. [Please see my note below.] : Please see my note below. [Sincerely,] : Sincerely, [FreeTextEntry3] : Herman Flores MD [DrBrittany  ___] : Dr. LOGAN

## 2022-01-24 NOTE — ASSESSMENT
[FreeTextEntry1] : 75 yo female has stage IA ([id number]) IDC of the left breast, G1, ER/NJ positive, Her-2 negative, s/p left breast lumpectomy and SLNB.\par RS 6.\par \par Recommendation:\par -- Continue Anastrozole daily. \par -- Osteopenia. Repeat bone density showed that her bone density has reduced comparing to previous study in 2019. T score at femoral neck is -1.9 and at hip -1.9.  Encourage more physical activities. Continue calcium and vitamin D supplement. \par -- Breast exam is unrevealing. She will have b/l dx mammo and left breast US in 2/2022. A referral was given.   \par -- She had provoked segmental PE in 4/2019 and completed Eliquis. Repeat CT showed resolution of subsegmental pulmonary emboli in the right lower lobe.\par -- Followup with PCP for other medical problems.\par -- Followup with Dr. Harley for back pain.\par -- RTO for followup in 6 months.\par \par \par \par \par

## 2022-01-24 NOTE — HISTORY OF PRESENT ILLNESS
[de-identified] : 73 yo female is referred by Dr. Stewart for consultation of systemic adjuvant therapy for newly diagnosed left sided breast cancer. The patient had a screening mammogram on 12/13/2018 where segmental calcifications were identified in the left breast at 12:00 position middle depth. She was subsequently recalled for a diagnostic mammogram, performed on 1/22/2019. Spot magnification views demonstrated a 6 cm area of segmental amorphous calcifications in the left breast at 12:00 position middle depth, for which a stereotactic biopsy was recommended. \par \par A stereotactic biopsy performed on 1/22/2019 demonstrated invasive moderately differentiated ductal carcinoma and sclerosing intraductal papilloma at the most anterior aspect of the biopsied calcifications and fibroadenomatoid change at the most posterior aspect of the biopsied calcifications. \par Estrogen receptor positive %\par Progesterone receptor positive %\par HER2 negative Negative 0\par Ki-67: 3%\par \par On 2/23/19, b/l breast MRI showed:\par RIGHT BREAST: \par Slightly irregular, linear nonmass enhancement in the central aspect of the right breast measures approximately 0.7 cm. MRI guided biopsy is recommended. No additional suspicious abnormal enhancement is seen in the right breast. No right axillary adenopathy. \par LEFT BREAST: \par 2 biopsy clips are seen in the superior aspect of the left breast. Mild patchy nonmass enhancement is seen surrounding the biopsy clips; linear nonmass enhancement is seen along the anterior and lateral aspect of the superior biopsy clip measuring approximately 1.8 cm. These correspond to the calcifications seen on the mammogram and to the biopsy-proven carcinoma. \par \par On 3/7/19, MRI guided core biopsy of right nonmass enhancement showed proliferative type fibrocystic changes. \par \par On 4/3/19, she underwent Left UOQ NLOC and Left SLNB. The pathology demonstrated 20 mm invasive well differentiated IDC and non extensive DCIS solid and cribriform types with comedo necrosis and calcifications low to intermediate nuclear grade; no LVI is seen; with negative margins. 0/2 (-) SLNB; AJCC 8th Edition Pathologic Stage: pT1c, p(sn)N0, pMx. \par \par The surgical specimen was sent for Oncotype dx analysis. her RS is 6, in the low risk range and predicting 3% risk of distant recurrence with endocrine therapy.\par \par The patient recovered well from surgery. She is here with her daughter to discuss systemic adjuvant therapy. \par \par Her PMH is significant for subsegmental PE found in 4/2019. She has been taking Eliquis. She has b/l leg venous insufficiency. \par \par She has no family history of breast cancer and ovarian cancer. \par  [de-identified] : 8/29/19\par Patient is here today for follow up visit accompanied by her daughter, Brie. She started Anastrozole 6/2019.  Initially she experienced fatigue but now tolerating it a little better. She takes Oxycodone prn for low back pain. Her daughter will take her to pain management, Dr. Harley. She wants to continue Anastrozole for now but may need to switch AI if pain worsens.\par She saw nash Morales, today for follow up of PE on Eliquis.  Repeat VQ scan and CXR ordered.  F/U appt 10/16/19.\par She had bone density on 6/13/19 which showed osteopenia in femoral neck.  She takes calcium and Vit D.\par \par 11/14/19:\par Patient is here today for follow up visit accompanied by her daughter. She has been taking Anastrozole daily since 6/2019. She has chronic joint aching and back pain. She takes Oxycodone prn for low back pain. In 9/2019, she had MRI lumbar spine which showed degenerative change, disc bulging and spinal stenosis. She sees Dr. Harley for pain management. She is taking Eliquis for h/o PE.\par She does not have breast related symptoms. She had b/l dx mammo in 11/2019. There is no suspicious finding. Bone density in 6/2019 showed mild osteopenia in femoral neck.  \par  \par 7/9/2020:\par Patient is here today for follow up visit. Her daughter is with her. She was diagnosed with stage IA ([id number]) IDC of the left breast, G1, ER/OK positive, Her-2 negative, s/p left breast lumpectomy and SLNB in 4/2019. She has been taking Anastrozole daily since 6/2019. She has chronic joint aching and back pain. She takes Oxycodone prn for low back pain. In 9/2019, she had MRI lumbar spine which showed degenerative change, disc bulging and spinal stenosis. She sees Dr. Harley for pain management. She had provoked segmental PE in 4/2019 and has been on Eliquis for more than one year.  \par She had left breast dx mammo and US today. There was no suspicious finding. She does not have breast related symptoms. Bone density in 6/2019 showed mild osteopenia in femoral neck.  \par  \par 1/21/2021:\par Patient is here today for follow up visit. Her daughter is with her. She was diagnosed with stage IA ([id number]) IDC of the left breast, G1, ER/OK positive, Her-2 negative, s/p left breast lumpectomy and SLNB in 4/2019. She has been taking Anastrozole daily since 6/2019. She has chronic joint aching and back pain. She takes Oxycodone prn for low back pain. In 9/2019, she had MRI lumbar spine which showed degenerative change, disc bulging and spinal stenosis. She sees Dr. Harley for pain management. \par She had provoked segmental PE in 4/2019 and was Eliquis for more than one year.  The repeat CT chest in 7/2020 showed No filling defects in the main pulmonary artery or segmental branches to suggest pulmonary embolus. Interval resolution of subsegmental pulmonary emboli in the right lower lobe. New groundglass opacity  in the left upper lobe. Differential includes infectious or inflammatory etiology. She saw Dr. Hickey and was given a course of ABx and had repeat CT chest which showed resolution of groundglass opacity.\par She had b/l breast dx mammo and left breast US on 1/12/2021 which showed Within the lumpectomy site at the 12:00 position, 7 cm from the nipple, there is an oval circumscribed pocket of complex fluid measuring 2.3 x 2.5 x 1.4 cm. This has markedly decreased since the prior exam with measured up to 6.1 cm\par \par 7/21/21: Patient is here today for follow up visit. Her daughter is with her. She was diagnosed with stage IA ([id number]) IDC of the left breast, G1, ER/OK positive, Her-2 negative, s/p left breast lumpectomy and SLNB in 4/2019. She has been taking Anastrozole daily since 6/2019.  She is tolerating it well, no side effects.  Patient denies cough, shortness of breath, fever, chills, night sweats or bone pain.\par \par 1/24/22:\par Patient is here today for follow up visit. Her daughter is with her. She has stage IA ([id number]) IDC of the left breast, G1, ER/OK positive, Her-2 negative, s/p left breast lumpectomy and SLNB in 4/2019. She has been taking Anastrozole daily since 6/2019.  She feels some body aching and arthralgia. She had left breast dx mammo in 7/2021. There was no suspicious finding. Repeat bone density was done today which shows osteopenia in hip and femoral neck. She has been taking calcium and vitamin D supplement.

## 2022-01-25 DIAGNOSIS — M89.9 DISORDER OF BONE, UNSPECIFIED: ICD-10-CM

## 2022-01-25 DIAGNOSIS — Z13.820 ENCOUNTER FOR SCREENING FOR OSTEOPOROSIS: ICD-10-CM

## 2022-01-25 DIAGNOSIS — Z78.0 ASYMPTOMATIC MENOPAUSAL STATE: ICD-10-CM

## 2022-01-25 DIAGNOSIS — M85.80 OTHER SPECIFIED DISORDERS OF BONE DENSITY AND STRUCTURE, UNSPECIFIED SITE: ICD-10-CM

## 2022-01-25 DIAGNOSIS — Z87.310 PERSONAL HISTORY OF (HEALED) OSTEOPOROSIS FRACTURE: ICD-10-CM

## 2022-01-26 DIAGNOSIS — Z78.0 ASYMPTOMATIC MENOPAUSAL STATE: ICD-10-CM

## 2022-01-26 DIAGNOSIS — Z87.310 PERSONAL HISTORY OF (HEALED) OSTEOPOROSIS FRACTURE: ICD-10-CM

## 2022-01-26 DIAGNOSIS — Z02.9 ENCOUNTER FOR ADMINISTRATIVE EXAMINATIONS, UNSPECIFIED: ICD-10-CM

## 2022-01-26 DIAGNOSIS — Z13.820 ENCOUNTER FOR SCREENING FOR OSTEOPOROSIS: ICD-10-CM

## 2022-01-26 DIAGNOSIS — M89.9 DISORDER OF BONE, UNSPECIFIED: ICD-10-CM

## 2022-02-14 ENCOUNTER — RESULT REVIEW (OUTPATIENT)
Age: 77
End: 2022-02-14

## 2022-02-14 ENCOUNTER — OUTPATIENT (OUTPATIENT)
Dept: OUTPATIENT SERVICES | Facility: HOSPITAL | Age: 77
LOS: 1 days | Discharge: HOME | End: 2022-02-14
Payer: MEDICARE

## 2022-02-14 DIAGNOSIS — Z98.890 OTHER SPECIFIED POSTPROCEDURAL STATES: Chronic | ICD-10-CM

## 2022-02-14 DIAGNOSIS — R92.8 OTHER ABNORMAL AND INCONCLUSIVE FINDINGS ON DIAGNOSTIC IMAGING OF BREAST: ICD-10-CM

## 2022-02-14 PROCEDURE — G0279: CPT | Mod: 26

## 2022-02-14 PROCEDURE — 76642 ULTRASOUND BREAST LIMITED: CPT | Mod: 26,LT

## 2022-02-14 PROCEDURE — 77066 DX MAMMO INCL CAD BI: CPT | Mod: 26

## 2022-03-02 NOTE — ASU DISCHARGE PLAN (ADULT/PEDIATRIC) - PATIENT BELONGINGS
Monitor: The patient's hypertension is improving with treatment.  Evaluation: Diagnostic tests ordered, see full progress note.  Assessment/Treatment:  Continue current treatment/monitoring regimen.  Continue diet and exercise program  Condition will be reassessed in 6 months      Patient's belongings returned

## 2022-03-31 NOTE — PHYSICAL EXAM
[No Acute Distress] : no acute distress [Well Nourished] : well nourished [Alert] : alert [Well Developed] : well developed [No Proptosis] : no proptosis [Normal Sclera/Conjunctiva] : normal sclera/conjunctiva [EOMI] : extra ocular movement intact [Normal Oropharynx] : the oropharynx was normal [Well Healed Scar] : well healed scar [No Respiratory Distress] : no respiratory distress [Clear to Auscultation] : lungs were clear to auscultation bilaterally [No Accessory Muscle Use] : no accessory muscle use [Normal Rate] : heart rate was normal [No Edema] : no peripheral edema [Regular Rhythm] : with a regular rhythm [Normal S1, S2] : normal S1 and S2 [Pedal Pulses Normal] : the pedal pulses are present [Normal Bowel Sounds] : normal bowel sounds [Not Tender] : non-tender [Not Distended] : not distended [No Spinal Tenderness] : no spinal tenderness [Normal Anterior Cervical Nodes] : no anterior cervical lymphadenopathy [Normal Posterior Cervical Nodes] : no posterior cervical lymphadenopathy [Soft] : abdomen soft [No Stigmata of Cushings Syndrome] : no stigmata of Cushings Syndrome [Spine Straight] : spine straight [Normal Gait] : normal gait [No Rash] : no rash [Normal Strength/Tone] : muscle strength and tone were normal [Normal Reflexes] : deep tendon reflexes were 2+ and symmetric [No Tremors] : no tremors [Oriented x3] : oriented to person, place, and time [Acanthosis Nigricans] : no acanthosis nigricans

## 2022-04-02 ENCOUNTER — APPOINTMENT (OUTPATIENT)
Dept: ENDOCRINOLOGY | Facility: CLINIC | Age: 77
End: 2022-04-02
Payer: MEDICARE

## 2022-04-02 PROCEDURE — 99443: CPT | Mod: 95

## 2022-04-02 NOTE — ASSESSMENT
[FreeTextEntry1] : The pt. has history of thyroid carcinoma with total thyroidectomy. Pt. has been followed with thyroglobulin levels which have been ordered each visit but lab has not performed. Pt with very low risk of recurrence but will again order thyroglobulin.  . In addition we are following U/S ( 2/28/2022 )   imaging to augment PE in detecting any evidence of recurrence.This was totally negative.   Goal has been to  gently suppress TSH with normal T4 and T3 and clinical euthyroid state. Risks of subclinical hyperthyroid reviewed in great detail including BMD and cardiac issues\par Currently TSH is  normal  as are free T4 and free T3..\par

## 2022-05-16 ENCOUNTER — NON-APPOINTMENT (OUTPATIENT)
Age: 77
End: 2022-05-16

## 2022-05-25 NOTE — PROGRESS NOTE ADULT - ATTENDING COMMENTS
I saw and examined the patient at bedside.   Still with back pain, encourage to ambulate with PT> Klisyri Pregnancy And Lactation Text: It is unknown if this medication can harm a developing fetus or if it is excreted in breast milk.

## 2022-08-08 ENCOUNTER — OUTPATIENT (OUTPATIENT)
Dept: OUTPATIENT SERVICES | Facility: HOSPITAL | Age: 77
LOS: 1 days | Discharge: HOME | End: 2022-08-08

## 2022-08-08 ENCOUNTER — RESULT REVIEW (OUTPATIENT)
Age: 77
End: 2022-08-08

## 2022-08-08 DIAGNOSIS — R92.8 OTHER ABNORMAL AND INCONCLUSIVE FINDINGS ON DIAGNOSTIC IMAGING OF BREAST: ICD-10-CM

## 2022-08-08 DIAGNOSIS — Z98.890 OTHER SPECIFIED POSTPROCEDURAL STATES: Chronic | ICD-10-CM

## 2022-08-08 PROCEDURE — G0279: CPT | Mod: 26

## 2022-08-08 PROCEDURE — 77065 DX MAMMO INCL CAD UNI: CPT | Mod: 26,LT

## 2022-09-04 ENCOUNTER — NON-APPOINTMENT (OUTPATIENT)
Age: 77
End: 2022-09-04

## 2022-09-13 ENCOUNTER — NON-APPOINTMENT (OUTPATIENT)
Age: 77
End: 2022-09-13

## 2022-09-16 ENCOUNTER — APPOINTMENT (OUTPATIENT)
Dept: VASCULAR SURGERY | Facility: CLINIC | Age: 77
End: 2022-09-16

## 2022-10-04 ENCOUNTER — APPOINTMENT (OUTPATIENT)
Dept: ENDOCRINOLOGY | Facility: CLINIC | Age: 77
End: 2022-10-04

## 2022-10-04 PROCEDURE — 99443: CPT | Mod: 95

## 2022-10-24 ENCOUNTER — APPOINTMENT (OUTPATIENT)
Dept: HEMATOLOGY ONCOLOGY | Facility: CLINIC | Age: 77
End: 2022-10-24

## 2022-11-04 ENCOUNTER — APPOINTMENT (OUTPATIENT)
Dept: CARDIOLOGY | Facility: CLINIC | Age: 77
End: 2022-11-04

## 2023-02-07 NOTE — CONSULT NOTE ADULT - CONSULT REQUESTED BY NAME
Dr. Prabhakar Holman Cyclophosphamide Counseling:  I discussed with the patient the risks of cyclophosphamide including but not limited to hair loss, hormonal abnormalities, decreased fertility, abdominal pain, diarrhea, nausea and vomiting, bone marrow suppression and infection. The patient understands that monitoring is required while taking this medication.

## 2023-03-07 NOTE — DISCHARGE NOTE PROVIDER - CARE PROVIDERS DIRECT ADDRESSES
30 day visit PT-PTA face-face discussion with ROSEANNE Guerrero re: patient status, POC, and plan for progression done 3/7/23.  Paula Jean, PTA     ,DirectAddress_Unknown ,DirectAddress_Unknown,DirectAddress_Unknown

## 2023-03-28 NOTE — ED ADULT TRIAGE NOTE - HEIGHT IN FEET
5 pt fell in washroom, r wrist fx per my review of outside xr  head inj r side  moving 4/4w deform / ttp r wrist  no chest wall ttp  Symptomatic treatment. xr wrist to prepare to reduce  labs / ekg  hx from pt and her daughter at bedside who will take pt home. was on floor due to not wanting to call daughter - advised to call for inj, eventual w cell phone life-alert.

## 2023-04-13 NOTE — ED PROVIDER NOTE - PHYSICAL EXAMINATION
Gen: NAD, AOx3  Head: NCAT  HEENT: PERRL, oral mucosa moist, normal conjunctiva, oropharynx clear without exudate or erythema  Lung: CTAB, no respiratory distress, no wheezing, rales, rhonchi  CV: normal s1/s2, rrr, Normal perfusion, pulses 2+ throughout  Abd: soft, NTND, no CVA tenderness  Genitourinary: no pelvic tenderness  MSK: No edema, no visible deformities, full range of motion in all 4 extremities  Neuro: CN II-XII grossly intact, No focal neurologic deficits, no nystagmus/pronator drift, strength 5/5 BUE/BLE  Skin: No rash   Psych: normal affect Private car

## 2023-04-15 ENCOUNTER — APPOINTMENT (OUTPATIENT)
Dept: ENDOCRINOLOGY | Facility: CLINIC | Age: 78
End: 2023-04-15
Payer: COMMERCIAL

## 2023-04-15 PROCEDURE — 99442: CPT
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
## 2023-10-13 PROBLEM — R73.09 ABNORMAL GLUCOSE: Status: ACTIVE | Noted: 2023-10-13

## 2023-10-13 PROBLEM — C73 PAPILLARY THYROID CARCINOMA: Status: ACTIVE | Noted: 2021-03-19

## 2023-10-13 PROBLEM — I10 HYPERTENSION: Status: ACTIVE | Noted: 2017-06-14

## 2023-10-13 PROBLEM — E78.5 HYPERLIPIDEMIA: Status: ACTIVE | Noted: 2017-06-14

## 2023-10-13 PROBLEM — R73.01 IMPAIRED FASTING GLUCOSE: Status: ACTIVE | Noted: 2022-04-02

## 2023-10-13 PROBLEM — E03.9 HYPOTHYROID: Status: ACTIVE | Noted: 2019-02-19

## 2023-10-13 PROBLEM — E89.0 POSTOPERATIVE HYPOTHYROIDISM: Status: ACTIVE | Noted: 2021-10-01

## 2023-10-14 ENCOUNTER — APPOINTMENT (OUTPATIENT)
Dept: ENDOCRINOLOGY | Facility: CLINIC | Age: 78
End: 2023-10-14
Payer: MEDICARE

## 2023-10-14 DIAGNOSIS — R73.09 OTHER ABNORMAL GLUCOSE: ICD-10-CM

## 2023-10-14 DIAGNOSIS — I10 ESSENTIAL (PRIMARY) HYPERTENSION: ICD-10-CM

## 2023-10-14 DIAGNOSIS — E03.9 HYPOTHYROIDISM, UNSPECIFIED: ICD-10-CM

## 2023-10-14 DIAGNOSIS — C73 MALIGNANT NEOPLASM OF THYROID GLAND: ICD-10-CM

## 2023-10-14 DIAGNOSIS — R73.01 IMPAIRED FASTING GLUCOSE: ICD-10-CM

## 2023-10-14 DIAGNOSIS — E89.0 POSTPROCEDURAL HYPOTHYROIDISM: ICD-10-CM

## 2023-10-14 DIAGNOSIS — E78.5 HYPERLIPIDEMIA, UNSPECIFIED: ICD-10-CM

## 2023-10-14 PROCEDURE — 99442: CPT | Mod: 95

## 2023-10-14 RX ORDER — CALCITRIOL 0.5 UG/1
0.5 CAPSULE, LIQUID FILLED ORAL
Qty: 13 | Refills: 3 | Status: ACTIVE | COMMUNITY
Start: 2017-08-07 | End: 1900-01-01

## 2023-10-14 RX ORDER — LEVOTHYROXINE SODIUM 0.17 MG/1
175 TABLET ORAL DAILY
Qty: 90 | Refills: 3 | Status: ACTIVE | COMMUNITY
Start: 1900-01-01 | End: 1900-01-01

## 2023-10-14 RX ORDER — PANTOPRAZOLE 40 MG/1
40 TABLET, DELAYED RELEASE ORAL
Qty: 90 | Refills: 3 | Status: ACTIVE | COMMUNITY
Start: 2023-10-14 | End: 1900-01-01

## 2024-08-03 ENCOUNTER — APPOINTMENT (OUTPATIENT)
Dept: ENDOCRINOLOGY | Facility: CLINIC | Age: 79
End: 2024-08-03
Payer: MEDICARE

## 2024-08-05 ENCOUNTER — APPOINTMENT (OUTPATIENT)
Dept: ENDOCRINOLOGY | Facility: CLINIC | Age: 79
End: 2024-08-05

## 2024-08-05 PROCEDURE — 99442: CPT | Mod: 93

## 2024-09-23 NOTE — DISCHARGE NOTE PROVIDER - NSDCQMAMI_CARD_ALL_CORE
Left message for patient. Nerve Block    Date/Time: 9/23/2024 6:10 PM    Performed by: Giuseppe Rudolph CRNA  Authorized by: Giuseppe Rudolph CRNA    Block Type: sciatic nerve popliteal approach  Laterality:  Left  Patient Location:  Pre-op  Indication: post-op pain management at surgeon's request    Preanesthetic Checklist Patient Identified (2 criteria), Block Plan Confirmed, Resuscitation Equipment Available, Supplemental O2 (if needed), Allergies Confirmed, Block Site Marked (if applicable), Monitors Applied, Aseptic Technique, Coagulation Status, Necessary Block Equipment Present, Timeout Performed, IV Access Functioning, Consent Verified, Drugs/Solutions Labeled and Sedation Given (if needed)    Patient Position:  Prone  Prep:  Chlorhexidine gluconate (CHG)  Max Sterile Barrier Technique:  Hand washing, cap/mask and sterile gloves  Monitoring:  Continuous pulse oximetry, blood pressure and EKG  Injection Technique:  Single-shot  Procedures: ultrasound guided and ultrasound permanent image saved    Needle Type:  Stimulating  Needle Gauge:  25 G  Needle Length:  10 cm  Needle Localization:  Anatomical landmarks, nerve stimulator and ultrasound guidance  Physical status during block:  Awake  Medications Administered  ropivacaine (NAROPIN) 0.5 % - Infiltration   20 mL - 9/23/2024 6:18:00 PM  dexAMETHasone (DECADRON) 4 mg/mL - Infiltration   4 mg - 9/23/2024 6:18:00 PM  Injection Assessment:  Negative aspiration for heme, no paresthesia on injection, no resistance to injection and incremental injection  Patient Condition:  Tolerated well, no immediate complications  Type of Motor Response: quadriceps    Paresthesia Pain:  None  Heart Rate Change: No    Slowly Injected: Yes    Start Time:9/23/2024 6:10 PM  Stop Time: 9/23/2024 6:18 PM       No

## 2024-12-25 NOTE — ASU PREOP CHECKLIST - IDENTIFICATION BAND VERIFIED
CHANGE how you take these medications      * ibuprofen 600 MG tablet  Commonly known as: ADVIL;MOTRIN  Take 1 tablet by mouth 3 times daily as needed for Pain  What changed: Another medication with the same name was added. Make sure you understand how and when to take each.     * ibuprofen 600 MG tablet  Commonly known as: ADVIL;MOTRIN  Take 1 tablet by mouth every 8 hours as needed for Pain  What changed: Another medication with the same name was added. Make sure you understand how and when to take each.     * ibuprofen 600 MG tablet  Commonly known as: ADVIL;MOTRIN  Take 1 tablet by mouth 3 times daily as needed for Pain  What changed: You were already taking a medication with the same name, and this prescription was added. Make sure you understand how and when to take each.           * This list has 3 medication(s) that are the same as other medications prescribed for you. Read the directions carefully, and ask your doctor or other care provider to review them with you.                ASK your doctor about these medications      azithromycin 500 MG tablet  Commonly known as: ZITHROMAX     neomycin-polymyxin-hydrocortisone [id number] otic solution  Commonly known as: CORTISPORIN  Instill 3 drops into right ear 4 times daily for 1 week     Paragard Intrauterine Copper IUD     Prenatal Plus 27-1 MG Tabs     tamoxifen 20 MG tablet  Commonly known as: NOLVADEX     valACYclovir 500 MG tablet  Commonly known as: VALTREX               Where to Get Your Medications        These medications were sent to [facility] [id number] [facility] [employer] - [place] - [facility] [address] - P [phone] - F [phone]  [facility] [address] [place] [address]      Phone: [phone]   acetaminophen 500 MG tablet  cyclobenzaprine 10 MG tablet  ibuprofen 600 MG tablet           DISCONTINUED MEDICATIONS:  Current Discharge Medication List          I have seen and evaluated the patient autonomously. My supervision physician was on site and available  done

## 2025-06-12 ENCOUNTER — APPOINTMENT (OUTPATIENT)
Dept: ENDOCRINOLOGY | Facility: CLINIC | Age: 80
End: 2025-06-12
Payer: COMMERCIAL

## 2025-06-12 PROCEDURE — 99203 OFFICE O/P NEW LOW 30 MIN: CPT | Mod: 3W

## 2025-06-19 NOTE — DISCHARGE NOTE PROVIDER - NSDCDCMDCOMP_GEN_ALL_CORE
Medication passed protocol.     Medication: albuterol  passed protocol.   Last office visit date: 10/24/24  Next appointment scheduled?: No; Outreach performed, left message for patient to call back.       This document is complete and the patient is ready for discharge.

## 2025-08-15 ENCOUNTER — RX RENEWAL (OUTPATIENT)
Age: 80
End: 2025-08-15

## 2025-09-18 ENCOUNTER — RX RENEWAL (OUTPATIENT)
Age: 80
End: 2025-09-18